# Patient Record
Sex: FEMALE | Race: BLACK OR AFRICAN AMERICAN | NOT HISPANIC OR LATINO | Employment: UNEMPLOYED | URBAN - METROPOLITAN AREA
[De-identification: names, ages, dates, MRNs, and addresses within clinical notes are randomized per-mention and may not be internally consistent; named-entity substitution may affect disease eponyms.]

---

## 2018-05-14 ENCOUNTER — OFFICE VISIT (OUTPATIENT)
Dept: PODIATRY | Facility: CLINIC | Age: 78
End: 2018-05-14
Payer: MEDICARE

## 2018-05-14 VITALS
HEIGHT: 64 IN | BODY MASS INDEX: 40.98 KG/M2 | WEIGHT: 240.06 LBS | DIASTOLIC BLOOD PRESSURE: 90 MMHG | SYSTOLIC BLOOD PRESSURE: 152 MMHG

## 2018-05-14 DIAGNOSIS — M20.42 HAMMER TOES OF BOTH FEET: ICD-10-CM

## 2018-05-14 DIAGNOSIS — N18.4 DIABETES MELLITUS DUE TO UNDERLYING CONDITION WITH STAGE 4 CHRONIC KIDNEY DISEASE, WITHOUT LONG-TERM CURRENT USE OF INSULIN: Primary | ICD-10-CM

## 2018-05-14 DIAGNOSIS — B35.1 NAIL DERMATOPHYTOSIS: ICD-10-CM

## 2018-05-14 DIAGNOSIS — M20.12 HALLUX ABDUCTO VALGUS, LEFT: ICD-10-CM

## 2018-05-14 DIAGNOSIS — M20.41 HAMMER TOES OF BOTH FEET: ICD-10-CM

## 2018-05-14 DIAGNOSIS — E08.22 DIABETES MELLITUS DUE TO UNDERLYING CONDITION WITH STAGE 4 CHRONIC KIDNEY DISEASE, WITHOUT LONG-TERM CURRENT USE OF INSULIN: Primary | ICD-10-CM

## 2018-05-14 DIAGNOSIS — M20.11 HALLUX ABDUCTO VALGUS, RIGHT: ICD-10-CM

## 2018-05-14 PROCEDURE — 3080F DIAST BP >= 90 MM HG: CPT | Mod: CPTII,S$GLB,, | Performed by: PODIATRIST

## 2018-05-14 PROCEDURE — 99203 OFFICE O/P NEW LOW 30 MIN: CPT | Mod: 25,S$GLB,, | Performed by: PODIATRIST

## 2018-05-14 PROCEDURE — 99999 PR PBB SHADOW E&M-NEW PATIENT-LVL III: CPT | Mod: PBBFAC,,, | Performed by: PODIATRIST

## 2018-05-14 PROCEDURE — 3077F SYST BP >= 140 MM HG: CPT | Mod: CPTII,S$GLB,, | Performed by: PODIATRIST

## 2018-05-14 PROCEDURE — 11721 DEBRIDE NAIL 6 OR MORE: CPT | Mod: Q9,S$GLB,, | Performed by: PODIATRIST

## 2018-05-14 RX ORDER — SERTRALINE HYDROCHLORIDE 50 MG/1
50 TABLET, FILM COATED ORAL DAILY
Refills: 0 | Status: ON HOLD | COMMUNITY
Start: 2018-05-09 | End: 2022-08-15

## 2018-05-14 RX ORDER — LINAGLIPTIN 5 MG/1
TABLET, FILM COATED ORAL
Refills: 4 | COMMUNITY
Start: 2018-04-03

## 2018-05-14 RX ORDER — FUROSEMIDE 40 MG/1
40 TABLET ORAL 2 TIMES DAILY
Refills: 3 | COMMUNITY
Start: 2018-04-22

## 2018-05-14 NOTE — LETTER
May 14, 2018      Marianne Padilla MD  [address] [place] [address]  [address]  [place] [address]           [facility]  [address] [place] [address]  Phone: [phone]          Patient: Nyasia Frye   MR Number: [id number]   YOB: 1940   Date of Visit: 5/14/2018       Dear Dr. Marianne Padilla:    Thank you for referring Nyasia Frye to me for evaluation. Attached you will find relevant portions of my assessment and plan of care.    If you have questions, please do not hesitate to call me. I look forward to following Nyasia Frye along with you.    Sincerely,    Amena Judge DPM    Enclosure  CC:  No Recipients    If you would like to receive this communication electronically, please contact [email or web address] or [phone] to request more information on [employer] Link access.    For providers and/or their staff who would like to refer a patient to [facility], please contact us through our one-stop-shop provider referral line, [facility], at [phone].    If you feel you have received this communication in error or would no longer like to receive these types of communications, please e-mail [email or web address]

## 2018-05-14 NOTE — PATIENT INSTRUCTIONS
Recommend lotions: eucerin, eucerin for diabetics, aquaphor, A&D ointment, gold bond for diabetics, sween, [place]'s Bees all purpose baby ointment,  urea 40 with aloe (found on amazon.com)    Shoe recommendations: (try 6pm.com, zappos.com , nordstromrack.eIQnetworks, or shoes.eIQnetworks for discounted prices) you can visit DSW shoes in [place]  or [employer] [facility] in the [facility] (there are also several shoe brand outlets in the [facility])    Asics (GT 2000 or gel foundations), new balance stability type shoes, saucony (stabil c3),  William (GTS or Beast or transcend), vionic, propet (tennis shoe)    sofft brand, clarks, crocs, aerosoles, naturalizers, SAS, ecco, born, andrew nation, rockports (dress shoes)    Vionic, burkenstocks, fitflops, propet (sandals)  Nike comfort thong sandals, crocs, propet (house shoes)    Nail Home remedy:  Vicks Vapor rub to nails for easier managability    Occasional soaks for 15-20 mins in luke warm water with 1 cup of listerine and 1 cup of apple cider vinegar are ok You may add several drops of oil of oregano or tea tree oil as well        Diabetes: Inspecting Your Feet  Diabetes increases your chances of developing foot problems. So inspect your feet every day. This helps you find small skin irritations before they become serious infections. If you have trouble seeing the bottoms of your feet, use a mirror or ask a family member or friend to help.     Pressure spots on the bottom of the foot are common areas where problems develop.   How to check your feet  Below are tips to help you look for foot problems. Try to check your feet at the same time each day, such as when you get out of bed in the morning:  · Check the top of each foot. The tops of toes, back of the heel, and outer edge of the foot can get a lot of rubbing from poor-fitting shoes.  · Check the bottom of each foot. Daily wear and tear often leads to problems at pressure spots.  · Check the toes and nails. Fungal infections often occur  between toes. Toenail problems can also be a sign of fungal infections or lead to breaks in the skin.  · Check your shoes, too. Loose objects inside a shoe can injure the foot. Use your hand to feel inside your shoes for things like shakeel, loose stitching, or rough areas that could irritate your skin.  Warning signs  Look for any color changes in the foot. Redness with streaks can signal a severe infection, which needs immediate medical attention. Tell your doctor right away if you have any of these problems:  · Swelling, sometimes with color changes, may be a sign of poor blood flow or infection. Symptoms include tenderness and an increase in the size of your foot.  · Warm or hot areas on your feet may be signs of infection. A foot that is cold may not be getting enough blood.  · Sensations such as burning, tingling, or pins and needles can be signs of a problem. Also check for areas that may be numb.  · Hot spots are caused by friction or pressure. Look for hot spots in areas that get a lot of rubbing. Hot spots can turn into blisters, calluses, or sores.  · Cracks and sores are caused by dry or irritated skin. They are a sign that the skin is breaking down, which can lead to infection.  · Toenail problems to watch for include nails growing into the skin (ingrown toenail) and causing redness or pain. Thick, yellow, or discolored nails can signal a fungal infection.  · Drainage and odor can develop from untreated sores and ulcers. Call your doctor right away if you notice white or yellow drainage, bleeding, or unpleasant odor.   © 1333-9680 [employer]. [address] [place] [address]. All rights reserved. This information is not intended as a substitute for professional medical care. Always follow your healthcare professional's instructions.        Step-by-Step:  Inspecting Your Feet (Diabetes)    Date Last Reviewed: 10/1/2016  © 6767-0102 [employer]. [address] [place] [address]  Road, [place] [address]. All rights reserved. This information is not intended as a substitute for professional medical care. Always follow your healthcare professional's instructions.

## 2018-05-14 NOTE — PROGRESS NOTES
Subjective:      Patient ID: Nyasia Frye is a 78 y.o. female.    Chief Complaint: Diabetes Mellitus (PCP Dr. Padilla 3/19/18); Diabetic Foot Exam; and Nail Care    Nyasia is a 78 y.o. female who presents to the clinic for evaluation and treatment of high risk feet. Nyasia has a past medical history of *Atrial fibrillation; Allergy; Atrial fibrillation; Controlled gout; Diabetes mellitus type II; Hypercholesteremia; Hyperlipidemia; Hypertension; and Osteoporosis, senile. The patient's chief complaint is long, thick toenails. This patient has documented high risk feet requiring routine maintenance secondary to diabetes mellitis and those secondary complications of diabetes, as mentioned..    PCP: Marianne Padilla MD    Date Last Seen by PCP:  05/27/2018 (upcoming)  Chief Complaint   Patient presents with    Diabetes Mellitus     PCP Dr. Padilla 3/19/18    Diabetic Foot Exam    Nail Care       Current shoe gear:  Slip-on shoes    No results found for: HGBA1C        Patient Active Problem List   Diagnosis    Senile osteoporosis    Diabetes mellitus, type 2    Hypertension    Atrial fibrillation    Hyperlipidemia    Rash    Cellulitis       Current Outpatient Prescriptions on File Prior to Visit   Medication Sig Dispense Refill    amiodarone (PACERONE) 200 MG Tab Take by mouth once daily.      febuxostat (ULORIC) 40 mg Tab Take 20 mg by mouth once daily.      fish oil-omega-3 fatty acids 300-1,000 mg capsule Take 2 g by mouth once daily.        losartan (COZAAR) 100 MG tablet Take 100 mg by mouth once daily.      pravastatin (PRAVACHOL) 40 MG tablet Take 40 mg by mouth once daily.        rivaroxaban (XARELTO) 15 mg Tab Take 10 mg by mouth once daily.      denosumab (PROLIA) 60 mg/mL Syrg Inject 1 mL (60 mg total) into the skin every 6 (six) months.      furosemide (LASIX) 20 MG tablet Take 20 mg by mouth 2 (two) times daily.        predniSONE (DELTASONE) 20 MG tablet Take 1 tablet (20 mg total) by mouth once  daily. 9 tablet 0    ranitidine (ZANTAC) 150 MG capsule Take 150 mg by mouth 2 (two) times daily.         Current Facility-Administered Medications on File Prior to Visit   Medication Dose Route Frequency Provider Last Rate Last Dose    denosumab (PROLIA) injection 60 mg  60 mg Subcutaneous Q6 Months Patricia Dueñas MD   60 mg at 03/18/16 1307       Review of patient's allergies indicates:   Allergen Reactions    Clindamycin      rash    Metformin     Penicillins     Zyloprim [allopurinol]        Past Surgical History:   Procedure Laterality Date    CARDIAC DEFIBRILLATOR PLACEMENT      OOPHORECTOMY      TUBAL LIGATION         Family History   Problem Relation Age of Onset    Breast cancer Sister     Colon cancer Sister     Ovarian cancer Neg Hx        Social History     Social History    Marital status:      Spouse name: N/A    Number of children: N/A    Years of education: N/A     Occupational History    Not on file.     Social History Main Topics    Smoking status: Former Smoker    Smokeless tobacco: Never Used    Alcohol use No    Drug use: No    Sexual activity: No     Other Topics Concern    Not on file     Social History Narrative    No narrative on file       Review of Systems   Constitution: Negative for chills, fever and weakness.   Cardiovascular: Positive for leg swelling. Negative for claudication.   Respiratory: Negative for cough and shortness of breath.    Skin: Positive for dry skin and nail changes. Negative for itching and rash.   Musculoskeletal: Positive for joint pain and myalgias. Negative for falls, joint swelling and muscle weakness.   Gastrointestinal: Negative for diarrhea, nausea and vomiting.   Neurological: Positive for numbness and paresthesias. Negative for tremors.   Psychiatric/Behavioral: Negative for altered mental status and hallucinations.           Objective:      Vitals:    05/14/18 1412   BP: (!) 152/90   Weight: 108.9 kg (240 lb 1.3  "oz)   Height: 5' 4" (1.626 m)   PainSc: 0-No pain       Physical Exam   Constitutional: She appears well-developed and well-nourished.  Non-toxic appearance. She does not have a sickly appearance. No distress.   alert and oriented x 3.    Cardiovascular:   Pulses:       Dorsalis pedis pulses are 1+ on the right side, and 1+ on the left side.        Posterior tibial pulses are 1+ on the right side, and 1+ on the left side.   Dorsalis pedis and posterior tibial pulses are diminished Bilaterally. Toes are cool to touch. Feet are warm proximally.There is decreased digital hair. Skin is atrophic, slightly hyperpigmented, and mildly edematous     Pulmonary/Chest: No respiratory distress.   Musculoskeletal: She exhibits no deformity.        Right ankle: She exhibits swelling. No tenderness. No lateral malleolus, no medial malleolus, no AITFL, no CF ligament and no posterior TFL tenderness found. Achilles tendon exhibits no pain, no defect and normal Collins's test results.        Left ankle: She exhibits swelling. No tenderness. No lateral malleolus, no medial malleolus, no AITFL, no CF ligament and no posterior TFL tenderness found. Achilles tendon exhibits no pain, no defect and normal Collins's test results.        Right foot: There is no tenderness and no bony tenderness.        Left foot: There is no tenderness and no bony tenderness.   Decreased stride, station of gait.  apropulsive toe off.  Increased angle and base of gait.    Patient has hammertoes of digits 2-5 bilateral partially reducible without symptom today.    Visible and palpable bunion without pain at dorsomedial 1st metatarsal head right and left.  Hallux abducted right and left partially reducible, tracks laterally without being track bound.  No ecchymosis, erythema, edema, or cardinal signs infection or signs of trauma same foot.    Muscle strength is 5/5 in all groups bilaterally.           Feet:   Right Foot:   Protective Sensation: 5 sites tested. " 5 sites sensed.   Left Foot:   Protective Sensation: 5 sites tested. 5 sites sensed.   Lymphadenopathy:   No lymphatic streaking     Neurological: She displays no atrophy. No sensory deficit.   Light touch present    Paresthesias, and hyperesthesia bilateral feet at toes with no clearly identified trigger or source.       Skin: Skin is warm, dry and intact. No abrasion, no bruising, no lesion and no rash noted. She is not diaphoretic. No cyanosis or erythema. No pallor. Nails show no clubbing.   Toenails 1-5 bilaterally are elongated by 2-3 mm, thickened by 2-3 mm, discolored/yellowed, dystrophic, brittle with subungual debris.    Psychiatric: Her mood appears not anxious. Her affect is not inappropriate. Her speech is not slurred. She is not combative. She is communicative. She is attentive.   Nursing note and vitals reviewed.          Assessment:       Encounter Diagnoses   Name Primary?    Diabetes mellitus due to underlying condition with stage 4 chronic kidney disease, without long-term current use of insulin Yes    Nail dermatophytosis     Hammer toes of both feet     Hallux abducto valgus, left     Hallux abducto valgus, right          Plan:       Nyasia was seen today for diabetes mellitus, diabetic foot exam and nail care.    Diagnoses and all orders for this visit:    Diabetes mellitus due to underlying condition with stage 4 chronic kidney disease, without long-term current use of insulin  -     DIABETIC SHOES FOR HOME USE    Nail dermatophytosis    Hammer toes of both feet  -     DIABETIC SHOES FOR HOME USE    Hallux abducto valgus, left  -     DIABETIC SHOES FOR HOME USE    Hallux abducto valgus, right  -     DIABETIC SHOES FOR HOME USE      I counseled the patient on her conditions, their implications and medical management.      Greater than 50% of this visit spent on counseling and coordination of care.    Greater than 20 minutes spent on education about the diabetic foot, neuropathy, and  prevention of limb loss.    Shoe inspection. Diabetic Foot Education. Patient reminded of the importance of good nutrition/healthy diet/weight management and blood sugar control to help prevent podiatric complications of diabetes. Patient instructed on proper foot hygeine. Wear comfortable, proper fitting shoes. Wash feet daily. Dry well. After drying, apply moisturizer to feet (no lotion to webspaces). Inspect feet daily for skin breaks, blisters, swelling, or redness. Wear cotton socks (preferably white)  Change socks every day. Do NOT walk barefoot. Do NOT use heating pads or hot water soaks. We discussed wearing proper shoe gear, daily foot inspections, never walking without protective shoe gear.     Discussed edema control and the continued use of compression stockings.     Discussed importance of daily moisturizer to the feet such as Gold bonds diabetic foot cream    Rx diabetic shoes for protection and support    With patient's permission, nails were aggressively reduced and debrided x 10 to their soft tissue attachment mechanically and with electric , removing all offending nail and debris. Patient relates relief following the procedure.     She will continue to monitor the areas daily, inspect her feet, wear protective shoe gear when ambulatory, moisturizer to maintain skin integrity and follow in this office in approximately 2-5 months, sooner p.r.n.

## 2018-05-15 NOTE — PROGRESS NOTES
Patient, Nyasia Frye (MRN #[id number]), presented with a recorded BMI of 41.21 kg/m^2 consistent with the definition of morbid obesity (ICD-10 E66.01). The patient's morbid obesity was monitored, evaluated, addressed and/or treated. This addendum to the medical record is made on 05/14/2018.

## 2018-08-21 ENCOUNTER — OFFICE VISIT (OUTPATIENT)
Dept: PODIATRY | Facility: CLINIC | Age: 78
End: 2018-08-21
Payer: MEDICARE

## 2018-08-21 VITALS
HEIGHT: 64 IN | BODY MASS INDEX: 40.97 KG/M2 | DIASTOLIC BLOOD PRESSURE: 78 MMHG | WEIGHT: 240 LBS | SYSTOLIC BLOOD PRESSURE: 120 MMHG

## 2018-08-21 DIAGNOSIS — M20.41 HAMMER TOES OF BOTH FEET: ICD-10-CM

## 2018-08-21 DIAGNOSIS — N18.4 DIABETES MELLITUS DUE TO UNDERLYING CONDITION WITH STAGE 4 CHRONIC KIDNEY DISEASE, WITHOUT LONG-TERM CURRENT USE OF INSULIN: Primary | ICD-10-CM

## 2018-08-21 DIAGNOSIS — M20.11 HALLUX ABDUCTO VALGUS, RIGHT: ICD-10-CM

## 2018-08-21 DIAGNOSIS — M20.42 HAMMER TOES OF BOTH FEET: ICD-10-CM

## 2018-08-21 DIAGNOSIS — L84 CORN OR CALLUS: ICD-10-CM

## 2018-08-21 DIAGNOSIS — E08.22 DIABETES MELLITUS DUE TO UNDERLYING CONDITION WITH STAGE 4 CHRONIC KIDNEY DISEASE, WITHOUT LONG-TERM CURRENT USE OF INSULIN: Primary | ICD-10-CM

## 2018-08-21 DIAGNOSIS — M20.12 HALLUX ABDUCTO VALGUS, LEFT: ICD-10-CM

## 2018-08-21 DIAGNOSIS — B35.1 NAIL DERMATOPHYTOSIS: ICD-10-CM

## 2018-08-21 PROCEDURE — 11721 DEBRIDE NAIL 6 OR MORE: CPT | Mod: 59,Q9,S$GLB, | Performed by: PODIATRIST

## 2018-08-21 PROCEDURE — 99499 UNLISTED E&M SERVICE: CPT | Mod: S$GLB,,, | Performed by: PODIATRIST

## 2018-08-21 PROCEDURE — 11056 PARNG/CUTG B9 HYPRKR LES 2-4: CPT | Mod: Q9,S$GLB,, | Performed by: PODIATRIST

## 2018-08-21 PROCEDURE — 99999 PR PBB SHADOW E&M-EST. PATIENT-LVL III: CPT | Mod: PBBFAC,,, | Performed by: PODIATRIST

## 2018-08-21 NOTE — PATIENT INSTRUCTIONS
Recommend lotions: eucerin, eucerin for diabetics, aquaphor, A&D ointment, gold bond for diabetics, sween, [place]'s Bees all purpose baby ointment,  urea 40 with aloe (found on amazon.com)    Shoe recommendations: (try 6pm.com, zappos.com , nordstromrack.Gridco, or shoes.[employer]co for discounted prices) you can visit DSW shoes in [place]  or [facility] in the [facility] (there are also several shoe brand outlets in the [facility])    Asics (GT 2000 or gel foundations), new balance stability type shoes, saucony (stabil c3),  William (GTS or Beast or transcend), vionic, propet (tennis shoe)    sofft brand, clarks, crocs, aerosoles, naturalizers, SAS, ecco, born, andrew nation, rockports (dress shoes)    Vionic, burkenstocks, fitflops, propet (sandals)  Nike comfort thong sandals, crocs, propet (house shoes)    Nail Home remedy:  Vicks Vapor rub to nails for easier managability    Occasional soaks for 15-20 mins in luke warm water with 1 cup of listerine and 1 cup of apple cider vinegar are ok You may add several drops of oil of oregano or tea tree oil as well        Diabetes: Inspecting Your Feet  Diabetes increases your chances of developing foot problems. So inspect your feet every day. This helps you find small skin irritations before they become serious infections. If you have trouble seeing the bottoms of your feet, use a mirror or ask a family member or friend to help.     Pressure spots on the bottom of the foot are common areas where problems develop.   How to check your feet  Below are tips to help you look for foot problems. Try to check your feet at the same time each day, such as when you get out of bed in the morning:  · Check the top of each foot. The tops of toes, back of the heel, and outer edge of the foot can get a lot of rubbing from poor-fitting shoes.  · Check the bottom of each foot. Daily wear and tear often leads to problems at pressure spots.  · Check the toes and nails. Fungal infections often occur  between toes. Toenail problems can also be a sign of fungal infections or lead to breaks in the skin.  · Check your shoes, too. Loose objects inside a shoe can injure the foot. Use your hand to feel inside your shoes for things like shakeel, loose stitching, or rough areas that could irritate your skin.  Warning signs  Look for any color changes in the foot. Redness with streaks can signal a severe infection, which needs immediate medical attention. Tell your doctor right away if you have any of these problems:  · Swelling, sometimes with color changes, may be a sign of poor blood flow or infection. Symptoms include tenderness and an increase in the size of your foot.  · Warm or hot areas on your feet may be signs of infection. A foot that is cold may not be getting enough blood.  · Sensations such as burning, tingling, or pins and needles can be signs of a problem. Also check for areas that may be numb.  · Hot spots are caused by friction or pressure. Look for hot spots in areas that get a lot of rubbing. Hot spots can turn into blisters, calluses, or sores.  · Cracks and sores are caused by dry or irritated skin. They are a sign that the skin is breaking down, which can lead to infection.  · Toenail problems to watch for include nails growing into the skin (ingrown toenail) and causing redness or pain. Thick, yellow, or discolored nails can signal a fungal infection.  · Drainage and odor can develop from untreated sores and ulcers. Call your doctor right away if you notice white or yellow drainage, bleeding, or unpleasant odor.   © 3149-5287 [employer]. [address] [place] [address]. All rights reserved. This information is not intended as a substitute for professional medical care. Always follow your healthcare professional's instructions.        Step-by-Step:  Inspecting Your Feet (Diabetes)    Date Last Reviewed: 10/1/2016  © 4542-7357 [employer]. [address] [place] [address]  Road, [place] [address]. All rights reserved. This information is not intended as a substitute for professional medical care. Always follow your healthcare professional's instructions.

## 2018-08-21 NOTE — PROGRESS NOTES
Subjective:      Patient ID: Nyasia Frye is a 78 y.o. female.    Chief Complaint: Diabetes Mellitus (pcp DENISE Padilla); Diabetic Foot Exam; and Nail Care    Nyasia is a 78 y.o. female who presents to the clinic for evaluation and treatment of high risk feet. Nyasia has a past medical history of *Atrial fibrillation, Allergy, Atrial fibrillation, Controlled gout, Diabetes mellitus type II, Hypercholesteremia, Hyperlipidemia, Hypertension, and Osteoporosis, senile. The patient's chief complaint is long, thick toenails. This patient has documented high risk feet requiring routine maintenance secondary to diabetes mellitis and those secondary complications of diabetes, as mentioned..    PCP: Marianne Padilla MD    Date Last Seen by PCP:  08/27/2018 (upcoming)  Chief Complaint   Patient presents with    Diabetes Mellitus     pcp DENISE Padilla    Diabetic Foot Exam    Nail Care       Current shoe gear:  Slip-on shoes    No results found for: HGBA1C        Patient Active Problem List   Diagnosis    Senile osteoporosis    Diabetes mellitus, type 2    Hypertension    Atrial fibrillation    Hyperlipidemia    Rash    Cellulitis       Current Outpatient Medications on File Prior to Visit   Medication Sig Dispense Refill    amiodarone (PACERONE) 200 MG Tab Take by mouth once daily.      febuxostat (ULORIC) 40 mg Tab Take 20 mg by mouth once daily.      fish oil-omega-3 fatty acids 300-1,000 mg capsule Take 2 g by mouth once daily.        furosemide (LASIX) 20 MG tablet Take 20 mg by mouth 2 (two) times daily.        furosemide (LASIX) 40 MG tablet Take 40 mg by mouth 2 (two) times daily.  3    losartan (COZAAR) 100 MG tablet Take 100 mg by mouth once daily.      pravastatin (PRAVACHOL) 40 MG tablet Take 40 mg by mouth once daily.        ranitidine (ZANTAC) 150 MG capsule Take 150 mg by mouth 2 (two) times daily.        ranitidine (ZANTAC) 150 MG tablet Take 150 mg by mouth 2 (two) times daily.  0    rivaroxaban (XARELTO)  15 mg Tab Take 10 mg by mouth once daily.      sertraline (ZOLOFT) 50 MG tablet Take 50 mg by mouth once daily.  0    TRADJENTA 5 mg Tab tablet TAKE ONE(1) TABLET BY MOUTH EVERY DAY  4    denosumab (PROLIA) 60 mg/mL Syrg Inject 1 mL (60 mg total) into the skin every 6 (six) months.      predniSONE (DELTASONE) 20 MG tablet Take 1 tablet (20 mg total) by mouth once daily. 9 tablet 0     Current Facility-Administered Medications on File Prior to Visit   Medication Dose Route Frequency Provider Last Rate Last Dose    denosumab (PROLIA) injection 60 mg  60 mg Subcutaneous Q6 Months Patricia Dueñas MD   60 mg at 03/18/16 1307       Review of patient's allergies indicates:   Allergen Reactions    Clindamycin      rash    Metformin     Penicillins     Zyloprim [allopurinol]        Past Surgical History:   Procedure Laterality Date    CARDIAC DEFIBRILLATOR PLACEMENT      OOPHORECTOMY      TUBAL LIGATION         Family History   Problem Relation Age of Onset    Breast cancer Sister     Colon cancer Sister     Ovarian cancer Neg Hx        Social History     Socioeconomic History    Marital status:      Spouse name: Not on file    Number of children: Not on file    Years of education: Not on file    Highest education level: Not on file   Social Needs    Financial resource strain: Not on file    Food insecurity - worry: Not on file    Food insecurity - inability: Not on file    Transportation needs - medical: Not on file    Transportation needs - non-medical: Not on file   Occupational History    Not on file   Tobacco Use    Smoking status: Former Smoker    Smokeless tobacco: Never Used   Substance and Sexual Activity    Alcohol use: No    Drug use: No    Sexual activity: No   Other Topics Concern    Not on file   Social History Narrative    Not on file       Review of Systems   Constitution: Negative for chills, fever and weakness.   Cardiovascular: Positive for leg swelling.  "Negative for claudication.   Respiratory: Negative for cough and shortness of breath.    Skin: Positive for dry skin and nail changes. Negative for itching and rash.   Musculoskeletal: Positive for joint pain and myalgias. Negative for falls, joint swelling and muscle weakness.   Gastrointestinal: Negative for diarrhea, nausea and vomiting.   Neurological: Positive for numbness and paresthesias. Negative for tremors.   Psychiatric/Behavioral: Negative for altered mental status and hallucinations.           Objective:      Vitals:    08/21/18 1442   BP: 120/78   Weight: 108.9 kg (240 lb)   Height: 5' 4" (1.626 m)   PainSc: 0-No pain       Physical Exam   Constitutional: She appears well-developed and well-nourished.  Non-toxic appearance. She does not have a sickly appearance. No distress.   alert and oriented x 3.    Cardiovascular:   Pulses:       Dorsalis pedis pulses are 1+ on the right side, and 1+ on the left side.        Posterior tibial pulses are 1+ on the right side, and 1+ on the left side.   Dorsalis pedis and posterior tibial pulses are diminished Bilaterally. Toes are cool to touch. Feet are warm proximally.There is decreased digital hair. Skin is atrophic, slightly hyperpigmented, and mildly edematous     Pulmonary/Chest: No respiratory distress.   Musculoskeletal: She exhibits no deformity.        Right ankle: She exhibits swelling. No tenderness. No lateral malleolus, no medial malleolus, no AITFL, no CF ligament and no posterior TFL tenderness found. Achilles tendon exhibits no pain, no defect and normal Collins's test results.        Left ankle: She exhibits swelling. No tenderness. No lateral malleolus, no medial malleolus, no AITFL, no CF ligament and no posterior TFL tenderness found. Achilles tendon exhibits no pain, no defect and normal Collins's test results.        Right foot: There is no tenderness and no bony tenderness.        Left foot: There is no tenderness and no bony tenderness. "   Decreased stride, station of gait.  apropulsive toe off.  Increased angle and base of gait.    Patient has hammertoes of digits 2-5 bilateral partially reducible without symptom today.    Visible and palpable bunion without pain at dorsomedial 1st metatarsal head right and left.  Hallux abducted right and left partially reducible, tracks laterally without being track bound.  No ecchymosis, erythema, edema, or cardinal signs infection or signs of trauma same foot.    Muscle strength is 5/5 in all groups bilaterally.           Feet:   Right Foot:   Protective Sensation: 5 sites tested. 5 sites sensed.   Left Foot:   Protective Sensation: 5 sites tested. 5 sites sensed.   Lymphadenopathy:   No lymphatic streaking     Neurological: She displays no atrophy. No sensory deficit.   Light touch present    Paresthesias, and hyperesthesia bilateral feet at toes with no clearly identified trigger or source.       Skin: Skin is warm, dry and intact. No abrasion, no bruising and no rash noted. She is not diaphoretic. No cyanosis or erythema. No pallor. Nails show no clubbing.   Toenails 1-5 bilaterally are elongated by 2-3 mm, thickened by 2-3 mm, discolored/yellowed, dystrophic, brittle with subungual debris.     Focal hyperkeratotic lesion consisting entirely of hyperkeratotic tissue without open skin, drainage, pus, fluctuance, malodor, or signs of infection medial hallux IPJ keegan and plantar heel midline keegan   Psychiatric: Her mood appears not anxious. Her affect is not inappropriate. Her speech is not slurred. She is not combative. She is communicative. She is attentive.   Nursing note and vitals reviewed.          Assessment:       Encounter Diagnoses   Name Primary?    Diabetes mellitus due to underlying condition with stage 4 chronic kidney disease, without long-term current use of insulin Yes    Nail dermatophytosis     Hammer toes of both feet     Hallux abducto valgus, left     Hallux abducto valgus, right      Corn or callus          Plan:       Nyasia was seen today for diabetes mellitus, diabetic foot exam and nail care.    Diagnoses and all orders for this visit:    Diabetes mellitus due to underlying condition with stage 4 chronic kidney disease, without long-term current use of insulin    Nail dermatophytosis    Hammer toes of both feet    Hallux abducto valgus, left    Hallux abducto valgus, right    Corn or callus      I counseled the patient on her conditions, their implications and medical management.      Greater than 50% of this visit spent on counseling and coordination of care.    Education about the diabetic foot, neuropathy, and prevention of limb loss.    Shoe inspection. Diabetic Foot Education. Patient reminded of the importance of good nutrition/healthy diet/weight management and blood sugar control to help prevent podiatric complications of diabetes. Patient instructed on proper foot hygeine. Wear comfortable, proper fitting shoes. Wash feet daily. Dry well. After drying, apply moisturizer to feet (no lotion to webspaces). Inspect feet daily for skin breaks, blisters, swelling, or redness. Wear cotton socks (preferably white)  Change socks every day. Do NOT walk barefoot. Do NOT use heating pads or hot water soaks. We discussed wearing proper shoe gear, daily foot inspections, never walking without protective shoe gear.     Discussed edema control and the continued use of compression stockings.     Discussed importance of daily moisturizer to the feet such as Gold bonds diabetic foot cream    After cleansing the  area w/ alcohol prep pad the above mentioned hyperkeratosis was trimmed utilizing No 15 scapel, to a smooth base with out incident. Patient tolerated this  well and reported comfort to the area of medial hallux IPJ keegan and plantar heel midline keegan    With patient's permission, nails were aggressively reduced and debrided x 10 to their soft tissue attachment mechanically and with electric  , removing all offending nail and debris. Patient relates relief following the procedure.     She will continue to monitor the areas daily, inspect her feet, wear protective shoe gear when ambulatory, moisturizer to maintain skin integrity and follow in this office in approximately 2-5 months, sooner p.r.n.

## 2018-11-27 ENCOUNTER — OFFICE VISIT (OUTPATIENT)
Dept: PODIATRY | Facility: CLINIC | Age: 78
End: 2018-11-27
Payer: MEDICARE

## 2018-11-27 VITALS
BODY MASS INDEX: 40.97 KG/M2 | WEIGHT: 240 LBS | DIASTOLIC BLOOD PRESSURE: 84 MMHG | SYSTOLIC BLOOD PRESSURE: 130 MMHG | HEIGHT: 64 IN

## 2018-11-27 DIAGNOSIS — E08.22 DIABETES MELLITUS DUE TO UNDERLYING CONDITION WITH STAGE 4 CHRONIC KIDNEY DISEASE, WITHOUT LONG-TERM CURRENT USE OF INSULIN: Primary | ICD-10-CM

## 2018-11-27 DIAGNOSIS — M20.11 HALLUX ABDUCTO VALGUS, RIGHT: ICD-10-CM

## 2018-11-27 DIAGNOSIS — M20.12 HALLUX ABDUCTO VALGUS, LEFT: ICD-10-CM

## 2018-11-27 DIAGNOSIS — M20.41 HAMMER TOES OF BOTH FEET: ICD-10-CM

## 2018-11-27 DIAGNOSIS — N18.4 DIABETES MELLITUS DUE TO UNDERLYING CONDITION WITH STAGE 4 CHRONIC KIDNEY DISEASE, WITHOUT LONG-TERM CURRENT USE OF INSULIN: Primary | ICD-10-CM

## 2018-11-27 DIAGNOSIS — L84 CORN OR CALLUS: ICD-10-CM

## 2018-11-27 DIAGNOSIS — M20.42 HAMMER TOES OF BOTH FEET: ICD-10-CM

## 2018-11-27 DIAGNOSIS — B35.1 NAIL DERMATOPHYTOSIS: ICD-10-CM

## 2018-11-27 PROCEDURE — 11056 PARNG/CUTG B9 HYPRKR LES 2-4: CPT | Mod: Q9,S$GLB,, | Performed by: PODIATRIST

## 2018-11-27 PROCEDURE — 99999 PR PBB SHADOW E&M-EST. PATIENT-LVL III: CPT | Mod: PBBFAC,,, | Performed by: PODIATRIST

## 2018-11-27 PROCEDURE — 11721 DEBRIDE NAIL 6 OR MORE: CPT | Mod: 59,Q9,S$GLB, | Performed by: PODIATRIST

## 2018-11-27 PROCEDURE — 99499 UNLISTED E&M SERVICE: CPT | Mod: S$GLB,,, | Performed by: PODIATRIST

## 2018-11-27 NOTE — PATIENT INSTRUCTIONS
Recommend lotions: eucerin, eucerin for diabetics, aquaphor, A&D ointment, gold bond for diabetics, sween, Kingston's Bees all purpose baby ointment,  urea 40 with aloe (found on amazon.com)    Shoe recommendations: (try [email or web address], [email or web address] , [email or web address], or [email or web address] for discounted prices) you can visit DSW shoes in [place]  or [employer] in the [facility] (there are also several shoe brand outlets in the [facility])    Asics (GT 2000 or gel foundations), new balance stability type shoes, saucony (stabil c3),  William (GTS or Beast or transcend), propet (tennis shoe)    Sofdebi Fernandez (women) Danya&Hardik (men), clarks, crocs, aerosoles, naturalizers, SAS, ecco, born, andrew nation, rockports (dress shoes)    Vionic, burkenstocks, fitflops, propet (sandals)  Nike comfort thong sandals, crocs, propet (house shoes)    Nail Home remedy:  Vicks Vapor rub to nails for easier manageability    Occasional soaks for 15-20 mins in luke warm water with 1/2 cup of listerine and 1 cup of apple cider vinegar are ok You may add several drops of oil of oregano or tea tree oil as well      Diabetes: Inspecting Your Feet  Diabetes increases your chances of developing foot problems. So inspect your feet every day. This helps you find small skin irritations before they become serious infections. If you have trouble seeing the bottoms of your feet, use a mirror or ask a family member or friend to help.     Pressure spots on the bottom of the foot are common areas where problems develop.   How to check your feet  Below are tips to help you look for foot problems. Try to check your feet at the same time each day, such as when you get out of bed in the morning:  · Check the top of each foot. The tops of toes, back of the heel, and outer edge of the foot can get a lot of rubbing from poor-fitting shoes.  · Check the bottom of each foot. Daily wear and tear often leads to problems at pressure spots.  · Check the toes and nails. Fungal  infections often occur between toes. Toenail problems can also be a sign of fungal infections or lead to breaks in the skin.  · Check your shoes, too. Loose objects inside a shoe can injure the foot. Use your hand to feel inside your shoes for things like shakeel, loose stitching, or rough areas that could irritate your skin.  Warning signs  Look for any color changes in the foot. Redness with streaks can signal a severe infection, which needs immediate medical attention. Tell your doctor right away if you have any of these problems:  · Swelling, sometimes with color changes, may be a sign of poor blood flow or infection. Symptoms include tenderness and an increase in the size of your foot.  · Warm or hot areas on your feet may be signs of infection. A foot that is cold may not be getting enough blood.  · Sensations such as burning, tingling, or pins and needles can be signs of a problem. Also check for areas that may be numb.  · Hot spots are caused by friction or pressure. Look for hot spots in areas that get a lot of rubbing. Hot spots can turn into blisters, calluses, or sores.  · Cracks and sores are caused by dry or irritated skin. They are a sign that the skin is breaking down, which can lead to infection.  · Toenail problems to watch for include nails growing into the skin (ingrown toenail) and causing redness or pain. Thick, yellow, or discolored nails can signal a fungal infection.  · Drainage and odor can develop from untreated sores and ulcers. Call your doctor right away if you notice white or yellow drainage, bleeding, or unpleasant odor.   © 4084-0068 [employer], [employer]. [address] [place] [address]. All rights reserved. This information is not intended as a substitute for professional medical care. Always follow your healthcare professional's instructions.        Step-by-Step:  Inspecting Your Feet (Diabetes)    Date Last Reviewed: 10/1/2016  © 5498-2559 [employer] [address] [place] [address]. All rights reserved. This information is not intended as a substitute for professional medical care. Always follow your healthcare professional's instructions.

## 2018-11-27 NOTE — PROGRESS NOTES
Subjective:      Patient ID: Nyasia Frye is a 78 y.o. female.    Chief Complaint: Diabetes Mellitus (Pc Dr.S. Padilla  11/2018); Diabetic Foot Exam; and Nail Care    Nyasia is a 78 y.o. female who presents to the clinic for evaluation and treatment of high risk feet. Nyasia has a past medical history of *Atrial fibrillation, Allergy, Atrial fibrillation, Controlled gout, Diabetes mellitus type II, Hypercholesteremia, Hyperlipidemia, Hypertension, and Osteoporosis, senile. The patient's chief complaint is abnormal sensations to the feet as well as long, thick toenails. This patient has documented high risk feet requiring routine maintenance secondary to diabetes mellitis and those secondary complications of diabetes, as mentioned..    PCP: Marianne Padilla MD    Date Last Seen by PCP:    Chief Complaint   Patient presents with    Diabetes Mellitus     Pc Dr.S. Padilla  11/2018    Diabetic Foot Exam    Nail Care       Current shoe gear:  Slip-on shoes    No results found for: HGBA1C        Patient Active Problem List   Diagnosis    Senile osteoporosis    Diabetes mellitus, type 2    Hypertension    Atrial fibrillation    Hyperlipidemia    Rash    Cellulitis       Current Outpatient Medications on File Prior to Visit   Medication Sig Dispense Refill    amiodarone (PACERONE) 200 MG Tab Take by mouth once daily.      febuxostat (ULORIC) 40 mg Tab Take 20 mg by mouth once daily.      fish oil-omega-3 fatty acids 300-1,000 mg capsule Take 2 g by mouth once daily.        furosemide (LASIX) 20 MG tablet Take 20 mg by mouth 2 (two) times daily.        furosemide (LASIX) 40 MG tablet Take 40 mg by mouth 2 (two) times daily.  3    losartan (COZAAR) 100 MG tablet Take 100 mg by mouth once daily.      pravastatin (PRAVACHOL) 40 MG tablet Take 40 mg by mouth once daily.        ranitidine (ZANTAC) 150 MG capsule Take 150 mg by mouth 2 (two) times daily.        ranitidine (ZANTAC) 150 MG tablet Take 150 mg by mouth 2  (two) times daily.  0    rivaroxaban (XARELTO) 15 mg Tab Take 10 mg by mouth once daily.      sertraline (ZOLOFT) 50 MG tablet Take 50 mg by mouth once daily.  0    TRADJENTA 5 mg Tab tablet TAKE ONE(1) TABLET BY MOUTH EVERY DAY  4    denosumab (PROLIA) 60 mg/mL Syrg Inject 1 mL (60 mg total) into the skin every 6 (six) months.      predniSONE (DELTASONE) 20 MG tablet Take 1 tablet (20 mg total) by mouth once daily. 9 tablet 0     No current facility-administered medications on file prior to visit.        Review of patient's allergies indicates:   Allergen Reactions    Clindamycin      rash    Metformin     Penicillins     Zyloprim [allopurinol]        Past Surgical History:   Procedure Laterality Date    CARDIAC DEFIBRILLATOR PLACEMENT      OOPHORECTOMY      TUBAL LIGATION         Family History   Problem Relation Age of Onset    Breast cancer Sister     Colon cancer Sister     Ovarian cancer Neg Hx        Social History     Socioeconomic History    Marital status:      Spouse name: Not on file    Number of children: Not on file    Years of education: Not on file    Highest education level: Not on file   Social Needs    Financial resource strain: Not on file    Food insecurity - worry: Not on file    Food insecurity - inability: Not on file    Transportation needs - medical: Not on file    Transportation needs - non-medical: Not on file   Occupational History    Not on file   Tobacco Use    Smoking status: Former Smoker    Smokeless tobacco: Never Used   Substance and Sexual Activity    Alcohol use: No    Drug use: No    Sexual activity: No   Other Topics Concern    Not on file   Social History Narrative    Not on file       Review of Systems   Constitution: Negative for chills, fever and weakness.   Cardiovascular: Positive for leg swelling. Negative for claudication.   Respiratory: Negative for cough and shortness of breath.    Skin: Positive for dry skin and nail changes.  "Negative for itching and rash.   Musculoskeletal: Positive for joint pain and myalgias. Negative for falls, joint swelling and muscle weakness.   Gastrointestinal: Negative for diarrhea, nausea and vomiting.   Neurological: Positive for numbness and paresthesias. Negative for tremors.   Psychiatric/Behavioral: Negative for altered mental status and hallucinations.           Objective:      Vitals:    11/27/18 1435   BP: 130/84   Weight: 108.9 kg (240 lb)   Height: 5' 4" (1.626 m)   PainSc: 0-No pain       Physical Exam   Constitutional: She appears well-developed and well-nourished.  Non-toxic appearance. She does not have a sickly appearance. No distress.   alert and oriented x 3.    Cardiovascular:   Pulses:       Dorsalis pedis pulses are 1+ on the right side, and 1+ on the left side.        Posterior tibial pulses are 1+ on the right side, and 1+ on the left side.   Dorsalis pedis and posterior tibial pulses are diminished Bilaterally. Toes are cool to touch. Feet are warm proximally.There is decreased digital hair. Skin is atrophic, slightly hyperpigmented, and mildly edematous     Pulmonary/Chest: No respiratory distress.   Musculoskeletal: She exhibits no deformity.        Right ankle: She exhibits swelling. No tenderness. No lateral malleolus, no medial malleolus, no AITFL, no CF ligament and no posterior TFL tenderness found. Achilles tendon exhibits no pain, no defect and normal Collins's test results.        Left ankle: She exhibits swelling. No tenderness. No lateral malleolus, no medial malleolus, no AITFL, no CF ligament and no posterior TFL tenderness found. Achilles tendon exhibits no pain, no defect and normal Collins's test results.        Right foot: There is no tenderness and no bony tenderness.        Left foot: There is no tenderness and no bony tenderness.   Decreased stride, station of gait.  apropulsive toe off.  Increased angle and base of gait.    Patient has hammertoes of digits 2-5 " bilateral partially reducible without symptom today.    Visible and palpable bunion without pain at dorsomedial 1st metatarsal head right and left.  Hallux abducted right and left partially reducible, tracks laterally without being track bound.  No ecchymosis, erythema, edema, or cardinal signs infection or signs of trauma same foot.    Muscle strength is 5/5 in all groups bilaterally.           Feet:   Right Foot:   Protective Sensation: 5 sites tested. 5 sites sensed.   Left Foot:   Protective Sensation: 5 sites tested. 5 sites sensed.   Lymphadenopathy:   No lymphatic streaking     Neurological: She displays no atrophy. No sensory deficit.   Light touch present    Paresthesias, and hyperesthesia bilateral feet at toes with no clearly identified trigger or source.       Skin: Skin is warm, dry and intact. No abrasion, no bruising and no rash noted. She is not diaphoretic. No cyanosis or erythema. No pallor. Nails show no clubbing.   Toenails 1-5 bilaterally are elongated by 2-3 mm, thickened by 2-3 mm, discolored/yellowed, dystrophic, brittle with subungual debris.     Focal hyperkeratotic lesion consisting entirely of hyperkeratotic tissue without open skin, drainage, pus, fluctuance, malodor, or signs of infection medial hallux IPJ keegan and plantar heel midline keegan   Psychiatric: Her mood appears not anxious. Her affect is not inappropriate. Her speech is not slurred. She is not combative. She is communicative. She is attentive.   Nursing note and vitals reviewed.          Assessment:       Encounter Diagnoses   Name Primary?   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      Greater than 50% of this visit spent on counseling and coordination of care.    Education about the diabetic foot, neuropathy, and prevention of limb loss.    Shoe inspection. Diabetic Foot Education. Patient reminded of the importance of good nutrition/healthy diet/weight management and blood sugar control to help prevent podiatric complications of diabetes. Patient instructed on proper foot hygeine. Wear comfortable, proper fitting shoes. Wash feet daily. Dry well. After drying, apply moisturizer to feet (no lotion to webspaces). Inspect feet daily for skin breaks, blisters, swelling, or redness. Wear cotton socks (preferably white)  Change socks every day. Do NOT walk barefoot. Do NOT use heating pads or hot water soaks. We discussed wearing proper shoe gear, daily foot inspections, never walking without protective shoe gear.     Discussed edema control and the continued use of compression stockings.     Discussed importance of daily moisturizer to the feet such as Gold bonds diabetic foot cream    After cleansing the  area w/ alcohol prep pad the above mentioned hyperkeratosis was trimmed utilizing No 15 scapel, to a smooth base with out incident. Patient tolerated this  well and reported comfort to the area of medial hallux IPJ keegan and plantar heel midline keegan    With patient's permission, nails were aggressively reduced and debrided x 10 to their soft tissue attachment mechanically and with electric , removing all offending nail and debris. Patient relates relief following the procedure.     She will continue to monitor the areas  daily, inspect her feet, wear protective shoe gear when ambulatory, moisturizer to maintain skin integrity and follow in this office in approximately 2-5 months, sooner p.r.n.

## 2019-05-13 ENCOUNTER — OFFICE VISIT (OUTPATIENT)
Dept: PODIATRY | Facility: CLINIC | Age: 79
End: 2019-05-13
Payer: MEDICARE

## 2019-05-13 VITALS
SYSTOLIC BLOOD PRESSURE: 124 MMHG | BODY MASS INDEX: 40.97 KG/M2 | HEIGHT: 64 IN | WEIGHT: 240 LBS | DIASTOLIC BLOOD PRESSURE: 82 MMHG

## 2019-05-13 DIAGNOSIS — M20.12 HALLUX ABDUCTO VALGUS, LEFT: ICD-10-CM

## 2019-05-13 DIAGNOSIS — B35.1 NAIL DERMATOPHYTOSIS: ICD-10-CM

## 2019-05-13 DIAGNOSIS — M20.42 HAMMER TOES OF BOTH FEET: ICD-10-CM

## 2019-05-13 DIAGNOSIS — N18.4 DIABETES MELLITUS DUE TO UNDERLYING CONDITION WITH STAGE 4 CHRONIC KIDNEY DISEASE, WITHOUT LONG-TERM CURRENT USE OF INSULIN: Primary | ICD-10-CM

## 2019-05-13 DIAGNOSIS — E08.22 DIABETES MELLITUS DUE TO UNDERLYING CONDITION WITH STAGE 4 CHRONIC KIDNEY DISEASE, WITHOUT LONG-TERM CURRENT USE OF INSULIN: Primary | ICD-10-CM

## 2019-05-13 DIAGNOSIS — M20.41 HAMMER TOES OF BOTH FEET: ICD-10-CM

## 2019-05-13 DIAGNOSIS — M20.11 HALLUX ABDUCTO VALGUS, RIGHT: ICD-10-CM

## 2019-05-13 DIAGNOSIS — L84 CORN OR CALLUS: ICD-10-CM

## 2019-05-13 PROCEDURE — 99499 UNLISTED E&M SERVICE: CPT | Mod: S$GLB,,, | Performed by: PODIATRIST

## 2019-05-13 PROCEDURE — 99499 NO LOS: ICD-10-PCS | Mod: S$GLB,,, | Performed by: PODIATRIST

## 2019-05-13 PROCEDURE — 99999 PR PBB SHADOW E&M-EST. PATIENT-LVL III: CPT | Mod: PBBFAC,,, | Performed by: PODIATRIST

## 2019-05-13 PROCEDURE — 99999 PR PBB SHADOW E&M-EST. PATIENT-LVL III: ICD-10-PCS | Mod: PBBFAC,,, | Performed by: PODIATRIST

## 2019-05-13 PROCEDURE — 11056 PARNG/CUTG B9 HYPRKR LES 2-4: CPT | Mod: Q9,S$GLB,, | Performed by: PODIATRIST

## 2019-05-13 PROCEDURE — 11721 DEBRIDE NAIL 6 OR MORE: CPT | Mod: 59,Q9,S$GLB, | Performed by: PODIATRIST

## 2019-05-13 PROCEDURE — 11721 PR DEBRIDEMENT OF NAILS, 6 OR MORE: ICD-10-PCS | Mod: 59,Q9,S$GLB, | Performed by: PODIATRIST

## 2019-05-13 PROCEDURE — 11056 PR TRIM BENIGN HYPERKERATOTIC SKIN LESION,2-4: ICD-10-PCS | Mod: Q9,S$GLB,, | Performed by: PODIATRIST

## 2019-05-13 NOTE — PROGRESS NOTES
Subjective:      Patient ID: Nyasia Frye is a 79 y.o. female.    Chief Complaint: Diabetes Mellitus (Pcp Dr. Padilla 04/4/2019); Diabetic Foot Exam; and Nail Care    Nyasia is a 79 y.o. female who presents to the clinic for evaluation and treatment of high risk feet. Nyasia has a past medical history of *Atrial fibrillation, Allergy, Atrial fibrillation, Controlled gout, Diabetes mellitus type II, Hypercholesteremia, Hyperlipidemia, Hypertension, and Osteoporosis, senile. The patient's chief complaint is abnormal sensations to the feet as well as long, thick toenails. This patient has documented high risk feet requiring routine maintenance secondary to diabetes mellitis and those secondary complications of diabetes, as mentioned..    PCP: Marianne Padilla MD    Date Last Seen by PCP:    Chief Complaint   Patient presents with    Diabetes Mellitus     Pcp Dr. Padilla 04/4/2019    Diabetic Foot Exam    Nail Care       Current shoe gear:  Casual shoes    No results found for: HGBA1C        Patient Active Problem List   Diagnosis    Senile osteoporosis    Diabetes mellitus, type 2    Hypertension    Atrial fibrillation    Hyperlipidemia    Rash    Cellulitis       Current Outpatient Medications on File Prior to Visit   Medication Sig Dispense Refill    amiodarone (PACERONE) 200 MG Tab Take by mouth once daily.      febuxostat (ULORIC) 40 mg Tab Take 20 mg by mouth once daily.      fish oil-omega-3 fatty acids 300-1,000 mg capsule Take 2 g by mouth once daily.        furosemide (LASIX) 20 MG tablet Take 20 mg by mouth 2 (two) times daily.        furosemide (LASIX) 40 MG tablet Take 40 mg by mouth 2 (two) times daily.  3    losartan (COZAAR) 100 MG tablet Take 100 mg by mouth once daily.      pravastatin (PRAVACHOL) 40 MG tablet Take 40 mg by mouth once daily.        ranitidine (ZANTAC) 150 MG capsule Take 150 mg by mouth 2 (two) times daily.        ranitidine (ZANTAC) 150 MG tablet Take 150 mg by mouth 2 (two)  times daily.  0    rivaroxaban (XARELTO) 15 mg Tab Take 10 mg by mouth once daily.      sertraline (ZOLOFT) 50 MG tablet Take 50 mg by mouth once daily.  0    TRADJENTA 5 mg Tab tablet TAKE ONE(1) TABLET BY MOUTH EVERY DAY  4    denosumab (PROLIA) 60 mg/mL Syrg Inject 1 mL (60 mg total) into the skin every 6 (six) months.      predniSONE (DELTASONE) 20 MG tablet Take 1 tablet (20 mg total) by mouth once daily. 9 tablet 0     No current facility-administered medications on file prior to visit.        Review of patient's allergies indicates:   Allergen Reactions    Clindamycin      rash    Metformin     Penicillins     Zyloprim [allopurinol]        Past Surgical History:   Procedure Laterality Date    CARDIAC DEFIBRILLATOR PLACEMENT      OOPHORECTOMY      TUBAL LIGATION         Family History   Problem Relation Age of Onset    Breast cancer Sister     Colon cancer Sister     Ovarian cancer Neg Hx        Social History     Socioeconomic History    Marital status:      Spouse name: Not on file    Number of children: Not on file    Years of education: Not on file    Highest education level: Not on file   Occupational History    Not on file   Social Needs    Financial resource strain: Not on file    Food insecurity:     Worry: Not on file     Inability: Not on file    Transportation needs:     Medical: Not on file     Non-medical: Not on file   Tobacco Use    Smoking status: Former Smoker    Smokeless tobacco: Never Used   Substance and Sexual Activity    Alcohol use: No    Drug use: No    Sexual activity: Never   Lifestyle    Physical activity:     Days per week: Not on file     Minutes per session: Not on file    Stress: Not on file   Relationships    Social connections:     Talks on phone: Not on file     Gets together: Not on file     Attends Congregational service: Not on file     Active member of club or organization: Not on file     Attends meetings of clubs or organizations: Not  "on file     Relationship status: Not on file   Other Topics Concern    Not on file   Social History Narrative    Not on file       Review of Systems   Constitution: Negative for chills and fever.   Cardiovascular: Positive for leg swelling. Negative for claudication.   Respiratory: Negative for cough and shortness of breath.    Skin: Positive for dry skin and nail changes. Negative for itching and rash.   Musculoskeletal: Positive for joint pain and myalgias. Negative for falls, joint swelling and muscle weakness.   Gastrointestinal: Negative for diarrhea, nausea and vomiting.   Neurological: Positive for numbness and paresthesias. Negative for tremors and weakness.   Psychiatric/Behavioral: Negative for altered mental status and hallucinations.           Objective:      Vitals:    05/13/19 1548   BP: 124/82   Weight: 108.9 kg (240 lb)   Height: 5' 4" (1.626 m)   PainSc: 0-No pain       Physical Exam   Constitutional: She appears well-developed and well-nourished.  Non-toxic appearance. She does not have a sickly appearance. No distress.   alert and oriented x 3.    Cardiovascular:   Pulses:       Dorsalis pedis pulses are 1+ on the right side, and 1+ on the left side.        Posterior tibial pulses are 1+ on the right side, and 1+ on the left side.   Dorsalis pedis and posterior tibial pulses are diminished Bilaterally. Toes are cool to touch. Feet are warm proximally.There is decreased digital hair. Skin is atrophic, slightly hyperpigmented, and mildly edematous     Pulmonary/Chest: No respiratory distress.   Musculoskeletal: She exhibits no deformity.        Right ankle: She exhibits swelling. No tenderness. No lateral malleolus, no medial malleolus, no AITFL, no CF ligament and no posterior TFL tenderness found. Achilles tendon exhibits no pain, no defect and normal Collins's test results.        Left ankle: She exhibits swelling. No tenderness. No lateral malleolus, no medial malleolus, no AITFL, no CF " ligament and no posterior TFL tenderness found. Achilles tendon exhibits no pain, no defect and normal Collins's test results.        Right foot: There is no tenderness and no bony tenderness.        Left foot: There is no tenderness and no bony tenderness.   Decreased stride, station of gait.  apropulsive toe off.  Increased angle and base of gait.    Patient has hammertoes of digits 2-5 bilateral partially reducible without symptom today.    Visible and palpable bunion without pain at dorsomedial 1st metatarsal head right and left.  Hallux abducted right and left partially reducible, tracks laterally without being track bound.  No ecchymosis, erythema, edema, or cardinal signs infection or signs of trauma same foot.    Muscle strength is 5/5 in all groups bilaterally.           Feet:   Right Foot:   Protective Sensation: 5 sites tested. 5 sites sensed.   Left Foot:   Protective Sensation: 5 sites tested. 5 sites sensed.   Lymphadenopathy:   No lymphatic streaking     Neurological: She displays no atrophy. No sensory deficit.   Light touch present    Paresthesias, and hyperesthesia bilateral feet at toes with no clearly identified trigger or source.       Skin: Skin is warm, dry and intact. No abrasion, no bruising and no rash noted. She is not diaphoretic. No cyanosis or erythema. No pallor. Nails show no clubbing.   Toenails 1-5 bilaterally are elongated by 2-3 mm, thickened by 2-3 mm, discolored/yellowed, dystrophic, brittle with subungual debris.     Focal hyperkeratotic lesion consisting entirely of hyperkeratotic tissue without open skin, drainage, pus, fluctuance, malodor, or signs of infection medial hallux IPJ keegan and plantar heel midline keegan   Psychiatric: Her mood appears not anxious. Her affect is not inappropriate. Her speech is not slurred. She is not combative. She is communicative. She is attentive.   Nursing note and vitals reviewed.          Assessment:       Encounter Diagnoses   Name Primary?    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      Greater than 50% of this visit spent on counseling and coordination of care.    Education about the diabetic foot, neuropathy, and prevention of limb loss.    Shoe inspection. Diabetic Foot Education. Patient reminded of the importance of good nutrition/healthy diet/weight management and blood sugar control to help prevent podiatric complications of diabetes. Patient instructed on proper foot hygeine. Wear comfortable, proper fitting shoes. Wash feet daily. Dry well. After drying, apply moisturizer to feet (no lotion to webspaces). Inspect feet daily for skin breaks, blisters, swelling, or redness. Wear cotton socks (preferably white)  Change socks every day. Do NOT walk barefoot. Do NOT use heating pads or hot water soaks. We discussed wearing proper shoe gear, daily foot inspections, never walking without protective shoe gear.     Discussed edema control and the continued use of compression stockings.     Discussed importance of daily moisturizer to the feet such as Gold bonds diabetic  foot cream    Rx diabetic shoes for protection and support    After cleansing the  area w/ alcohol prep pad the above mentioned hyperkeratosis was trimmed utilizing No 15 scapel, to a smooth base with out incident. Patient tolerated this  well and reported comfort to the area of medial hallux IPJ keegan and plantar heel midline keegan    With patient's permission, nails were aggressively reduced and debrided x 10 to their soft tissue attachment mechanically and with electric , removing all offending nail and debris. Patient relates relief following the procedure.     She will continue to monitor the areas daily, inspect her feet, wear protective shoe gear when ambulatory, moisturizer to maintain skin integrity and follow in this office in approximately 2-5 months, sooner p.r.n.

## 2019-05-13 NOTE — PATIENT INSTRUCTIONS
Recommend lotions: eucerin, eucerin for diabetics, aquaphor, A&D ointment, gold bond for diabetics, sween, Le [employer]'s Bees all purpose baby ointment,  urea 40 with aloe (found on amazon.com)    Shoe recommendations: (try 6pm.com, zappos.com , [email or web address].[email or web address], or shoes[email or web address] for discounted prices) you can visit [employer] in [place]  or [facility] in the [facility] (there are also several shoe brand outlets in the [facility])    Asics (GT 2000 or gel foundations), new balance stability type shoes, saucony (stabil c3),  William (GTS or Beast or transcend), propet (tennis shoe)    Sofdebi Fernandez (women) Danya&Hardik (men), clarks, crocs, aerosoles, naturalizers, SAS, ecco, born, andrew nation, rockports (dress shoes)    Vionic, burkenstocks, fitflops, propet (sandals)  Nike comfort thong sandals, crocs, propet (house shoes)    Nail Home remedy:  Vicks Vapor rub to nails for easier manageability      Diabetes: Inspecting Your Feet  Diabetes increases your chances of developing foot problems. So inspect your feet every day. This helps you find small skin irritations before they become serious infections. If you have trouble seeing the bottoms of your feet, use a mirror or ask a family member or friend to help.     Pressure spots on the bottom of the foot are common areas where problems develop.   How to check your feet  Below are tips to help you look for foot problems. Try to check your feet at the same time each day, such as when you get out of bed in the morning:  · Check the top of each foot. The tops of toes, back of the heel, and outer edge of the foot can get a lot of rubbing from poor-fitting shoes.  · Check the bottom of each foot. Daily wear and tear often leads to problems at pressure spots.  · Check the toes and nails. Fungal infections often occur between toes. Toenail problems can also be a sign of fungal infections or lead to breaks in the skin.  · Check your shoes, too. Loose objects inside a shoe can injure the  foot. Use your hand to feel inside your shoes for things like shakeel, loose stitching, or rough areas that could irritate your skin.  Warning signs  Look for any color changes in the foot. Redness with streaks can signal a severe infection, which needs immediate medical attention. Tell your doctor right away if you have any of these problems:  · Swelling, sometimes with color changes, may be a sign of poor blood flow or infection. Symptoms include tenderness and an increase in the size of your foot.  · Warm or hot areas on your feet may be signs of infection. A foot that is cold may not be getting enough blood.  · Sensations such as burning, tingling, or pins and needles can be signs of a problem. Also check for areas that may be numb.  · Hot spots are caused by friction or pressure. Look for hot spots in areas that get a lot of rubbing. Hot spots can turn into blisters, calluses, or sores.  · Cracks and sores are caused by dry or irritated skin. They are a sign that the skin is breaking down, which can lead to infection.  · Toenail problems to watch for include nails growing into the skin (ingrown toenail) and causing redness or pain. Thick, yellow, or discolored nails can signal a fungal infection.  · Drainage and odor can develop from untreated sores and ulcers. Call your doctor right away if you notice white or yellow drainage, bleeding, or unpleasant odor.   © 6025-2980 [employer]. [address] [place] [address]. All rights reserved. This information is not intended as a substitute for professional medical care. Always follow your healthcare professional's instructions.        Step-by-Step:  Inspecting Your Feet (Diabetes)    Date Last Reviewed: 10/1/2016  © 5196-3941 [employer]. [address] [place] [address]. All rights reserved. This information is not intended as a substitute for professional medical care. Always follow your healthcare professional's  instructions.

## 2019-06-17 NOTE — PROGRESS NOTES
Patient, Nyasia Frye (MRN #[id number]), presented with a recorded BMI of 41.2 kg/m^2 consistent with the definition of morbid obesity (ICD-10 E66.01). The patient's morbid obesity was monitored, evaluated, addressed and/or treated. This addendum to the medical record is made on 06/17/2019.

## 2019-08-26 ENCOUNTER — OFFICE VISIT (OUTPATIENT)
Dept: PODIATRY | Facility: CLINIC | Age: 79
End: 2019-08-26
Payer: MEDICARE

## 2019-08-26 VITALS — BODY MASS INDEX: 40.97 KG/M2 | WEIGHT: 240 LBS | HEIGHT: 64 IN

## 2019-08-26 DIAGNOSIS — E08.22 DIABETES MELLITUS DUE TO UNDERLYING CONDITION WITH STAGE 4 CHRONIC KIDNEY DISEASE, WITHOUT LONG-TERM CURRENT USE OF INSULIN: Primary | ICD-10-CM

## 2019-08-26 DIAGNOSIS — M20.11 HALLUX ABDUCTO VALGUS, RIGHT: ICD-10-CM

## 2019-08-26 DIAGNOSIS — M20.12 HALLUX ABDUCTO VALGUS, LEFT: ICD-10-CM

## 2019-08-26 DIAGNOSIS — M20.42 HAMMER TOES OF BOTH FEET: ICD-10-CM

## 2019-08-26 DIAGNOSIS — L84 CORN OR CALLUS: ICD-10-CM

## 2019-08-26 DIAGNOSIS — N18.4 DIABETES MELLITUS DUE TO UNDERLYING CONDITION WITH STAGE 4 CHRONIC KIDNEY DISEASE, WITHOUT LONG-TERM CURRENT USE OF INSULIN: Primary | ICD-10-CM

## 2019-08-26 DIAGNOSIS — M20.41 HAMMER TOES OF BOTH FEET: ICD-10-CM

## 2019-08-26 DIAGNOSIS — B35.1 NAIL DERMATOPHYTOSIS: ICD-10-CM

## 2019-08-26 PROCEDURE — 11721 PR DEBRIDEMENT OF NAILS, 6 OR MORE: ICD-10-PCS | Mod: 59,Q9,S$GLB, | Performed by: PODIATRIST

## 2019-08-26 PROCEDURE — 99499 NO LOS: ICD-10-PCS | Mod: S$GLB,,, | Performed by: PODIATRIST

## 2019-08-26 PROCEDURE — 11056 PARNG/CUTG B9 HYPRKR LES 2-4: CPT | Mod: Q9,S$GLB,, | Performed by: PODIATRIST

## 2019-08-26 PROCEDURE — 11721 DEBRIDE NAIL 6 OR MORE: CPT | Mod: 59,Q9,S$GLB, | Performed by: PODIATRIST

## 2019-08-26 PROCEDURE — 11056 PR TRIM BENIGN HYPERKERATOTIC SKIN LESION,2-4: ICD-10-PCS | Mod: Q9,S$GLB,, | Performed by: PODIATRIST

## 2019-08-26 PROCEDURE — 99499 UNLISTED E&M SERVICE: CPT | Mod: S$GLB,,, | Performed by: PODIATRIST

## 2019-08-26 PROCEDURE — 99999 PR PBB SHADOW E&M-EST. PATIENT-LVL III: CPT | Mod: PBBFAC,,, | Performed by: PODIATRIST

## 2019-08-26 PROCEDURE — 99999 PR PBB SHADOW E&M-EST. PATIENT-LVL III: ICD-10-PCS | Mod: PBBFAC,,, | Performed by: PODIATRIST

## 2019-08-27 NOTE — PROGRESS NOTES
Subjective:      Patient ID: Nyasia Frye is a 79 y.o. female.    Chief Complaint: Diabetes Mellitus (PCP Dr. Padilla ); Diabetic Foot Exam; and Nail Care    Nyasia is a 79 y.o. female who presents to the clinic for evaluation and treatment of high risk feet. Nyasia has a past medical history of *Atrial fibrillation, Allergy, Atrial fibrillation, Controlled gout, Diabetes mellitus type II, Hypercholesteremia, Hyperlipidemia, Hypertension, and Osteoporosis, senile. The patient's chief complaint is abnormal sensations to the feet as well as long, thick toenails. This patient has documented high risk feet requiring routine maintenance secondary to diabetes mellitis and those secondary complications of diabetes, as mentioned..    PCP: Marianne Padilla MD    Date Last Seen by PCP:    Chief Complaint   Patient presents with    Diabetes Mellitus     PCP Dr. Padilla     Diabetic Foot Exam    Nail Care       Current shoe gear:  Casual shoes    No results found for: HGBA1C        Patient Active Problem List   Diagnosis    Senile osteoporosis    Diabetes mellitus, type 2    Hypertension    Atrial fibrillation    Hyperlipidemia    Rash    Cellulitis       Current Outpatient Medications on File Prior to Visit   Medication Sig Dispense Refill    amiodarone (PACERONE) 200 MG Tab Take by mouth once daily.      febuxostat (ULORIC) 40 mg Tab Take 20 mg by mouth once daily.      fish oil-omega-3 fatty acids 300-1,000 mg capsule Take 2 g by mouth once daily.        furosemide (LASIX) 20 MG tablet Take 20 mg by mouth 2 (two) times daily.        furosemide (LASIX) 40 MG tablet Take 40 mg by mouth 2 (two) times daily.  3    losartan (COZAAR) 100 MG tablet Take 100 mg by mouth once daily.      pravastatin (PRAVACHOL) 40 MG tablet Take 40 mg by mouth once daily.        ranitidine (ZANTAC) 150 MG capsule Take 150 mg by mouth 2 (two) times daily.        ranitidine (ZANTAC) 150 MG tablet Take 150 mg by mouth 2 (two) times daily.  0     rivaroxaban (XARELTO) 15 mg Tab Take 10 mg by mouth once daily.      sertraline (ZOLOFT) 50 MG tablet Take 50 mg by mouth once daily.  0    TRADJENTA 5 mg Tab tablet TAKE ONE(1) TABLET BY MOUTH EVERY DAY  4    denosumab (PROLIA) 60 mg/mL Syrg Inject 1 mL (60 mg total) into the skin every 6 (six) months.      predniSONE (DELTASONE) 20 MG tablet Take 1 tablet (20 mg total) by mouth once daily. 9 tablet 0     No current facility-administered medications on file prior to visit.        Review of patient's allergies indicates:   Allergen Reactions    Clindamycin      rash    Metformin     Penicillins     Zyloprim [allopurinol]        Past Surgical History:   Procedure Laterality Date    CARDIAC DEFIBRILLATOR PLACEMENT      OOPHORECTOMY      TUBAL LIGATION         Family History   Problem Relation Age of Onset    Breast cancer Sister     Colon cancer Sister     Ovarian cancer Neg Hx        Social History     Socioeconomic History    Marital status:      Spouse name: Not on file    Number of children: Not on file    Years of education: Not on file    Highest education level: Not on file   Occupational History    Not on file   Social Needs    Financial resource strain: Not on file    Food insecurity:     Worry: Not on file     Inability: Not on file    Transportation needs:     Medical: Not on file     Non-medical: Not on file   Tobacco Use    Smoking status: Former Smoker    Smokeless tobacco: Never Used   Substance and Sexual Activity    Alcohol use: No    Drug use: No    Sexual activity: Never   Lifestyle    Physical activity:     Days per week: Not on file     Minutes per session: Not on file    Stress: Not on file   Relationships    Social connections:     Talks on phone: Not on file     Gets together: Not on file     Attends Buddhist service: Not on file     Active member of club or organization: Not on file     Attends meetings of clubs or organizations: Not on file      "Relationship status: Not on file   Other Topics Concern    Not on file   Social History Narrative    Not on file       Review of Systems   Constitution: Negative for chills and fever.   Cardiovascular: Positive for leg swelling. Negative for claudication.   Respiratory: Negative for cough and shortness of breath.    Skin: Positive for dry skin and nail changes. Negative for itching and rash.   Musculoskeletal: Positive for joint pain and myalgias. Negative for falls, joint swelling and muscle weakness.   Gastrointestinal: Negative for diarrhea, nausea and vomiting.   Neurological: Positive for numbness and paresthesias. Negative for tremors and weakness.   Psychiatric/Behavioral: Negative for altered mental status and hallucinations.           Objective:      Vitals:    08/26/19 1651   Weight: 108.9 kg (240 lb)   Height: 5' 4" (1.626 m)   PainSc: 0-No pain       Physical Exam   Constitutional: She appears well-developed and well-nourished.  Non-toxic appearance. She does not have a sickly appearance. No distress.   alert and oriented x 3.    Cardiovascular:   Pulses:       Dorsalis pedis pulses are 1+ on the right side, and 1+ on the left side.        Posterior tibial pulses are 1+ on the right side, and 1+ on the left side.   Dorsalis pedis and posterior tibial pulses are diminished Bilaterally. Toes are cool to touch. Feet are warm proximally.There is decreased digital hair. Skin is atrophic, slightly hyperpigmented, and mildly edematous     Pulmonary/Chest: No respiratory distress.   Musculoskeletal: She exhibits no deformity.        Right ankle: She exhibits swelling. No tenderness. No lateral malleolus, no medial malleolus, no AITFL, no CF ligament and no posterior TFL tenderness found. Achilles tendon exhibits no pain, no defect and normal Collins's test results.        Left ankle: She exhibits swelling. No tenderness. No lateral malleolus, no medial malleolus, no AITFL, no CF ligament and no posterior TFL " tenderness found. Achilles tendon exhibits no pain, no defect and normal Collins's test results.        Right foot: There is no tenderness and no bony tenderness.        Left foot: There is no tenderness and no bony tenderness.   Decreased stride, station of gait.  apropulsive toe off.  Increased angle and base of gait.    Patient has hammertoes of digits 2-5 bilateral partially reducible without symptom today.    Visible and palpable bunion without pain at dorsomedial 1st metatarsal head right and left.  Hallux abducted right and left partially reducible, tracks laterally without being track bound.  No ecchymosis, erythema, edema, or cardinal signs infection or signs of trauma same foot.    Muscle strength is 5/5 in all groups bilaterally.           Feet:   Right Foot:   Protective Sensation: 5 sites tested. 5 sites sensed.   Left Foot:   Protective Sensation: 5 sites tested. 5 sites sensed.   Lymphadenopathy:   No lymphatic streaking     Neurological: She displays no atrophy. No sensory deficit.   Light touch present    Paresthesias, and hyperesthesia bilateral feet at toes with no clearly identified trigger or source.       Skin: Skin is warm, dry and intact. No abrasion, no bruising and no rash noted. She is not diaphoretic. No cyanosis or erythema. No pallor. Nails show no clubbing.   Toenails 1-5 bilaterally are elongated by 2-3 mm, thickened by 2-3 mm, discolored/yellowed, dystrophic, brittle with subungual debris.     Focal hyperkeratotic lesion consisting entirely of hyperkeratotic tissue without open skin, drainage, pus, fluctuance, malodor, or signs of infection medial hallux IPJ keegan and plantar heel midline keegan   Psychiatric: Her mood appears not anxious. Her affect is not inappropriate. Her speech is not slurred. She is not combative. She is communicative. She is attentive.   Nursing note and vitals reviewed.          Assessment:       Encounter Diagnoses   Name Primary?    Diabetes mellitus due to  underlying condition with stage 4 chronic kidney disease, without long-term current use of insulin Yes    Hammer toes of both feet     Hallux abducto valgus, left     Hallux abducto valgus, right     Corn or callus     Nail dermatophytosis          Plan:       Nyasia was seen today for diabetes mellitus, diabetic foot exam and nail care.    Diagnoses and all orders for this visit:    Diabetes mellitus due to underlying condition with stage 4 chronic kidney disease, without long-term current use of insulin    Hammer toes of both feet    Hallux abducto valgus, left    Hallux abducto valgus, right    Corn or callus    Nail dermatophytosis      I counseled the patient on her conditions, their implications and medical management.      Greater than 50% of this visit spent on counseling and coordination of care.    Education about the diabetic foot, neuropathy, and prevention of limb loss.    Shoe inspection. Diabetic Foot Education. Patient reminded of the importance of good nutrition/healthy diet/weight management and blood sugar control to help prevent podiatric complications of diabetes. Patient instructed on proper foot hygeine. Wear comfortable, proper fitting shoes. Wash feet daily. Dry well. After drying, apply moisturizer to feet (no lotion to webspaces). Inspect feet daily for skin breaks, blisters, swelling, or redness. Wear cotton socks (preferably white)  Change socks every day. Do NOT walk barefoot. Do NOT use heating pads or hot water soaks. We discussed wearing proper shoe gear, daily foot inspections, never walking without protective shoe gear.     Discussed edema control and the continued use of compression stockings.     Discussed importance of daily moisturizer to the feet such as Gold bonds diabetic foot cream    After cleansing the  area w/ alcohol prep pad the above mentioned hyperkeratosis was trimmed utilizing No 15 scapel, to a smooth base with out incident. Patient tolerated this  well and  reported comfort to the area of medial hallux IPJ keegan and plantar heel midline keegan    With patient's permission, nails were aggressively reduced and debrided x 10 to their soft tissue attachment mechanically and with electric , removing all offending nail and debris. Patient relates relief following the procedure.     She will continue to monitor the areas daily, inspect her feet, wear protective shoe gear when ambulatory, moisturizer to maintain skin integrity and follow in this office in approximately 2-5 months, sooner p.r.n.

## 2020-02-10 ENCOUNTER — OFFICE VISIT (OUTPATIENT)
Dept: PODIATRY | Facility: CLINIC | Age: 80
End: 2020-02-10
Payer: MEDICARE

## 2020-02-10 VITALS — WEIGHT: 240 LBS | HEIGHT: 64 IN | BODY MASS INDEX: 40.97 KG/M2

## 2020-02-10 DIAGNOSIS — M20.12 HALLUX ABDUCTO VALGUS, LEFT: ICD-10-CM

## 2020-02-10 DIAGNOSIS — E08.22 DIABETES MELLITUS DUE TO UNDERLYING CONDITION WITH STAGE 4 CHRONIC KIDNEY DISEASE, WITHOUT LONG-TERM CURRENT USE OF INSULIN: Primary | ICD-10-CM

## 2020-02-10 DIAGNOSIS — B35.1 NAIL DERMATOPHYTOSIS: ICD-10-CM

## 2020-02-10 DIAGNOSIS — M20.41 HAMMER TOES OF BOTH FEET: ICD-10-CM

## 2020-02-10 DIAGNOSIS — M20.11 HALLUX ABDUCTO VALGUS, RIGHT: ICD-10-CM

## 2020-02-10 DIAGNOSIS — L84 CORN OR CALLUS: ICD-10-CM

## 2020-02-10 DIAGNOSIS — M20.42 HAMMER TOES OF BOTH FEET: ICD-10-CM

## 2020-02-10 DIAGNOSIS — N18.4 DIABETES MELLITUS DUE TO UNDERLYING CONDITION WITH STAGE 4 CHRONIC KIDNEY DISEASE, WITHOUT LONG-TERM CURRENT USE OF INSULIN: Primary | ICD-10-CM

## 2020-02-10 PROCEDURE — 99499 UNLISTED E&M SERVICE: CPT | Mod: S$GLB,,, | Performed by: PODIATRIST

## 2020-02-10 PROCEDURE — 11056 PR TRIM BENIGN HYPERKERATOTIC SKIN LESION,2-4: ICD-10-PCS | Mod: Q9,S$GLB,, | Performed by: PODIATRIST

## 2020-02-10 PROCEDURE — 11721 DEBRIDE NAIL 6 OR MORE: CPT | Mod: 59,Q9,S$GLB, | Performed by: PODIATRIST

## 2020-02-10 PROCEDURE — 11721 PR DEBRIDEMENT OF NAILS, 6 OR MORE: ICD-10-PCS | Mod: 59,Q9,S$GLB, | Performed by: PODIATRIST

## 2020-02-10 PROCEDURE — 99999 PR PBB SHADOW E&M-EST. PATIENT-LVL III: ICD-10-PCS | Mod: PBBFAC,,, | Performed by: PODIATRIST

## 2020-02-10 PROCEDURE — 11056 PARNG/CUTG B9 HYPRKR LES 2-4: CPT | Mod: Q9,S$GLB,, | Performed by: PODIATRIST

## 2020-02-10 PROCEDURE — 99999 PR PBB SHADOW E&M-EST. PATIENT-LVL III: CPT | Mod: PBBFAC,,, | Performed by: PODIATRIST

## 2020-02-10 PROCEDURE — 99499 NO LOS: ICD-10-PCS | Mod: S$GLB,,, | Performed by: PODIATRIST

## 2020-02-10 RX ORDER — OMEPRAZOLE 20 MG/1
CAPSULE, DELAYED RELEASE ORAL
Status: ON HOLD | COMMUNITY
Start: 2019-12-26 | End: 2022-08-15

## 2020-02-10 RX ORDER — POLYETHYLENE GLYCOL-3350 AND ELECTROLYTES WITH FLAVOR PACK 240; 5.84; 2.98; 6.72; 22.72 G/278.26G; G/278.26G; G/278.26G; G/278.26G; G/278.26G
POWDER, FOR SOLUTION ORAL
Status: ON HOLD | COMMUNITY
Start: 2019-12-19 | End: 2023-05-03 | Stop reason: ALTCHOICE

## 2020-02-10 RX ORDER — CAPTOPRIL 25 MG/1
25 TABLET ORAL 2 TIMES DAILY
Status: ON HOLD | COMMUNITY
Start: 2019-12-22 | End: 2022-08-15

## 2020-02-10 RX ORDER — ESCITALOPRAM OXALATE 10 MG/1
10 TABLET ORAL DAILY
Status: ON HOLD | COMMUNITY
Start: 2019-12-02 | End: 2022-08-15

## 2020-02-10 RX ORDER — DICYCLOMINE HYDROCHLORIDE 10 MG/1
10 CAPSULE ORAL
COMMUNITY
Start: 2019-12-24

## 2020-02-11 NOTE — PROGRESS NOTES
Subjective:      Patient ID: Nyasia Frye is a 80 y.o. female.    Chief Complaint: Diabetes Mellitus (Pcp Dr. Padilla ); Diabetic Foot Exam; and Nail Care    Nyasia is a 80 y.o. female who presents to the clinic for evaluation and treatment of high risk feet. Nyasia has a past medical history of *Atrial fibrillation, Allergy, Atrial fibrillation, Controlled gout, Diabetes mellitus type II, Hypercholesteremia, Hyperlipidemia, Hypertension, and Osteoporosis, senile. The patient's chief complaint is abnormal sensations to the feet as well as long, thick toenails. This patient has documented high risk feet requiring routine maintenance secondary to diabetes mellitis and those secondary complications of diabetes, as mentioned..    PCP: Marianne Padilla MD    Date Last Seen by PCP:  09/23/2019  Chief Complaint   Patient presents with    Diabetes Mellitus     Pcp Dr. Padilla     Diabetic Foot Exam    Nail Care       Current shoe gear:  Casual shoes    No results found for: HGBA1C        Patient Active Problem List   Diagnosis    Senile osteoporosis    Diabetes mellitus, type 2    Hypertension    Atrial fibrillation    Hyperlipidemia    Rash    Cellulitis       Current Outpatient Medications on File Prior to Visit   Medication Sig Dispense Refill    amiodarone (PACERONE) 200 MG Tab Take by mouth once daily.      captopriL (CAPOTEN) 25 MG tablet Take 25 mg by mouth 2 (two) times daily.      dicyclomine (BENTYL) 10 MG capsule       escitalopram oxalate (LEXAPRO) 10 MG tablet Take 10 mg by mouth once daily.      febuxostat (ULORIC) 40 mg Tab Take 20 mg by mouth once daily.      fish oil-omega-3 fatty acids 300-1,000 mg capsule Take 2 g by mouth once daily.        furosemide (LASIX) 20 MG tablet Take 20 mg by mouth 2 (two) times daily.        furosemide (LASIX) 40 MG tablet Take 40 mg by mouth 2 (two) times daily.  3    GAVILYTE-C 240-22.72-6.72 -5.84 gram SolR USE AS DIRECTED      losartan (COZAAR) 100 MG tablet  Take 100 mg by mouth once daily.      mv-min-folic acid-lutein 500-250 mcg Chew Take 1 tablet by mouth.      omeprazole (PRILOSEC) 20 MG capsule TAKE 1 CAPSULE BY MOUTH EVERY DAY IN THE MORNING      pravastatin (PRAVACHOL) 40 MG tablet Take 40 mg by mouth once daily.        ranitidine (ZANTAC) 150 MG capsule Take 150 mg by mouth 2 (two) times daily.        ranitidine (ZANTAC) 150 MG tablet Take 150 mg by mouth 2 (two) times daily.  0    rivaroxaban (XARELTO) 15 mg Tab Take 10 mg by mouth once daily.      sertraline (ZOLOFT) 50 MG tablet Take 50 mg by mouth once daily.  0    TRADJENTA 5 mg Tab tablet TAKE ONE(1) TABLET BY MOUTH EVERY DAY  4    denosumab (PROLIA) 60 mg/mL Syrg Inject 1 mL (60 mg total) into the skin every 6 (six) months.      predniSONE (DELTASONE) 20 MG tablet Take 1 tablet (20 mg total) by mouth once daily. 9 tablet 0     No current facility-administered medications on file prior to visit.        Review of patient's allergies indicates:   Allergen Reactions    Clindamycin      rash    Metformin     Penicillins     Zyloprim [allopurinol]        Past Surgical History:   Procedure Laterality Date    CARDIAC DEFIBRILLATOR PLACEMENT      OOPHORECTOMY      TUBAL LIGATION         Family History   Problem Relation Age of Onset    Breast cancer Sister     Colon cancer Sister     Ovarian cancer Neg Hx        Social History     Socioeconomic History    Marital status:      Spouse name: Not on file    Number of children: Not on file    Years of education: Not on file    Highest education level: Not on file   Occupational History    Not on file   Social Needs    Financial resource strain: Not on file    Food insecurity:     Worry: Not on file     Inability: Not on file    Transportation needs:     Medical: Not on file     Non-medical: Not on file   Tobacco Use    Smoking status: Former Smoker    Smokeless tobacco: Never Used   Substance and Sexual Activity    Alcohol use:  "No    Drug use: No    Sexual activity: Never   Lifestyle    Physical activity:     Days per week: Not on file     Minutes per session: Not on file    Stress: Not on file   Relationships    Social connections:     Talks on phone: Not on file     Gets together: Not on file     Attends Mandaeism service: Not on file     Active member of club or organization: Not on file     Attends meetings of clubs or organizations: Not on file     Relationship status: Not on file   Other Topics Concern    Not on file   Social History Narrative    Not on file       Review of Systems   Constitution: Negative for chills and fever.   Cardiovascular: Positive for leg swelling. Negative for claudication.   Respiratory: Negative for cough and shortness of breath.    Skin: Positive for dry skin and nail changes. Negative for itching and rash.   Musculoskeletal: Positive for joint pain and myalgias. Negative for falls, joint swelling and muscle weakness.   Gastrointestinal: Negative for diarrhea, nausea and vomiting.   Neurological: Positive for numbness and paresthesias. Negative for tremors and weakness.   Psychiatric/Behavioral: Negative for altered mental status and hallucinations.           Objective:      Vitals:    02/10/20 1428   Weight: 108.9 kg (240 lb)   Height: 5' 4" (1.626 m)   PainSc: 0-No pain       Physical Exam   Constitutional: She appears well-developed and well-nourished.  Non-toxic appearance. She does not have a sickly appearance. No distress.   alert and oriented x 3.    Cardiovascular:   Pulses:       Dorsalis pedis pulses are 1+ on the right side, and 1+ on the left side.        Posterior tibial pulses are 1+ on the right side, and 1+ on the left side.   Dorsalis pedis and posterior tibial pulses are diminished Bilaterally. Toes are cool to touch. Feet are warm proximally.There is decreased digital hair. Skin is atrophic, slightly hyperpigmented, and mildly edematous     Pulmonary/Chest: No respiratory " distress.   Musculoskeletal: She exhibits no deformity.        Right ankle: She exhibits swelling. No tenderness. No lateral malleolus, no medial malleolus, no AITFL, no CF ligament and no posterior TFL tenderness found. Achilles tendon exhibits no pain, no defect and normal Collins's test results.        Left ankle: She exhibits swelling. No tenderness. No lateral malleolus, no medial malleolus, no AITFL, no CF ligament and no posterior TFL tenderness found. Achilles tendon exhibits no pain, no defect and normal Collins's test results.        Right foot: There is no tenderness and no bony tenderness.        Left foot: There is no tenderness and no bony tenderness.   Decreased stride, station of gait.  apropulsive toe off.  Increased angle and base of gait.    Patient has hammertoes of digits 2-5 bilateral partially reducible without symptom today.    Visible and palpable bunion without pain at dorsomedial 1st metatarsal head right and left.  Hallux abducted right and left partially reducible, tracks laterally without being track bound.  No ecchymosis, erythema, edema, or cardinal signs infection or signs of trauma same foot.    Muscle strength is 5/5 in all groups bilaterally.           Feet:   Right Foot:   Protective Sensation: 5 sites tested. 5 sites sensed.   Left Foot:   Protective Sensation: 5 sites tested. 5 sites sensed.   Lymphadenopathy:   No lymphatic streaking     Neurological: She displays no atrophy. No sensory deficit.   Light touch present    Paresthesias, and hyperesthesia bilateral feet at toes with no clearly identified trigger or source.       Skin: Skin is warm, dry and intact. No abrasion, no bruising and no rash noted. She is not diaphoretic. No cyanosis or erythema. No pallor. Nails show no clubbing.   Toenails 1-5 bilaterally are elongated by 2-3 mm, thickened by 2-3 mm, discolored/yellowed, dystrophic, brittle with subungual debris.     Focal hyperkeratotic lesion consisting entirely of  hyperkeratotic tissue without open skin, drainage, pus, fluctuance, malodor, or signs of infection medial hallux IPJ keegan and plantar heel midline keegan   Psychiatric: Her mood appears not anxious. Her affect is not inappropriate. Her speech is not slurred. She is not combative. She is communicative. She is attentive.   Nursing note and vitals reviewed.          Assessment:       Encounter Diagnoses   Name Primary?   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    Diagnoses and all orders for this visit:    Diabetes mellitus due to underlying condition with stage 4 chronic kidney disease, without long-term current use of insulin    Hammer toes of both feet    Hallux abducto valgus, left    Hallux abducto valgus, right    Corn or callus    Nail dermatophytosis      I counseled the patient on her conditions, their implications and medical management.      Greater than 50% of this visit spent on counseling and coordination of care.    Education about the diabetic foot, neuropathy, and prevention of limb loss.    Shoe inspection. Diabetic Foot Education. Patient reminded of the importance of good nutrition/healthy diet/weight management and blood sugar control to help prevent podiatric complications of diabetes. Patient instructed on proper foot hygeine. Wear comfortable, proper fitting shoes. Wash feet daily. Dry well. After drying, apply moisturizer to feet (no lotion to webspaces). Inspect feet daily for skin breaks, blisters, swelling, or redness. Wear cotton socks (preferably white)  Change socks every day. Do NOT walk barefoot. Do NOT use heating pads or hot water soaks. We discussed wearing proper  shoe gear, daily foot inspections, never walking without protective shoe gear.     Discussed edema control and the continued use of compression stockings.     Discussed importance of daily moisturizer to the feet such as Gold bonds diabetic foot cream    After cleansing the  area w/ alcohol prep pad the above mentioned hyperkeratosis was trimmed utilizing No 15 scapel, to a smooth base with out incident. Patient tolerated this  well and reported comfort to the area of medial hallux IPJ keegan and plantar heel midline keegan    With patient's permission, nails were aggressively reduced and debrided x 10 to their soft tissue attachment mechanically and with electric , removing all offending nail and debris. Patient relates relief following the procedure.     She will continue to monitor the areas daily, inspect her feet, wear protective shoe gear when ambulatory, moisturizer to maintain skin integrity and follow in this office in approximately 2-5 months, sooner p.r.n.

## 2021-02-09 ENCOUNTER — OFFICE VISIT (OUTPATIENT)
Dept: URGENT CARE | Facility: CLINIC | Age: 81
End: 2021-02-09
Payer: MEDICARE

## 2021-02-09 VITALS
DIASTOLIC BLOOD PRESSURE: 75 MMHG | HEART RATE: 95 BPM | OXYGEN SATURATION: 99 % | RESPIRATION RATE: 16 BRPM | SYSTOLIC BLOOD PRESSURE: 137 MMHG | TEMPERATURE: 98 F

## 2021-02-09 DIAGNOSIS — R06.00 DYSPNEA, UNSPECIFIED TYPE: ICD-10-CM

## 2021-02-09 DIAGNOSIS — Z11.9 ENCOUNTER FOR SCREENING EXAMINATION FOR INFECTIOUS DISEASE: Primary | ICD-10-CM

## 2021-02-09 PROBLEM — E55.9 VITAMIN D DEFICIENCY: Status: ACTIVE | Noted: 2020-07-06

## 2021-02-09 PROBLEM — I42.8 NONISCHEMIC CARDIOMYOPATHY: Status: ACTIVE | Noted: 2021-02-09

## 2021-02-09 PROBLEM — N18.30 CKD (CHRONIC KIDNEY DISEASE), STAGE III: Status: ACTIVE | Noted: 2020-11-13

## 2021-02-09 PROBLEM — R94.6 ABNORMAL THYROID FUNCTION TEST: Status: ACTIVE | Noted: 2020-07-06

## 2021-02-09 PROBLEM — M10.9 GOUT: Status: ACTIVE | Noted: 2020-11-12

## 2021-02-09 PROBLEM — Z95.810 ICD (IMPLANTABLE CARDIOVERTER-DEFIBRILLATOR) IN PLACE: Status: ACTIVE | Noted: 2020-11-12

## 2021-02-09 PROBLEM — I50.22 CHRONIC SYSTOLIC HEART FAILURE: Status: ACTIVE | Noted: 2020-11-12

## 2021-02-09 PROBLEM — M54.9 CHRONIC BACK PAIN: Status: ACTIVE | Noted: 2021-02-09

## 2021-02-09 PROBLEM — K21.9 GASTROESOPHAGEAL REFLUX DISEASE: Status: ACTIVE | Noted: 2020-11-12

## 2021-02-09 PROBLEM — K58.9 IBS (IRRITABLE BOWEL SYNDROME): Status: ACTIVE | Noted: 2021-02-09

## 2021-02-09 PROBLEM — G89.29 CHRONIC BACK PAIN: Status: ACTIVE | Noted: 2021-02-09

## 2021-02-09 PROBLEM — N25.81 HYPERPARATHYROIDISM DUE TO RENAL INSUFFICIENCY: Status: ACTIVE | Noted: 2020-11-12

## 2021-02-09 PROBLEM — I12.9 BENIGN HYPERTENSIVE RENAL DISEASE: Status: ACTIVE | Noted: 2020-11-12

## 2021-02-09 PROBLEM — E66.01 MORBID OBESITY: Status: ACTIVE | Noted: 2021-02-09

## 2021-02-09 LAB
CTP QC/QA: YES
SARS-COV-2 RDRP RESP QL NAA+PROBE: NEGATIVE

## 2021-02-09 PROCEDURE — 71046 XR CHEST PA AND LATERAL: ICD-10-PCS | Mod: S$GLB,,, | Performed by: RADIOLOGY

## 2021-02-09 PROCEDURE — 99203 PR OFFICE/OUTPT VISIT, NEW, LEVL III, 30-44 MIN: ICD-10-PCS | Mod: S$GLB,,, | Performed by: PHYSICIAN ASSISTANT

## 2021-02-09 PROCEDURE — U0002 COVID-19 LAB TEST NON-CDC: HCPCS | Mod: QW,S$GLB,, | Performed by: PHYSICIAN ASSISTANT

## 2021-02-09 PROCEDURE — 99203 OFFICE O/P NEW LOW 30 MIN: CPT | Mod: S$GLB,,, | Performed by: PHYSICIAN ASSISTANT

## 2021-02-09 PROCEDURE — 93010 EKG 12-LEAD: ICD-10-PCS | Mod: S$GLB,,, | Performed by: INTERNAL MEDICINE

## 2021-02-09 PROCEDURE — 93005 ELECTROCARDIOGRAM TRACING: CPT | Mod: S$GLB,,, | Performed by: PHYSICIAN ASSISTANT

## 2021-02-09 PROCEDURE — 93010 ELECTROCARDIOGRAM REPORT: CPT | Mod: S$GLB,,, | Performed by: INTERNAL MEDICINE

## 2021-02-09 PROCEDURE — 71046 X-RAY EXAM CHEST 2 VIEWS: CPT | Mod: S$GLB,,, | Performed by: RADIOLOGY

## 2021-02-09 PROCEDURE — U0002: ICD-10-PCS | Mod: QW,S$GLB,, | Performed by: PHYSICIAN ASSISTANT

## 2021-02-09 PROCEDURE — 93005 EKG 12-LEAD: ICD-10-PCS | Mod: S$GLB,,, | Performed by: PHYSICIAN ASSISTANT

## 2021-03-16 ENCOUNTER — OFFICE VISIT (OUTPATIENT)
Dept: PODIATRY | Facility: CLINIC | Age: 81
End: 2021-03-16
Payer: MEDICARE

## 2021-03-16 VITALS
BODY MASS INDEX: 40.97 KG/M2 | WEIGHT: 240 LBS | DIASTOLIC BLOOD PRESSURE: 74 MMHG | HEIGHT: 64 IN | SYSTOLIC BLOOD PRESSURE: 138 MMHG

## 2021-03-16 DIAGNOSIS — B35.3 TINEA PEDIS OF BOTH FEET: ICD-10-CM

## 2021-03-16 DIAGNOSIS — M20.11 HALLUX ABDUCTO VALGUS, RIGHT: ICD-10-CM

## 2021-03-16 DIAGNOSIS — M20.12 HALLUX ABDUCTO VALGUS, LEFT: ICD-10-CM

## 2021-03-16 DIAGNOSIS — N18.4 DIABETES MELLITUS DUE TO UNDERLYING CONDITION WITH STAGE 4 CHRONIC KIDNEY DISEASE, WITHOUT LONG-TERM CURRENT USE OF INSULIN: Primary | ICD-10-CM

## 2021-03-16 DIAGNOSIS — M20.42 HAMMER TOES OF BOTH FEET: ICD-10-CM

## 2021-03-16 DIAGNOSIS — L84 CORN OR CALLUS: ICD-10-CM

## 2021-03-16 DIAGNOSIS — E08.22 DIABETES MELLITUS DUE TO UNDERLYING CONDITION WITH STAGE 4 CHRONIC KIDNEY DISEASE, WITHOUT LONG-TERM CURRENT USE OF INSULIN: Primary | ICD-10-CM

## 2021-03-16 DIAGNOSIS — B35.1 NAIL DERMATOPHYTOSIS: ICD-10-CM

## 2021-03-16 DIAGNOSIS — M20.41 HAMMER TOES OF BOTH FEET: ICD-10-CM

## 2021-03-16 PROCEDURE — 3078F PR MOST RECENT DIASTOLIC BLOOD PRESSURE < 80 MM HG: ICD-10-PCS | Mod: CPTII,S$GLB,, | Performed by: PODIATRIST

## 2021-03-16 PROCEDURE — 3075F PR MOST RECENT SYSTOLIC BLOOD PRESS GE 130-139MM HG: ICD-10-PCS | Mod: CPTII,S$GLB,, | Performed by: PODIATRIST

## 2021-03-16 PROCEDURE — 11056 PARNG/CUTG B9 HYPRKR LES 2-4: CPT | Mod: Q9,S$GLB,, | Performed by: PODIATRIST

## 2021-03-16 PROCEDURE — 1101F PR PT FALLS ASSESS DOC 0-1 FALLS W/OUT INJ PAST YR: ICD-10-PCS | Mod: CPTII,S$GLB,, | Performed by: PODIATRIST

## 2021-03-16 PROCEDURE — 11721 PR DEBRIDEMENT OF NAILS, 6 OR MORE: ICD-10-PCS | Mod: 59,Q9,S$GLB, | Performed by: PODIATRIST

## 2021-03-16 PROCEDURE — 1126F AMNT PAIN NOTED NONE PRSNT: CPT | Mod: S$GLB,,, | Performed by: PODIATRIST

## 2021-03-16 PROCEDURE — 3078F DIAST BP <80 MM HG: CPT | Mod: CPTII,S$GLB,, | Performed by: PODIATRIST

## 2021-03-16 PROCEDURE — 99999 PR PBB SHADOW E&M-EST. PATIENT-LVL IV: ICD-10-PCS | Mod: PBBFAC,,, | Performed by: PODIATRIST

## 2021-03-16 PROCEDURE — 1126F PR PAIN SEVERITY QUANTIFIED, NO PAIN PRESENT: ICD-10-PCS | Mod: S$GLB,,, | Performed by: PODIATRIST

## 2021-03-16 PROCEDURE — 99214 PR OFFICE/OUTPT VISIT, EST, LEVL IV, 30-39 MIN: ICD-10-PCS | Mod: 25,S$GLB,, | Performed by: PODIATRIST

## 2021-03-16 PROCEDURE — 3075F SYST BP GE 130 - 139MM HG: CPT | Mod: CPTII,S$GLB,, | Performed by: PODIATRIST

## 2021-03-16 PROCEDURE — 3288F FALL RISK ASSESSMENT DOCD: CPT | Mod: CPTII,S$GLB,, | Performed by: PODIATRIST

## 2021-03-16 PROCEDURE — 3288F PR FALLS RISK ASSESSMENT DOCUMENTED: ICD-10-PCS | Mod: CPTII,S$GLB,, | Performed by: PODIATRIST

## 2021-03-16 PROCEDURE — 99999 PR PBB SHADOW E&M-EST. PATIENT-LVL IV: CPT | Mod: PBBFAC,,, | Performed by: PODIATRIST

## 2021-03-16 PROCEDURE — 11721 DEBRIDE NAIL 6 OR MORE: CPT | Mod: 59,Q9,S$GLB, | Performed by: PODIATRIST

## 2021-03-16 PROCEDURE — 1101F PT FALLS ASSESS-DOCD LE1/YR: CPT | Mod: CPTII,S$GLB,, | Performed by: PODIATRIST

## 2021-03-16 PROCEDURE — 1159F PR MEDICATION LIST DOCUMENTED IN MEDICAL RECORD: ICD-10-PCS | Mod: S$GLB,,, | Performed by: PODIATRIST

## 2021-03-16 PROCEDURE — 99214 OFFICE O/P EST MOD 30 MIN: CPT | Mod: 25,S$GLB,, | Performed by: PODIATRIST

## 2021-03-16 PROCEDURE — 11056 PR TRIM BENIGN HYPERKERATOTIC SKIN LESION,2-4: ICD-10-PCS | Mod: Q9,S$GLB,, | Performed by: PODIATRIST

## 2021-03-16 PROCEDURE — 1159F MED LIST DOCD IN RCRD: CPT | Mod: S$GLB,,, | Performed by: PODIATRIST

## 2021-03-16 RX ORDER — APIXABAN 2.5 MG/1
2.5 TABLET, FILM COATED ORAL 2 TIMES DAILY
COMMUNITY
Start: 2021-02-17

## 2021-03-16 RX ORDER — ECONAZOLE NITRATE 10 MG/G
CREAM TOPICAL 2 TIMES DAILY
Qty: 85 G | Refills: 1 | Status: SHIPPED | OUTPATIENT
Start: 2021-03-16 | End: 2021-04-15

## 2021-03-16 RX ORDER — ALBUTEROL SULFATE 90 UG/1
2 AEROSOL, METERED RESPIRATORY (INHALATION)
COMMUNITY
Start: 2020-05-20

## 2021-03-16 RX ORDER — INSULIN DEGLUDEC 100 U/ML
INJECTION, SOLUTION SUBCUTANEOUS
Status: ON HOLD | COMMUNITY
Start: 2021-02-14 | End: 2023-05-03 | Stop reason: CLARIF

## 2021-07-28 ENCOUNTER — OFFICE VISIT (OUTPATIENT)
Dept: PODIATRY | Facility: CLINIC | Age: 81
End: 2021-07-28
Payer: MEDICARE

## 2021-07-28 VITALS — HEIGHT: 64 IN | BODY MASS INDEX: 40.97 KG/M2 | WEIGHT: 240 LBS

## 2021-07-28 DIAGNOSIS — M20.11 HALLUX ABDUCTO VALGUS, RIGHT: ICD-10-CM

## 2021-07-28 DIAGNOSIS — L84 CORN OR CALLUS: ICD-10-CM

## 2021-07-28 DIAGNOSIS — N18.4 DIABETES MELLITUS DUE TO UNDERLYING CONDITION WITH STAGE 4 CHRONIC KIDNEY DISEASE, WITHOUT LONG-TERM CURRENT USE OF INSULIN: Primary | ICD-10-CM

## 2021-07-28 DIAGNOSIS — M20.42 HAMMER TOES OF BOTH FEET: ICD-10-CM

## 2021-07-28 DIAGNOSIS — B35.1 NAIL DERMATOPHYTOSIS: ICD-10-CM

## 2021-07-28 DIAGNOSIS — E08.22 DIABETES MELLITUS DUE TO UNDERLYING CONDITION WITH STAGE 4 CHRONIC KIDNEY DISEASE, WITHOUT LONG-TERM CURRENT USE OF INSULIN: Primary | ICD-10-CM

## 2021-07-28 DIAGNOSIS — M20.12 HALLUX ABDUCTO VALGUS, LEFT: ICD-10-CM

## 2021-07-28 DIAGNOSIS — M20.41 HAMMER TOES OF BOTH FEET: ICD-10-CM

## 2021-07-28 PROCEDURE — 11056 PR TRIM BENIGN HYPERKERATOTIC SKIN LESION,2-4: ICD-10-PCS | Mod: Q9,S$GLB,, | Performed by: PODIATRIST

## 2021-07-28 PROCEDURE — 99999 PR PBB SHADOW E&M-EST. PATIENT-LVL IV: ICD-10-PCS | Mod: PBBFAC,,, | Performed by: PODIATRIST

## 2021-07-28 PROCEDURE — 99999 PR PBB SHADOW E&M-EST. PATIENT-LVL IV: CPT | Mod: PBBFAC,,, | Performed by: PODIATRIST

## 2021-07-28 PROCEDURE — 11721 DEBRIDE NAIL 6 OR MORE: CPT | Mod: 59,Q9,S$GLB, | Performed by: PODIATRIST

## 2021-07-28 PROCEDURE — 99499 NO LOS: ICD-10-PCS | Mod: S$GLB,,, | Performed by: PODIATRIST

## 2021-07-28 PROCEDURE — 11721 PR DEBRIDEMENT OF NAILS, 6 OR MORE: ICD-10-PCS | Mod: 59,Q9,S$GLB, | Performed by: PODIATRIST

## 2021-07-28 PROCEDURE — 1159F PR MEDICATION LIST DOCUMENTED IN MEDICAL RECORD: ICD-10-PCS | Mod: CPTII,S$GLB,, | Performed by: PODIATRIST

## 2021-07-28 PROCEDURE — 1126F AMNT PAIN NOTED NONE PRSNT: CPT | Mod: CPTII,S$GLB,, | Performed by: PODIATRIST

## 2021-07-28 PROCEDURE — 11056 PARNG/CUTG B9 HYPRKR LES 2-4: CPT | Mod: Q9,S$GLB,, | Performed by: PODIATRIST

## 2021-07-28 PROCEDURE — 1159F MED LIST DOCD IN RCRD: CPT | Mod: CPTII,S$GLB,, | Performed by: PODIATRIST

## 2021-07-28 PROCEDURE — 1126F PR PAIN SEVERITY QUANTIFIED, NO PAIN PRESENT: ICD-10-PCS | Mod: CPTII,S$GLB,, | Performed by: PODIATRIST

## 2021-07-28 PROCEDURE — 1160F RVW MEDS BY RX/DR IN RCRD: CPT | Mod: CPTII,S$GLB,, | Performed by: PODIATRIST

## 2021-07-28 PROCEDURE — 99499 UNLISTED E&M SERVICE: CPT | Mod: S$GLB,,, | Performed by: PODIATRIST

## 2021-07-28 PROCEDURE — 1160F PR REVIEW ALL MEDS BY PRESCRIBER/CLIN PHARMACIST DOCUMENTED: ICD-10-PCS | Mod: CPTII,S$GLB,, | Performed by: PODIATRIST

## 2021-12-21 ENCOUNTER — TELEPHONE (OUTPATIENT)
Dept: PODIATRY | Facility: CLINIC | Age: 81
End: 2021-12-21
Payer: MEDICARE

## 2021-12-21 ENCOUNTER — OFFICE VISIT (OUTPATIENT)
Dept: PODIATRY | Facility: CLINIC | Age: 81
End: 2021-12-21
Payer: MEDICARE

## 2021-12-21 VITALS — BODY MASS INDEX: 40.97 KG/M2 | HEIGHT: 64 IN | WEIGHT: 240 LBS

## 2021-12-21 DIAGNOSIS — E08.22 DIABETES MELLITUS DUE TO UNDERLYING CONDITION WITH STAGE 4 CHRONIC KIDNEY DISEASE, WITHOUT LONG-TERM CURRENT USE OF INSULIN: Primary | ICD-10-CM

## 2021-12-21 DIAGNOSIS — M20.12 HALLUX ABDUCTO VALGUS, LEFT: ICD-10-CM

## 2021-12-21 DIAGNOSIS — N18.4 DIABETES MELLITUS DUE TO UNDERLYING CONDITION WITH STAGE 4 CHRONIC KIDNEY DISEASE, WITHOUT LONG-TERM CURRENT USE OF INSULIN: Primary | ICD-10-CM

## 2021-12-21 DIAGNOSIS — M20.42 HAMMER TOES OF BOTH FEET: ICD-10-CM

## 2021-12-21 DIAGNOSIS — B35.1 NAIL DERMATOPHYTOSIS: ICD-10-CM

## 2021-12-21 DIAGNOSIS — L84 CORN OR CALLUS: ICD-10-CM

## 2021-12-21 DIAGNOSIS — M20.41 HAMMER TOES OF BOTH FEET: ICD-10-CM

## 2021-12-21 PROCEDURE — 11056 PARNG/CUTG B9 HYPRKR LES 2-4: CPT | Mod: Q9,S$GLB,, | Performed by: PODIATRIST

## 2021-12-21 PROCEDURE — 99999 PR PBB SHADOW E&M-EST. PATIENT-LVL IV: CPT | Mod: PBBFAC,,, | Performed by: PODIATRIST

## 2021-12-21 PROCEDURE — 99999 PR PBB SHADOW E&M-EST. PATIENT-LVL IV: ICD-10-PCS | Mod: PBBFAC,,, | Performed by: PODIATRIST

## 2021-12-21 PROCEDURE — 11056 PR TRIM BENIGN HYPERKERATOTIC SKIN LESION,2-4: ICD-10-PCS | Mod: Q9,S$GLB,, | Performed by: PODIATRIST

## 2021-12-21 PROCEDURE — 11721 DEBRIDE NAIL 6 OR MORE: CPT | Mod: 59,Q9,S$GLB, | Performed by: PODIATRIST

## 2021-12-21 PROCEDURE — 99499 NO LOS: ICD-10-PCS | Mod: S$GLB,,, | Performed by: PODIATRIST

## 2021-12-21 PROCEDURE — 99499 UNLISTED E&M SERVICE: CPT | Mod: S$GLB,,, | Performed by: PODIATRIST

## 2021-12-21 PROCEDURE — 11721 PR DEBRIDEMENT OF NAILS, 6 OR MORE: ICD-10-PCS | Mod: 59,Q9,S$GLB, | Performed by: PODIATRIST

## 2022-06-07 ENCOUNTER — HOSPITAL ENCOUNTER (EMERGENCY)
Facility: HOSPITAL | Age: 82
Discharge: HOME | End: 2022-06-07
Attending: EMERGENCY MEDICINE
Payer: MEDICARE

## 2022-06-07 VITALS
SYSTOLIC BLOOD PRESSURE: 127 MMHG | BODY MASS INDEX: 42.91 KG/M2 | DIASTOLIC BLOOD PRESSURE: 68 MMHG | OXYGEN SATURATION: 100 % | TEMPERATURE: 98 F | WEIGHT: 250 LBS | HEART RATE: 69 BPM | RESPIRATION RATE: 16 BRPM

## 2022-06-07 DIAGNOSIS — R52 PAIN: ICD-10-CM

## 2022-06-07 DIAGNOSIS — M79.671 RIGHT FOOT PAIN: Primary | ICD-10-CM

## 2022-06-07 LAB
ALBUMIN SERPL-MCNC: 3.8 G/DL (ref 3.3–5.5)
ALP SERPL-CCNC: 85 U/L (ref 42–141)
BILIRUB SERPL-MCNC: 1.2 MG/DL (ref 0.2–1.6)
BUN SERPL-MCNC: 18 MG/DL (ref 7–22)
CALCIUM SERPL-MCNC: 9.9 MG/DL (ref 8–10.3)
CHLORIDE SERPL-SCNC: 96 MMOL/L (ref 98–108)
CREAT SERPL-MCNC: 1.1 MG/DL (ref 0.6–1.2)
CRP SERPL-MCNC: 119.2 MG/L (ref 0–8.2)
ERYTHROCYTE [SEDIMENTATION RATE] IN BLOOD BY WESTERGREN METHOD: 23 MM/HR (ref 0–20)
GLUCOSE SERPL-MCNC: 130 MG/DL (ref 73–118)
POC ALT (SGPT): 16 U/L (ref 10–47)
POC AST (SGOT): 22 U/L (ref 11–38)
POC TCO2: 28 MMOL/L (ref 18–33)
POTASSIUM BLD-SCNC: 4.2 MMOL/L (ref 3.6–5.1)
PROTEIN, POC: 7.4 G/DL (ref 6.4–8.1)
SODIUM BLD-SCNC: 137 MMOL/L (ref 128–145)
URATE SERPL-MCNC: 6.9 MG/DL (ref 2.4–5.7)

## 2022-06-07 PROCEDURE — 86140 C-REACTIVE PROTEIN: CPT | Performed by: NURSE PRACTITIONER

## 2022-06-07 PROCEDURE — 80053 COMPREHEN METABOLIC PANEL: CPT | Mod: ER

## 2022-06-07 PROCEDURE — 85025 COMPLETE CBC W/AUTO DIFF WBC: CPT | Mod: ER

## 2022-06-07 PROCEDURE — 84550 ASSAY OF BLOOD/URIC ACID: CPT | Performed by: NURSE PRACTITIONER

## 2022-06-07 PROCEDURE — 85652 RBC SED RATE AUTOMATED: CPT | Performed by: NURSE PRACTITIONER

## 2022-06-07 PROCEDURE — 25000003 PHARM REV CODE 250: Mod: ER | Performed by: NURSE PRACTITIONER

## 2022-06-07 PROCEDURE — 99284 EMERGENCY DEPT VISIT MOD MDM: CPT | Mod: 25,ER

## 2022-06-07 RX ORDER — TRAMADOL HYDROCHLORIDE 50 MG/1
50 TABLET ORAL
Status: COMPLETED | OUTPATIENT
Start: 2022-06-07 | End: 2022-06-07

## 2022-06-07 RX ORDER — DICLOFENAC SODIUM 10 MG/G
2 GEL TOPICAL DAILY
Qty: 20 G | Refills: 0 | Status: ON HOLD | OUTPATIENT
Start: 2022-06-07 | End: 2023-05-03

## 2022-06-07 RX ORDER — ACETAMINOPHEN 325 MG/1
650 TABLET ORAL
Status: COMPLETED | OUTPATIENT
Start: 2022-06-07 | End: 2022-06-07

## 2022-06-07 RX ORDER — ACETAMINOPHEN 500 MG
500 TABLET ORAL EVERY 4 HOURS PRN
Qty: 20 TABLET | Refills: 0 | Status: SHIPPED | OUTPATIENT
Start: 2022-06-07 | End: 2023-01-05

## 2022-06-07 RX ADMIN — TRAMADOL HYDROCHLORIDE 50 MG: 50 TABLET, COATED ORAL at 05:06

## 2022-06-07 RX ADMIN — ACETAMINOPHEN 325MG 650 MG: 325 TABLET ORAL at 05:06

## 2022-06-07 NOTE — FIRST PROVIDER EVALUATION
" Emergency Department TeleTriage Encounter Note      CHIEF COMPLAINT    Chief Complaint   Patient presents with    Foot Pain     Pt states," I have pain and swelling in my right foot for two days."       VITAL SIGNS   Initial Vitals [06/07/22 1442]   BP Pulse Resp Temp SpO2   138/72 69 16 99.4 °F (37.4 °C) 97 %      MAP       --            ALLERGIES    Review of patient's allergies indicates:   Allergen Reactions    Beta-adrenergic agents     Ciprofloxacin Other (See Comments)    Clindamycin      rash    Metformin     Penicillins     Verapamil     Zyloprim [allopurinol]        PROVIDER TRIAGE NOTE  This is a teletriage evaluation of a 82 y.o. female presenting to the ED complaining of right foot pain and swelling for two days. Denies trauma.     Initial orders will be placed and care will be transferred to an alternate provider when patient is roomed for a full evaluation. Any additional orders and the final disposition will be determined by that provider.           ORDERS  Labs Reviewed - No data to display    ED Orders (720h ago, onward)    Start Ordered     Status Ordering Provider    06/07/22 1509 06/07/22 1508  X-Ray Foot Complete Right  1 time imaging         Ordered MIRIAM DYSON            Virtual Visit Note: The provider triage portion of this emergency department evaluation and documentation was performed via [employer], a HIPAA-compliant telemedicine application, in concert with a tele-presenter in the room. A face to face patient evaluation with one of my colleagues will occur once the patient is placed in an emergency department room.      DISCLAIMER: This note was prepared with [employer] voice recognition transcription software. Garbled syntax, mangled pronouns, and other bizarre constructions may be attributed to that software system.  "

## 2022-06-07 NOTE — DISCHARGE INSTRUCTIONS
Please return to the Emergency Department for any new or worsening symptoms including: worsening foot pain or swelling, fever, chest pain, shortness of breath, loss of consciousness, dizziness, weakness, or any other concerns.     Please follow up with your Primary Care Provider within the week. If you do not have one, you may contact the one listed on your discharge paperwork or you may also call the [facility] Appointment Desk at [phone] to schedule an appointment with one.     Please take all medication as prescribed.

## 2022-06-07 NOTE — ED PROVIDER NOTES
"Encounter Date: 6/7/2022    SCRIBE #1 NOTE: I, Koffi Pop, am scribing for, and in the presence of,  Modesta Hernández NP. I have scribed the following portions of the note - Other sections scribed: HPI; ROS.       History     Chief Complaint   Patient presents with    Foot Pain     Pt states," I have pain and swelling in my right foot for two days."     Nyasia Frye is a 82 y.o. with controlled gout, diabetes mellitus type II, hyperlipidemia, hypercholesteremia, hypertension and osteoporosis who presents to the ED for chief complaint of right foot pain with swelling in the first digit onset 3 days ago.  She reports history of gout in this foot previously. She states that she has not taken pain medicine to relieve the pain, only ice that was not successful in relief of pain. She states that she has trouble ambulating secondary to the pain. She confirms that her sugar levels have been within normal regulatory levels. The patient denies having associated symptoms of fever, chills, or appetite change. She has consumed red meat but denies drinking EtOH or eating seafood.     The history is provided by the patient. No  was used.     Review of patient's allergies indicates:   Allergen Reactions    Beta-adrenergic agents     Ciprofloxacin Other (See Comments)    Clindamycin      rash    Metformin     Penicillins     Verapamil     Zyloprim [allopurinol]      Past Medical History:   Diagnosis Date    *Atrial fibrillation     Allergy     Atrial fibrillation     Controlled gout     Diabetes mellitus type II     Hypercholesteremia     Hyperlipidemia     Hypertension     Osteoporosis, senile      Past Surgical History:   Procedure Laterality Date    CARDIAC DEFIBRILLATOR PLACEMENT      OOPHORECTOMY      TUBAL LIGATION       Family History   Problem Relation Age of Onset    Breast cancer Sister     Colon cancer Sister     Ovarian cancer Neg Hx      Social History     Tobacco Use    " Smoking status: Former Smoker    Smokeless tobacco: Never Used   Substance Use Topics    Alcohol use: No    Drug use: No     Review of Systems   Constitutional: Negative for appetite change, chills and fever.   HENT: Negative for congestion.    Eyes: Negative for visual disturbance.   Respiratory: Negative for shortness of breath.    Cardiovascular: Negative for chest pain.   Gastrointestinal: Negative for abdominal pain.   Endocrine: Negative for polyuria.   Genitourinary: Negative for dysuria.   Musculoskeletal: Positive for arthralgias and joint swelling.   Skin: Positive for color change (redness over left foot).   Allergic/Immunologic: Negative for immunocompromised state.   Hematological: Does not bruise/bleed easily.   Psychiatric/Behavioral: Negative for confusion.   All other systems reviewed and are negative.      Physical Exam     Initial Vitals [06/07/22 1442]   BP Pulse Resp Temp SpO2   138/72 69 16 99.4 °F (37.4 °C) 97 %      MAP       --         Physical Exam    Constitutional: She appears well-developed and well-nourished. She is not diaphoretic. No distress.   HENT:   Head: Normocephalic and atraumatic.   Neck:   Normal range of motion.  Cardiovascular: Normal rate, regular rhythm, normal heart sounds and intact distal pulses.   Pulmonary/Chest: Breath sounds normal. No respiratory distress.   Abdominal: Abdomen is soft. Bowel sounds are normal.   Musculoskeletal:         General: Normal range of motion.      Cervical back: Normal range of motion.      Right ankle: Normal.      Right Achilles Tendon: Normal.      Right foot: Swelling and tenderness present.      Left foot: Normal.      Comments: Erythema, warmth, tenderness of the dorsal base of the right great toe.  Erythema extends up the mid metatarsal.  No wounds or breaks in skin integrity.  Nail intact.       Neurological: She is alert and oriented to person, place, and time.   Skin: Skin is warm and dry.   Psychiatric: She has a normal  mood and affect. Her behavior is normal.         ED Course   Procedures  Labs Reviewed   URIC ACID - Abnormal; Notable for the following components:       Result Value    Uric Acid 6.9 (*)     All other components within normal limits   C-REACTIVE PROTEIN - Abnormal; Notable for the following components:    .2 (*)     All other components within normal limits   SEDIMENTATION RATE - Abnormal; Notable for the following components:    Sed Rate 23 (*)     All other components within normal limits   POCT CMP - Abnormal; Notable for the following components:    POC Chloride 96 (*)     POC Glucose 130 (*)     All other components within normal limits   POCT CBC   POCT CMP             Imaging Results          X-Ray Foot Complete Right (Final result)  Result time 06/07/22 15:45:44    Final result by Kareem Ross MD (06/07/22 15:45:44)                 Impression:      1. Slight cortical irregularity along the lateral aspect of the proximal phalanx of the great toe, corticated nature suggests degenerative change or previous injury however correlation with any focal tenderness is recommended.  There is nonspecific edema about the foot particularly along the dorsal aspect.      Electronically signed by: Kareem Ross MD  Date:    06/07/2022  Time:    15:45             Narrative:    EXAMINATION:  XR FOOT COMPLETE 3 VIEW RIGHT    CLINICAL HISTORY:  . Pain, unspecified    TECHNIQUE:  AP, lateral, and oblique views of the right foot were performed.    COMPARISON:  None    FINDINGS:  Three views right foot.    There is osteopenia.  There is cortical irregularity along the lateral aspect of the proximal phalanx of the great toe, corticated nature suggests degenerative change.  There is edema about the foot particularly along the dorsal aspect.  No dislocation.  No radiopaque foreign body.                                 Medications   traMADoL tablet 50 mg (50 mg Oral Given 6/7/22 4718)   acetaminophen tablet 650 mg (650  mg Oral Given 6/7/22 1703)     Medical Decision Making:   History:   Old Medical Records: I decided to obtain old medical records.  Differential Diagnosis:   Gout, fracture, dislocation, septic arthritis, cellulitis, arthritis, others  Independently Interpreted Test(s):   I have ordered and independently interpreted X-rays - see prior notes.  Clinical Tests:   Lab Tests: Ordered and Reviewed  Radiological Study: Ordered and Reviewed  ED Management:  82-year-old female presenting to the ED with atraumatic right great toe pain.  Reports history of gout.  She is afebrile and well-appearing.  X-ray negative for fracture or dislocation.  There are degenerative changes of proximal phalanx of great toe.  She is afebrile.  There is no leukocytosis concerning for systemic infection.  Uric acid is elevated at 6.9.  I have high suspicion for gout.  I do not suspect cellulitis or septic joint.  Given pain medication in the ED with improvement.  I will discharge her home with Tylenol for pain.  I will avoid steroids as she is a diabetic.  I will avoid NSAIDs as she has chronic renal insufficiency and is on anticoagulants.  Follow-up with PCP and Podiatry.  Strict return precautions discussed with the patient verbalized understanding.    This case was discussed with my attending physician Dr. Kapoor who agrees with my plan of care.          Scribe Attestation:   Scribe #1: I performed the above scribed service and the documentation accurately describes the services I performed. I attest to the accuracy of the note.        ED Course as of 06/07/22 2227 Tue Jun 07, 2022   1621 X-Ray Foot Complete Right [MT]      ED Course User Index  [MT] NEHAL Quinn M Truxillo, personally performed the services described in this documentation.  All medical record entries made by the scribe were at my direction and in my presence.  I have reviewed the chart and agree that the record reflects my personal performance and is  accurate and complete.  Clinical Impression:   Final diagnoses:  [R52] Pain  [M79.671] Right foot pain (Primary)          ED Disposition Condition    Discharge Stable        ED Prescriptions     Medication Sig Dispense Start Date End Date Auth. Provider    acetaminophen (TYLENOL) 500 MG tablet Take 1 tablet (500 mg total) by mouth every 4 (four) hours as needed for Pain. 20 tablet 6/7/2022  Modesta Hernández NP    diclofenac sodium (VOLTAREN ARTHRITIS PAIN) 1 % Gel Apply 2 g topically once daily. 20 g 6/7/2022  Modesta Hernández NP        Follow-up Information     Follow up With Specialties Details Why Contact Info    Marianne Padilla MD Internal Medicine Schedule an appointment as soon as possible for a visit  For follow-up [address] [place] [address]  [id number]  [facility] [address] [phone]      Amena Judge DPM Podiatry, Wound Care Schedule an appointment as soon as possible for a visit  For follow-up [address] [facility] [address] [phone]      [facility] Emergency Medicine Go to  If symptoms worsen [address] [facility] [address] [phone]           Modesta Hernández NP  06/07/22 0375

## 2022-08-15 ENCOUNTER — HOSPITAL ENCOUNTER (OUTPATIENT)
Facility: HOSPITAL | Age: 82
Discharge: HOME | End: 2022-08-16
Attending: EMERGENCY MEDICINE | Admitting: EMERGENCY MEDICINE
Payer: MEDICARE

## 2022-08-15 DIAGNOSIS — R07.9 CHEST PAIN: ICD-10-CM

## 2022-08-15 DIAGNOSIS — R07.89 OTHER CHEST PAIN: Primary | ICD-10-CM

## 2022-08-15 DIAGNOSIS — R07.9 CHEST PAIN IN ADULT: ICD-10-CM

## 2022-08-15 PROBLEM — I25.5 ISCHEMIC CONGESTIVE CARDIOMYOPATHY: Status: ACTIVE | Noted: 2020-11-12

## 2022-08-15 PROBLEM — E66.01 MORBID OBESITY: Chronic | Status: ACTIVE | Noted: 2021-02-09

## 2022-08-15 PROBLEM — Z95.810 ICD (IMPLANTABLE CARDIOVERTER-DEFIBRILLATOR) IN PLACE: Chronic | Status: ACTIVE | Noted: 2020-11-12

## 2022-08-15 PROBLEM — N18.30 CKD (CHRONIC KIDNEY DISEASE), STAGE III: Chronic | Status: ACTIVE | Noted: 2020-11-13

## 2022-08-15 PROBLEM — K58.9 IBS (IRRITABLE BOWEL SYNDROME): Chronic | Status: ACTIVE | Noted: 2021-02-09

## 2022-08-15 PROBLEM — I42.0 ISCHEMIC CONGESTIVE CARDIOMYOPATHY: Status: ACTIVE | Noted: 2020-11-12

## 2022-08-15 PROBLEM — I42.0 ISCHEMIC CONGESTIVE CARDIOMYOPATHY: Chronic | Status: ACTIVE | Noted: 2020-11-12

## 2022-08-15 PROBLEM — I25.5 ISCHEMIC CONGESTIVE CARDIOMYOPATHY: Chronic | Status: ACTIVE | Noted: 2020-11-12

## 2022-08-15 PROBLEM — I42.8 NONISCHEMIC CARDIOMYOPATHY: Chronic | Status: ACTIVE | Noted: 2021-02-09

## 2022-08-15 LAB
ALBUMIN SERPL-MCNC: 3.8 G/DL (ref 3.3–5.5)
ALP SERPL-CCNC: 68 U/L (ref 42–141)
BILIRUB SERPL-MCNC: 1 MG/DL (ref 0.2–1.6)
BUN SERPL-MCNC: 19 MG/DL (ref 7–22)
CALCIUM SERPL-MCNC: 9.5 MG/DL (ref 8–10.3)
CHLORIDE SERPL-SCNC: 101 MMOL/L (ref 98–108)
CREAT SERPL-MCNC: 1.3 MG/DL (ref 0.6–1.2)
CTP QC/QA: YES
GLUCOSE SERPL-MCNC: 121 MG/DL (ref 73–118)
POC ALT (SGPT): 22 U/L (ref 10–47)
POC AST (SGOT): 33 U/L (ref 11–38)
POC B-TYPE NATRIURETIC PEPTIDE: 270 PG/ML (ref 0–100)
POC CARDIAC TROPONIN I: 0.04 NG/ML
POC PTINR: 1.4 (ref 0.9–1.2)
POC PTWBT: 16.9 SEC (ref 9.7–14.3)
POC TCO2: 29 MMOL/L (ref 18–33)
POCT GLUCOSE: 128 MG/DL (ref 70–110)
POCT GLUCOSE: 159 MG/DL (ref 70–110)
POCT GLUCOSE: 97 MG/DL (ref 70–110)
POTASSIUM BLD-SCNC: 3.9 MMOL/L (ref 3.6–5.1)
PROTEIN, POC: 6.6 G/DL (ref 6.4–8.1)
SAMPLE: ABNORMAL
SAMPLE: NORMAL
SARS-COV-2 RDRP RESP QL NAA+PROBE: NEGATIVE
SODIUM BLD-SCNC: 137 MMOL/L (ref 128–145)
TROPONIN I SERPL DL<=0.01 NG/ML-MCNC: 0.04 NG/ML (ref 0–0.03)
TROPONIN I SERPL DL<=0.01 NG/ML-MCNC: 0.04 NG/ML (ref 0–0.03)

## 2022-08-15 PROCEDURE — 85025 COMPLETE CBC W/AUTO DIFF WBC: CPT | Mod: ER

## 2022-08-15 PROCEDURE — 84484 ASSAY OF TROPONIN QUANT: CPT | Mod: ER

## 2022-08-15 PROCEDURE — 25000003 PHARM REV CODE 250: Performed by: PHYSICIAN ASSISTANT

## 2022-08-15 PROCEDURE — 36415 COLL VENOUS BLD VENIPUNCTURE: CPT | Performed by: PHYSICIAN ASSISTANT

## 2022-08-15 PROCEDURE — 93005 ELECTROCARDIOGRAM TRACING: CPT | Mod: ER

## 2022-08-15 PROCEDURE — 84484 ASSAY OF TROPONIN QUANT: CPT | Mod: 91 | Performed by: PHYSICIAN ASSISTANT

## 2022-08-15 PROCEDURE — 85610 PROTHROMBIN TIME: CPT | Mod: ER

## 2022-08-15 PROCEDURE — 25000003 PHARM REV CODE 250: Mod: ER | Performed by: NURSE PRACTITIONER

## 2022-08-15 PROCEDURE — 83880 ASSAY OF NATRIURETIC PEPTIDE: CPT | Mod: ER

## 2022-08-15 PROCEDURE — 80053 COMPREHEN METABOLIC PANEL: CPT | Mod: ER

## 2022-08-15 PROCEDURE — G0378 HOSPITAL OBSERVATION PER HR: HCPCS

## 2022-08-15 PROCEDURE — U0002 COVID-19 LAB TEST NON-CDC: HCPCS | Mod: ER | Performed by: EMERGENCY MEDICINE

## 2022-08-15 PROCEDURE — 82962 GLUCOSE BLOOD TEST: CPT | Mod: ER,91

## 2022-08-15 PROCEDURE — 93010 ELECTROCARDIOGRAM REPORT: CPT | Mod: ,,, | Performed by: INTERNAL MEDICINE

## 2022-08-15 PROCEDURE — 81003 URINALYSIS AUTO W/O SCOPE: CPT | Mod: ER

## 2022-08-15 PROCEDURE — 93010 EKG 12-LEAD: ICD-10-PCS | Mod: ,,, | Performed by: INTERNAL MEDICINE

## 2022-08-15 PROCEDURE — 99285 EMERGENCY DEPT VISIT HI MDM: CPT | Mod: 25,ER

## 2022-08-15 RX ORDER — INSULIN ASPART 100 [IU]/ML
0-5 INJECTION, SOLUTION INTRAVENOUS; SUBCUTANEOUS
Status: DISCONTINUED | OUTPATIENT
Start: 2022-08-15 | End: 2022-08-16 | Stop reason: HOSPADM

## 2022-08-15 RX ORDER — LOSARTAN POTASSIUM 25 MG/1
100 TABLET ORAL DAILY
Status: DISCONTINUED | OUTPATIENT
Start: 2022-08-16 | End: 2022-08-16 | Stop reason: HOSPADM

## 2022-08-15 RX ORDER — AMOXICILLIN 250 MG
1 CAPSULE ORAL DAILY PRN
Status: DISCONTINUED | OUTPATIENT
Start: 2022-08-15 | End: 2022-08-16 | Stop reason: HOSPADM

## 2022-08-15 RX ORDER — IBUPROFEN 200 MG
24 TABLET ORAL
Status: DISCONTINUED | OUTPATIENT
Start: 2022-08-15 | End: 2022-08-16 | Stop reason: HOSPADM

## 2022-08-15 RX ORDER — LANOLIN ALCOHOL/MO/W.PET/CERES
800 CREAM (GRAM) TOPICAL
Status: DISCONTINUED | OUTPATIENT
Start: 2022-08-15 | End: 2022-08-16 | Stop reason: HOSPADM

## 2022-08-15 RX ORDER — NALOXONE HCL 0.4 MG/ML
0.02 VIAL (ML) INJECTION
Status: DISCONTINUED | OUTPATIENT
Start: 2022-08-15 | End: 2022-08-16 | Stop reason: HOSPADM

## 2022-08-15 RX ORDER — FUROSEMIDE 40 MG/1
40 TABLET ORAL 2 TIMES DAILY
Status: DISCONTINUED | OUTPATIENT
Start: 2022-08-16 | End: 2022-08-16 | Stop reason: HOSPADM

## 2022-08-15 RX ORDER — ASPIRIN 325 MG
325 TABLET ORAL
Status: COMPLETED | OUTPATIENT
Start: 2022-08-15 | End: 2022-08-15

## 2022-08-15 RX ORDER — LIDOCAINE 50 MG/G
1 PATCH TOPICAL
Status: DISCONTINUED | OUTPATIENT
Start: 2022-08-15 | End: 2022-08-16 | Stop reason: HOSPADM

## 2022-08-15 RX ORDER — ACETAMINOPHEN 325 MG/1
650 TABLET ORAL EVERY 4 HOURS PRN
Status: DISCONTINUED | OUTPATIENT
Start: 2022-08-15 | End: 2022-08-16 | Stop reason: HOSPADM

## 2022-08-15 RX ORDER — IBUPROFEN 200 MG
16 TABLET ORAL
Status: DISCONTINUED | OUTPATIENT
Start: 2022-08-15 | End: 2022-08-16 | Stop reason: HOSPADM

## 2022-08-15 RX ORDER — FUROSEMIDE 20 MG/1
20 TABLET ORAL ONCE
Status: DISCONTINUED | OUTPATIENT
Start: 2022-08-15 | End: 2022-08-15

## 2022-08-15 RX ORDER — TALC
6 POWDER (GRAM) TOPICAL NIGHTLY PRN
Status: DISCONTINUED | OUTPATIENT
Start: 2022-08-15 | End: 2022-08-16 | Stop reason: HOSPADM

## 2022-08-15 RX ORDER — FUROSEMIDE 40 MG/1
40 TABLET ORAL 2 TIMES DAILY
Status: DISCONTINUED | OUTPATIENT
Start: 2022-08-16 | End: 2022-08-15

## 2022-08-15 RX ORDER — SODIUM CHLORIDE 0.9 % (FLUSH) 0.9 %
10 SYRINGE (ML) INJECTION
Status: DISCONTINUED | OUTPATIENT
Start: 2022-08-15 | End: 2022-08-16 | Stop reason: HOSPADM

## 2022-08-15 RX ORDER — GLUCAGON 1 MG
1 KIT INJECTION
Status: DISCONTINUED | OUTPATIENT
Start: 2022-08-15 | End: 2022-08-16 | Stop reason: HOSPADM

## 2022-08-15 RX ORDER — SODIUM CHLORIDE 0.9 % (FLUSH) 0.9 %
10 SYRINGE (ML) INJECTION EVERY 8 HOURS
Status: DISCONTINUED | OUTPATIENT
Start: 2022-08-15 | End: 2022-08-15

## 2022-08-15 RX ORDER — PRAVASTATIN SODIUM 40 MG/1
40 TABLET ORAL DAILY
Status: DISCONTINUED | OUTPATIENT
Start: 2022-08-16 | End: 2022-08-16 | Stop reason: HOSPADM

## 2022-08-15 RX ADMIN — FUROSEMIDE 60 MG: 40 TABLET ORAL at 06:08

## 2022-08-15 RX ADMIN — APIXABAN 2.5 MG: 2.5 TABLET, FILM COATED ORAL at 09:08

## 2022-08-15 RX ADMIN — ASPIRIN 325 MG ORAL TABLET 325 MG: 325 PILL ORAL at 01:08

## 2022-08-15 RX ADMIN — LIDOCAINE 1 PATCH: 50 PATCH TOPICAL at 06:08

## 2022-08-15 NOTE — ASSESSMENT & PLAN NOTE
Stress test 2017 with EF of 25% and without ischemia. Followed by cardiology outpatient.   Continue home losartan  Will give 20mg extra lasix tonight given mild lower extremity edema  Daily weights/strict intake and output  Repeat echo

## 2022-08-15 NOTE — PLAN OF CARE
Problem: Adult Inpatient Plan of Care  Goal: Plan of Care Review  Outcome: Ongoing, Progressing  Goal: Patient-Specific Goal (Individualized)  Outcome: Ongoing, Progressing  Goal: Absence of Hospital-Acquired Illness or Injury  Outcome: Ongoing, Progressing  Goal: Optimal Comfort and Wellbeing  Outcome: Ongoing, Progressing  Goal: Readiness for Transition of Care  Outcome: Ongoing, Progressing     Problem: Diabetes Comorbidity  Goal: Blood Glucose Level Within Targeted Range  Outcome: Ongoing, Progressing     Problem: Fall Injury Risk  Goal: Absence of Fall and Fall-Related Injury  Outcome: Ongoing, Progressing     Problem: Chest Pain  Goal: Resolution of Chest Pain Symptoms  Outcome: Ongoing, Progressing

## 2022-08-15 NOTE — ED TRIAGE NOTES
Nyasiamilton Frye, a 82 y.o. female presents to the ED via PV with CC of  Chest pain and shortness of breath that began this morning. Pt denies N/V/D. Pt does have a defibrillator and pacemaker. PMHx of diabetes, HTN, A fib, Osteoporosis, HLP,

## 2022-08-15 NOTE — ASSESSMENT & PLAN NOTE
Body mass index is 39.93 kg/m². Morbid obesity complicates all aspects of disease management from diagnostic modalities to treatment. Weight loss encouraged and health benefits explained to patient.

## 2022-08-15 NOTE — H&P
"[facility] Medicine  History & Physical    Patient Name: Nyasia Frye  MRN: [id number]  Patient Class: OP- Observation  Admission Date: 8/15/2022  Attending Physician: Keith Cristobal MD   Primary Care Provider: Marianne Padilla MD         Patient information was obtained from patient, past medical records and ER records.     Subjective:     Principal Problem:Chest pain    Chief Complaint:   Chief Complaint   Patient presents with    Chest Pain     Pt states "Onset about 10am this morning,pt states it feels like tightness, shortness of breath."           HPI: Nyasia Frye 82 y.o. female A-fib, DM, HLD, HTN, CKD, presents to the hospital with a chief complaint of chest pain that she describes as "Chest tightness". Pt stated that her chest pain started at 10 am this morning she rated it as severe and that it felt like a crushing pain. Pt states that she still feels like her chest is tight and with associated SOB. Pt did not try any alieviating interventions and it worsens with movement of her shoulders. She attempted treatment of SOB with home inhaler without improvement. She denies nausea, vomiting, abdominal pain, back pain, radiating arm pain, syncope, dizziness, diaphoresis, dysuria, melena, hematemesis. She admits to eating spicy foods over the weekend and noticed this morning her ankles were swollen.     In the ED chest x-ray showed Cardiomegaly. No significant airspace consolidation or pleural effusion identified, troponin negative, COVID negative, INR 1.4, Cr 1.3, EKG without ST elevation.       Past Medical History:   Diagnosis Date    *Atrial fibrillation     Allergy     Atrial fibrillation     Controlled gout     Diabetes mellitus type II     Hypercholesteremia     Hyperlipidemia     Hypertension     Osteoporosis, senile        Past Surgical History:   Procedure Laterality Date    CARDIAC DEFIBRILLATOR PLACEMENT      OOPHORECTOMY      TUBAL LIGATION         Review of patient's " allergies indicates:   Allergen Reactions    Beta-adrenergic agents     Ciprofloxacin Other (See Comments)    Clindamycin      rash    Metformin     Penicillins     Verapamil     Zyloprim [allopurinol]        No current facility-administered medications on file prior to encounter.     Current Outpatient Medications on File Prior to Encounter   Medication Sig    acetaminophen (TYLENOL) 500 MG tablet Take 1 tablet (500 mg total) by mouth every 4 (four) hours as needed for Pain.    albuterol (PROVENTIL/VENTOLIN HFA) 90 mcg/actuation inhaler Inhale 2 puffs into the lungs.    amiodarone (PACERONE) 200 MG Tab Take by mouth once daily.    captopriL (CAPOTEN) 25 MG tablet Take 25 mg by mouth 2 (two) times daily.    denosumab (PROLIA) 60 mg/mL Syrg Inject 1 mL (60 mg total) into the skin every 6 (six) months.    diclofenac sodium (VOLTAREN ARTHRITIS PAIN) 1 % Gel Apply 2 g topically once daily.    dicyclomine (BENTYL) 10 MG capsule     docosahexaenoic acid-epa 120-180 mg Cap Take 2 capsules by mouth.    econazole nitrate 1 % cream Apply topically 2 (two) times daily.    ELIQUIS 2.5 mg Tab Take 2.5 mg by mouth 2 (two) times daily.    escitalopram oxalate (LEXAPRO) 10 MG tablet Take 10 mg by mouth once daily.    febuxostat (ULORIC) 40 mg Tab Take 20 mg by mouth once daily.    fish oil-omega-3 fatty acids 300-1,000 mg capsule Take 2 g by mouth once daily.      furosemide (LASIX) 20 MG tablet Take 20 mg by mouth 2 (two) times daily.      furosemide (LASIX) 40 MG tablet Take 40 mg by mouth 2 (two) times daily.    GAVILYTE-C 240-22.72-6.72 -5.84 gram SolR USE AS DIRECTED    losartan (COZAAR) 100 MG tablet Take 100 mg by mouth once daily.    mv-min-folic acid-lutein 500-250 mcg Chew Take 1 tablet by mouth.    omeprazole (PRILOSEC) 20 MG capsule TAKE 1 CAPSULE BY MOUTH EVERY DAY IN THE MORNING    pravastatin (PRAVACHOL) 40 MG tablet Take 40 mg by mouth once daily.      predniSONE (DELTASONE) 20 MG tablet  Take 1 tablet (20 mg total) by mouth once daily.    ranitidine (ZANTAC) 150 MG capsule Take 150 mg by mouth 2 (two) times daily.      ranitidine (ZANTAC) 150 MG tablet Take 150 mg by mouth 2 (two) times daily.    sertraline (ZOLOFT) 50 MG tablet Take 50 mg by mouth once daily.    TRADJENTA 5 mg Tab tablet TAKE ONE(1) TABLET BY MOUTH EVERY DAY    TRESIBA FLEXTOUCH U-100 100 unit/mL (3 mL) InPn SMARTSI Unit(s) SUB-Q Every Morning     Family History       Problem Relation (Age of Onset)    Breast cancer Sister    Colon cancer Sister          Tobacco Use    Smoking status: Former Smoker    Smokeless tobacco: Never Used   Substance and Sexual Activity    Alcohol use: No    Drug use: No    Sexual activity: Never     Review of Systems   Constitutional:  Negative for chills and fever.   HENT:  Negative for nosebleeds and tinnitus.    Eyes:  Negative for photophobia and visual disturbance.   Respiratory:  Positive for shortness of breath. Negative for wheezing.    Cardiovascular:  Positive for chest pain and leg swelling. Negative for palpitations.   Gastrointestinal:  Negative for abdominal distention, nausea and vomiting.   Genitourinary:  Negative for dysuria, flank pain and hematuria.   Musculoskeletal:  Negative for gait problem and joint swelling.   Skin:  Negative for rash and wound.   Neurological:  Negative for seizures and syncope.   Objective:     Vital Signs (Most Recent):  Temp: 98.7 °F (37.1 °C) (08/15/22 1252)  Pulse: 69 (08/15/22 1402)  Resp: 17 (08/15/22 1402)  BP: (!) 141/71 (08/15/22 1402)  SpO2: 95 % (08/15/22 1402)   Vital Signs (24h Range):  Temp:  [98.7 °F (37.1 °C)] 98.7 °F (37.1 °C)  Pulse:  [69-76] 69  Resp:  [17-18] 17  SpO2:  [95 %-100 %] 95 %  BP: (141-146)/(71-82) 141/71     Weight: 104.8 kg (231 lb)  Body mass index is 39.93 kg/m².    Physical Exam  Vitals and nursing note reviewed.   Constitutional:       General: She is not in acute distress.     Appearance: She is  well-developed.   HENT:      Head: Normocephalic and atraumatic.   Eyes:      General:         Right eye: No discharge.         Left eye: No discharge.      Conjunctiva/sclera: Conjunctivae normal.   Neck:      Thyroid: No thyromegaly.   Cardiovascular:      Rate and Rhythm: Normal rate and regular rhythm.      Heart sounds: No murmur heard.  Pulmonary:      Effort: Pulmonary effort is normal. No respiratory distress.      Breath sounds: Normal breath sounds.      Comments: Tenderness to sternum of chest and external rotation of shoulders recreates pain  Chest:      Chest wall: Tenderness present.   Abdominal:      General: Bowel sounds are normal. There is no distension.      Palpations: Abdomen is soft. There is no mass.      Tenderness: There is no abdominal tenderness.   Musculoskeletal:         General: No deformity.      Cervical back: Normal range of motion.      Right lower leg: Edema present.      Left lower leg: Edema present.      Comments: Mild lower extremity to bilateral ankles   Skin:     General: Skin is warm and dry.   Neurological:      Mental Status: She is alert and oriented to person, place, and time.      Sensory: No sensory deficit.   Psychiatric:         Mood and Affect: Mood normal.         Behavior: Behavior normal.           Significant Labs:     CBC  WBC: 5.0  H&H: 12.8&38.6  PLTs: 213    CMP   Sodium: 137  Potassium: 3.9  Chloride: 101  BUN: 19  Cr: 1.3  Glucose: 128  Alk Phos: 68  Almbunin: 3.8  AST/ALT: 33/22  CO2: 29  INR: 1.4  Troponin: 0.04  BNP: 270  Calcium: 9.5  COVID negative  Significant Imaging:   Imaging Results              X-Ray Chest AP Portable (Final result)  Result time 08/15/22 13:22:24      Final result by Hernandez Mccullough MD (08/15/22 13:22:24)                   Impression:      See above      Electronically signed by: Hernandez Mccullough MD  Date:    08/15/2022  Time:    13:22               Narrative:    EXAMINATION:  XR CHEST AP PORTABLE    CLINICAL HISTORY:  Chest pain,  unspecified    TECHNIQUE:  Single frontal view of the chest was performed.    COMPARISON:  No 02/09/2021 ne    FINDINGS:  Pacemaker as before.  Cardiomegaly.  No significant airspace consolidation or pleural effusion identified                                        Assessment/Plan:     * Chest pain  Per chart review history of non-ischemic cardiomyopathy. EKG without ST elevation, troponin negative, chest x-ray without consolidation or pleural effusion  Troponin trend  Telemetry  Echo  cardiology consulted  Continue home   Suspect caused by MSK as worsened with flexion of shoulders. Also with mild lower extremity edema. Will give extra 20mg lasix tonight. Admits eating spicy foods over the weekend    Nonischemic cardiomyopathy  Stress test 2017 with EF of 25% and without ischemia. Followed by cardiology outpatient.   Continue home losartan  Will give 20mg extra lasix tonight given mild lower extremity edema  Daily weights/strict intake and output  Repeat echo    ICD (implantable cardioverter-defibrillator) in place  Will interrogate device    Morbid obesity  Body mass index is 39.93 kg/m². Morbid obesity complicates all aspects of disease management from diagnostic modalities to treatment. Weight loss encouraged and health benefits explained to patient.     CKD (chronic kidney disease), stage III  Cr today of 1.3. baseline of 1.1-1.3.   Renal dose medications, avoid nephrotoixc drugs monitor intake and output    Hyperlipidemia  continue home statin    Atrial fibrillation  Continue home eliquis  Monitor on telemetry    Hypertension  Fair control continue home losartan    Diabetes mellitus, type 2  Lab Results   Component Value Date    HGBA1C 7.1 (H) 10/02/2021     Will start sliding scale insulin, diabetic diet, goal -180      VTE Risk Mitigation (From admission, onward)         Ordered     apixaban tablet 2.5 mg  2 times daily         08/15/22 1748     IP VTE HIGH RISK PATIENT  Once         08/15/22 5371      Place sequential compression device  Until discontinued         08/15/22 1521     Place ANT hose  Until discontinued         08/15/22 1521              VTE: eliquis  Code: full  Diet: diabetic  Dispo: pending troponin trend  As clarification, on  8/15/2022, patient should be admitted for hospital observation services under my care in collaboration with Keith Cristobal MD. Mike Cárdenas PA-C  Department of Hospital Medicine   [facility] [place] [facility]

## 2022-08-15 NOTE — ASSESSMENT & PLAN NOTE
Cr today of 1.3. baseline of 1.1-1.3.   Renal dose medications, avoid nephrotoixc drugs monitor intake and output

## 2022-08-15 NOTE — ED PROVIDER NOTES
"Encounter Date: 8/15/2022    SCRIBE #1 NOTE: I, Linh Ramos, am scribing for, and in the presence of,  Darlene Kapoor MD. I have scribed the following portions of the note - Other sections scribed: HPI; ROS; PE.       History     Chief Complaint   Patient presents with    Chest Pain     Pt states "Onset about 10am this morning,pt states it feels like tightness, shortness of breath."        Nyasia Frye is a 82 y.o. female with Hx of A-fib, DM, HLD, and HTN who presents to the ED for chief complaint of chest pain and weakness onset about 6 hours PTA. Patient reports her chest felt tight, but currently the pain has decreased in severity. Patient's son states the patient has had chest pain in the past, but not as severe as this episode. Patient denies nausea, vomiting, or diaphoresis.     The history is provided by the patient and a relative. No  was used.     Review of patient's allergies indicates:   Allergen Reactions    Beta-adrenergic agents     Ciprofloxacin Other (See Comments)    Clindamycin      rash    Metformin     Penicillins     Verapamil     Zyloprim [allopurinol]      Past Medical History:   Diagnosis Date    *Atrial fibrillation     Allergy     Atrial fibrillation     Controlled gout     Diabetes mellitus type II     Hypercholesteremia     Hyperlipidemia     Hypertension     Osteoporosis, senile      Past Surgical History:   Procedure Laterality Date    CARDIAC DEFIBRILLATOR PLACEMENT      OOPHORECTOMY      TUBAL LIGATION       Family History   Problem Relation Age of Onset    Breast cancer Sister     Colon cancer Sister     Ovarian cancer Neg Hx      Social History     Tobacco Use    Smoking status: Former Smoker    Smokeless tobacco: Never Used   Substance Use Topics    Alcohol use: No    Drug use: No     Review of Systems   Constitutional: Negative for chills, diaphoresis and fever.   HENT: Negative for congestion and sore throat.    Eyes: Negative for " pain.   Respiratory: Positive for chest tightness. Negative for shortness of breath and wheezing.    Cardiovascular: Positive for chest pain.   Gastrointestinal: Negative for abdominal pain, nausea and vomiting.   Genitourinary: Negative for flank pain.   Musculoskeletal: Negative for myalgias.   Skin: Negative for color change.   Neurological: Positive for weakness. Negative for headaches.   Hematological: Does not bruise/bleed easily.   Psychiatric/Behavioral: Negative for suicidal ideas.   All other systems reviewed and are negative.      Physical Exam     Initial Vitals [08/15/22 1252]   BP Pulse Resp Temp SpO2   (!) 146/75 71 18 98.7 °F (37.1 °C) 100 %      MAP       --         Physical Exam    Nursing note and vitals reviewed.  Constitutional: She appears well-developed. She is Obese .   Elderly.    HENT:   Head: Normocephalic.   Mouth/Throat: Oropharynx is clear and moist.   Eyes: Conjunctivae are normal.   Neck:   Normal range of motion.  Cardiovascular: Regular rhythm. Exam reveals no decreased pulses.    No murmur heard.  Pulmonary/Chest: Effort normal and breath sounds normal. No respiratory distress.   Abdominal: Abdomen is soft. She exhibits no distension.   Musculoskeletal:         General: Normal range of motion.      Cervical back: Normal range of motion.     Neurological: She is alert.   Skin: Skin is warm and dry.   Psychiatric: Her mood appears anxious.         ED Course   Procedures  Labs Reviewed   POCT GLUCOSE - Abnormal; Notable for the following components:       Result Value    POCT Glucose 128 (*)     All other components within normal limits   ISTAT PROCEDURE - Abnormal; Notable for the following components:    POC PTWBT 16.9 (*)     POC PTINR 1.4 (*)     All other components within normal limits   POCT CMP - Abnormal; Notable for the following components:    POC Creatinine 1.3 (*)     POC Glucose 121 (*)     All other components within normal limits   POCT B-TYPE NATRIURETIC PEPTIDE (BNP)  - Abnormal; Notable for the following components:    POC B-Type Natriuretic Peptide 270 (*)     All other components within normal limits   TROPONIN ISTAT   POCT CBC   SARS-COV-2 RDRP GENE    Narrative:     This test utilizes isothermal nucleic acid amplification   technology to detect the SARS-CoV-2 RdRp nucleic acid segment.   The analytical sensitivity (limit of detection) is 125 genome   equivalents/mL.   A POSITIVE result implies infection with the SARS-CoV-2 virus;   the patient is presumed to be contagious.     A NEGATIVE result means that SARS-CoV-2 nucleic acids are not   present above the limit of detection. A NEGATIVE result should be   treated as presumptive. It does not rule out the possibility of   COVID-19 and should not be the sole basis for treatment decisions.   If COVID-19 is strongly suspected based on clinical and exposure   history, re-testing using an alternate molecular assay should be   considered.   This test is only for use under the Food and Drug   Administration s Emergency Use Authorization (EUA).   Commercial kits are provided by [employer].   Performance characteristics of the EUA have been independently   verified by [facility] Department of   Pathology and Laboratory Medicine.   _________________________________________________________________   The authorized Fact Sheet for Healthcare Providers and the authorized Fact   Sheet for Patients of the ID NOW COVID-19 are available on the FDA   website:     https://www.fda.gov/media/443888/download  https://www.fda.gov/media/137117/download          POCT GLUCOSE MONITORING CONTINUOUS   POCT CMP   POCT PROTIME-INR   POCT TROPONIN   POCT B-TYPE NATRIURETIC PEPTIDE (BNP)         EKG Readings: (Independently Interpreted)   Initial Reading: No STEMI. Rhythm: Paced Rhythm. Heart Rate: 81.       Imaging Results          X-Ray Chest AP Portable (Final result)  Result time 08/15/22 13:22:24    Final result by Hernandez Mccullough MD  (08/15/22 13:22:24)                 Impression:      See above      Electronically signed by: Hernandez Mccullough MD  Date:    08/15/2022  Time:    13:22             Narrative:    EXAMINATION:  XR CHEST AP PORTABLE    CLINICAL HISTORY:  Chest pain, unspecified    TECHNIQUE:  Single frontal view of the chest was performed.    COMPARISON:  No 02/09/2021 ne    FINDINGS:  Pacemaker as before.  Cardiomegaly.  No significant airspace consolidation or pleural effusion identified                                 Medications   aspirin tablet 325 mg (325 mg Oral Given 8/15/22 1307)     Medical Decision Making:   History:   Old Medical Records: I decided to obtain old medical records.  Initial Assessment:   This is an emergent evaluation of an 82-year-old woman who presented to the emergency department today secondary to acute chest pain.  Differential Diagnosis:   Differential diagnoses include ACS, pulmonary embolus, pneumothorax, costochondritis, musculoskeletal strain vs sprain.   Independently Interpreted Test(s):   I have ordered and independently interpreted X-rays - see prior notes.  I have ordered and independently interpreted EKG Reading(s) - see prior notes  Clinical Tests:   Lab Tests: Ordered and Reviewed  Radiological Study: Ordered and Reviewed  Medical Tests: Ordered and Reviewed  ED Management:  On physical exam, pt is in no acute distress. There is no reproducible TTP of chest wall. There are no vesicular lesions. Heart sounds are within normal limits without murmurs, rhonchi, or rales. Lungs are CTAB. EKG and CXR were unremarkable. Troponin and BNP were wnl and labs noncontributory.  Given patient's multiple comorbidities and advanced age, I do believe that she would benefit from a chest pain admission.  I have discussed her care with BRENDA Fernández with the Observation team, who has agreed to admit this patient at this time.     Darlene Kapoor MD  2:39 PM  8/15/2022               Scribe Attestation:   Scribe #1: I  performed the above scribed service and the documentation accurately describes the services I performed. I attest to the accuracy of the note.               I, Darlene Kapoor MD, personally performed the services described in the documentation.  All medical records entries made by the scribe were made at my direction and in my presence.  I have reviewed the chart and agree that the record reflects my personal performance and is accurate and complete.    Clinical Impression:   Final diagnoses:  [R07.9] Chest pain  [R07.89] Other chest pain (Primary)          ED Disposition Condition    Observation               Darlene Kapoor MD  08/15/22 6524

## 2022-08-15 NOTE — ASSESSMENT & PLAN NOTE
Per chart review history of non-ischemic cardiomyopathy. EKG without ST elevation, troponin negative, chest x-ray without consolidation or pleural effusion  Troponin trend  Telemetry  Echo  cardiology consulted  Continue home   Suspect caused by MSK as worsened with flexion of shoulders. Also with mild lower extremity edema. Will give extra 20mg lasix tonight. Admits eating spicy foods over the weekend

## 2022-08-15 NOTE — ASSESSMENT & PLAN NOTE
Lab Results   Component Value Date    HGBA1C 7.1 (H) 10/02/2021     Will start sliding scale insulin, diabetic diet, goal -180

## 2022-08-15 NOTE — HPI
"Nyasia Frye 82 y.o. female A-fib, DM, HLD, HTN, CKD, presents to the hospital with a chief complaint of chest pain that she describes as "Chest tightness". Pt stated that her chest pain started at 10 am this morning she rated it as severe and that it felt like a crushing pain. Pt states that she still feels like her chest is tight and with associated SOB. Pt did not try any alieviating interventions and it worsens with movement of her shoulders. She attempted treatment of SOB with home inhaler without improvement. She denies nausea, vomiting, abdominal pain, back pain, radiating arm pain, syncope, dizziness, diaphoresis, dysuria, melena, hematemesis. She admits to eating spicy foods over the weekend and noticed this morning her ankles were swollen.     In the ED chest x-ray showed Cardiomegaly. No significant airspace consolidation or pleural effusion identified, troponin negative, COVID negative, INR 1.4, Cr 1.3, EKG without ST elevation.   "

## 2022-08-15 NOTE — NURSING
Attempted to called device company for interrogation. Pt is not sure the company's name and info of defib/PM  device. States she will have someone bring the card from home with the information on it

## 2022-08-15 NOTE — SUBJECTIVE & OBJECTIVE
Past Medical History:   Diagnosis Date    *Atrial fibrillation     Allergy     Atrial fibrillation     Controlled gout     Diabetes mellitus type II     Hypercholesteremia     Hyperlipidemia     Hypertension     Osteoporosis, senile        Past Surgical History:   Procedure Laterality Date    CARDIAC DEFIBRILLATOR PLACEMENT      OOPHORECTOMY      TUBAL LIGATION         Review of patient's allergies indicates:   Allergen Reactions    Beta-adrenergic agents     Ciprofloxacin Other (See Comments)    Clindamycin      rash    Metformin     Penicillins     Verapamil     Zyloprim [allopurinol]        No current facility-administered medications on file prior to encounter.     Current Outpatient Medications on File Prior to Encounter   Medication Sig    acetaminophen (TYLENOL) 500 MG tablet Take 1 tablet (500 mg total) by mouth every 4 (four) hours as needed for Pain.    albuterol (PROVENTIL/VENTOLIN HFA) 90 mcg/actuation inhaler Inhale 2 puffs into the lungs.    amiodarone (PACERONE) 200 MG Tab Take by mouth once daily.    captopriL (CAPOTEN) 25 MG tablet Take 25 mg by mouth 2 (two) times daily.    denosumab (PROLIA) 60 mg/mL Syrg Inject 1 mL (60 mg total) into the skin every 6 (six) months.    diclofenac sodium (VOLTAREN ARTHRITIS PAIN) 1 % Gel Apply 2 g topically once daily.    dicyclomine (BENTYL) 10 MG capsule     docosahexaenoic acid-epa 120-180 mg Cap Take 2 capsules by mouth.    econazole nitrate 1 % cream Apply topically 2 (two) times daily.    ELIQUIS 2.5 mg Tab Take 2.5 mg by mouth 2 (two) times daily.    escitalopram oxalate (LEXAPRO) 10 MG tablet Take 10 mg by mouth once daily.    febuxostat (ULORIC) 40 mg Tab Take 20 mg by mouth once daily.    fish oil-omega-3 fatty acids 300-1,000 mg capsule Take 2 g by mouth once daily.      furosemide (LASIX) 20 MG tablet Take 20 mg by mouth 2 (two) times daily.      furosemide (LASIX) 40 MG tablet Take 40 mg by mouth 2 (two) times daily.    GAVILYTE-C 240-22.72-6.72 -5.84  gram SolR USE AS DIRECTED    losartan (COZAAR) 100 MG tablet Take 100 mg by mouth once daily.    mv-min-folic acid-lutein 500-250 mcg Chew Take 1 tablet by mouth.    omeprazole (PRILOSEC) 20 MG capsule TAKE 1 CAPSULE BY MOUTH EVERY DAY IN THE MORNING    pravastatin (PRAVACHOL) 40 MG tablet Take 40 mg by mouth once daily.      predniSONE (DELTASONE) 20 MG tablet Take 1 tablet (20 mg total) by mouth once daily.    ranitidine (ZANTAC) 150 MG capsule Take 150 mg by mouth 2 (two) times daily.      ranitidine (ZANTAC) 150 MG tablet Take 150 mg by mouth 2 (two) times daily.    sertraline (ZOLOFT) 50 MG tablet Take 50 mg by mouth once daily.    TRADJENTA 5 mg Tab tablet TAKE ONE(1) TABLET BY MOUTH EVERY DAY    TRESIBA FLEXTOUCH U-100 100 unit/mL (3 mL) InPn SMARTSI Unit(s) SUB-Q Every Morning     Family History       Problem Relation (Age of Onset)    Breast cancer Sister    Colon cancer Sister          Tobacco Use    Smoking status: Former Smoker    Smokeless tobacco: Never Used   Substance and Sexual Activity    Alcohol use: No    Drug use: No    Sexual activity: Never     Review of Systems   Constitutional:  Negative for chills and fever.   HENT:  Negative for nosebleeds and tinnitus.    Eyes:  Negative for photophobia and visual disturbance.   Respiratory:  Positive for shortness of breath. Negative for wheezing.    Cardiovascular:  Positive for chest pain and leg swelling. Negative for palpitations.   Gastrointestinal:  Negative for abdominal distention, nausea and vomiting.   Genitourinary:  Negative for dysuria, flank pain and hematuria.   Musculoskeletal:  Negative for gait problem and joint swelling.   Skin:  Negative for rash and wound.   Neurological:  Negative for seizures and syncope.   Objective:     Vital Signs (Most Recent):  Temp: 98.7 °F (37.1 °C) (08/15/22 1252)  Pulse: 69 (08/15/22 1402)  Resp: 17 (08/15/22 1402)  BP: (!) 141/71 (08/15/22 1402)  SpO2: 95 % (08/15/22 1402)   Vital Signs (24h  Range):  Temp:  [98.7 °F (37.1 °C)] 98.7 °F (37.1 °C)  Pulse:  [69-76] 69  Resp:  [17-18] 17  SpO2:  [95 %-100 %] 95 %  BP: (141-146)/(71-82) 141/71     Weight: 104.8 kg (231 lb)  Body mass index is 39.93 kg/m².    Physical Exam  Vitals and nursing note reviewed.   Constitutional:       General: She is not in acute distress.     Appearance: She is well-developed.   HENT:      Head: Normocephalic and atraumatic.   Eyes:      General:         Right eye: No discharge.         Left eye: No discharge.      Conjunctiva/sclera: Conjunctivae normal.   Neck:      Thyroid: No thyromegaly.   Cardiovascular:      Rate and Rhythm: Normal rate and regular rhythm.      Heart sounds: No murmur heard.  Pulmonary:      Effort: Pulmonary effort is normal. No respiratory distress.      Breath sounds: Normal breath sounds.      Comments: Tenderness to sternum of chest and external rotation of shoulders recreates pain  Chest:      Chest wall: Tenderness present.   Abdominal:      General: Bowel sounds are normal. There is no distension.      Palpations: Abdomen is soft. There is no mass.      Tenderness: There is no abdominal tenderness.   Musculoskeletal:         General: No deformity.      Cervical back: Normal range of motion.      Right lower leg: Edema present.      Left lower leg: Edema present.      Comments: Mild lower extremity to bilateral ankles   Skin:     General: Skin is warm and dry.   Neurological:      Mental Status: She is alert and oriented to person, place, and time.      Sensory: No sensory deficit.   Psychiatric:         Mood and Affect: Mood normal.         Behavior: Behavior normal.           Significant Labs:     CBC  WBC: 5.0  H&H: 12.8&38.6  PLTs: 213    CMP   Sodium: 137  Potassium: 3.9  Chloride: 101  BUN: 19  Cr: 1.3  Glucose: 128  Alk Phos: 68  Almbunin: 3.8  AST/ALT: 33/22  CO2: 29  INR: 1.4  Troponin: 0.04  BNP: 270  Calcium: 9.5  COVID negative  Significant Imaging:   Imaging Results              X-Ray  Chest AP Portable (Final result)  Result time 08/15/22 13:22:24      Final result by Hernandez Mccullough MD (08/15/22 13:22:24)                   Impression:      See above      Electronically signed by: Hernandez Mccullough MD  Date:    08/15/2022  Time:    13:22               Narrative:    EXAMINATION:  XR CHEST AP PORTABLE    CLINICAL HISTORY:  Chest pain, unspecified    TECHNIQUE:  Single frontal view of the chest was performed.    COMPARISON:  No 02/09/2021 ne    FINDINGS:  Pacemaker as before.  Cardiomegaly.  No significant airspace consolidation or pleural effusion identified

## 2022-08-15 NOTE — NURSING
Pt arrived to room 417 AO x4, NAD, oriented to room and call light remote. Instructed to call before getting out of bed. Instructed of NPO status. Denies pain, n/v, or needs at this time. Will monitor. Tele box 0087 verified on monitor.

## 2022-08-16 VITALS
OXYGEN SATURATION: 96 % | DIASTOLIC BLOOD PRESSURE: 57 MMHG | WEIGHT: 232.31 LBS | RESPIRATION RATE: 18 BRPM | TEMPERATURE: 99 F | BODY MASS INDEX: 39.66 KG/M2 | SYSTOLIC BLOOD PRESSURE: 109 MMHG | HEART RATE: 70 BPM | HEIGHT: 64 IN

## 2022-08-16 PROBLEM — R07.9 CHEST PAIN: Status: RESOLVED | Noted: 2022-08-15 | Resolved: 2022-08-16

## 2022-08-16 LAB
ALBUMIN SERPL BCP-MCNC: 3.6 G/DL (ref 3.5–5.2)
ALP SERPL-CCNC: 69 U/L (ref 55–135)
ALT SERPL W/O P-5'-P-CCNC: 14 U/L (ref 10–44)
ANION GAP SERPL CALC-SCNC: 11 MMOL/L (ref 8–16)
ASCENDING AORTA: 3.61 CM
AST SERPL-CCNC: 24 U/L (ref 10–40)
AV INDEX (PROSTH): 0.28
AV MEAN GRADIENT: 7 MMHG
AV PEAK GRADIENT: 11 MMHG
AV VALVE AREA: 1.01 CM2
AV VELOCITY RATIO: 0.4
BASOPHILS # BLD AUTO: 0.03 K/UL (ref 0–0.2)
BASOPHILS NFR BLD: 0.5 % (ref 0–1.9)
BILIRUB SERPL-MCNC: 1 MG/DL (ref 0.1–1)
BSA FOR ECHO PROCEDURE: 2.18 M2
BUN SERPL-MCNC: 18 MG/DL (ref 8–23)
CALCIUM SERPL-MCNC: 9.2 MG/DL (ref 8.7–10.5)
CHLORIDE SERPL-SCNC: 102 MMOL/L (ref 95–110)
CO2 SERPL-SCNC: 23 MMOL/L (ref 23–29)
CREAT SERPL-MCNC: 1.3 MG/DL (ref 0.5–1.4)
CV ECHO LV RWT: 0.5 CM
CV STRESS BASE HR: 75 BPM
DIASTOLIC BLOOD PRESSURE: 76 MMHG
DIFFERENTIAL METHOD: ABNORMAL
DOP CALC AO PEAK VEL: 1.68 M/S
DOP CALC AO VTI: 42.19 CM
DOP CALC LVOT AREA: 3.6 CM2
DOP CALC LVOT DIAMETER: 2.15 CM
DOP CALC LVOT PEAK VEL: 0.67 M/S
DOP CALC LVOT STROKE VOLUME: 42.67 CM3
DOP CALCLVOT PEAK VEL VTI: 11.76 CM
ECHO LV POSTERIOR WALL: 1.2 CM (ref 0.6–1.1)
EJECTION FRACTION: 55 %
EOSINOPHIL # BLD AUTO: 0.2 K/UL (ref 0–0.5)
EOSINOPHIL NFR BLD: 2.9 % (ref 0–8)
ERYTHROCYTE [DISTWIDTH] IN BLOOD BY AUTOMATED COUNT: 14.6 % (ref 11.5–14.5)
EST. GFR  (NO RACE VARIABLE): 41 ML/MIN/1.73 M^2
FRACTIONAL SHORTENING: 27 % (ref 28–44)
GLUCOSE SERPL-MCNC: 122 MG/DL (ref 70–110)
HCT VFR BLD AUTO: 41.3 % (ref 37–48.5)
HGB BLD-MCNC: 13.5 G/DL (ref 12–16)
IMM GRANULOCYTES # BLD AUTO: 0.02 K/UL (ref 0–0.04)
IMM GRANULOCYTES NFR BLD AUTO: 0.3 % (ref 0–0.5)
INTERVENTRICULAR SEPTUM: 1.28 CM (ref 0.6–1.1)
IVRT: 159.17 MSEC
LA MAJOR: 7.36 CM
LA MINOR: 7.2 CM
LA WIDTH: 4.45 CM
LEFT ATRIUM SIZE: 5 CM
LEFT ATRIUM VOLUME INDEX: 66.2 ML/M2
LEFT ATRIUM VOLUME: 137.67 CM3
LEFT INTERNAL DIMENSION IN SYSTOLE: 3.47 CM (ref 2.1–4)
LEFT VENTRICLE DIASTOLIC VOLUME INDEX: 50.66 ML/M2
LEFT VENTRICLE DIASTOLIC VOLUME: 105.37 ML
LEFT VENTRICLE MASS INDEX: 109 G/M2
LEFT VENTRICLE SYSTOLIC VOLUME INDEX: 23.9 ML/M2
LEFT VENTRICLE SYSTOLIC VOLUME: 49.76 ML
LEFT VENTRICULAR INTERNAL DIMENSION IN DIASTOLE: 4.76 CM (ref 3.5–6)
LEFT VENTRICULAR MASS: 226.62 G
LYMPHOCYTES # BLD AUTO: 2.2 K/UL (ref 1–4.8)
LYMPHOCYTES NFR BLD: 37 % (ref 18–48)
MCH RBC QN AUTO: 29.5 PG (ref 27–31)
MCHC RBC AUTO-ENTMCNC: 32.7 G/DL (ref 32–36)
MCV RBC AUTO: 90 FL (ref 82–98)
MONOCYTES # BLD AUTO: 0.4 K/UL (ref 0.3–1)
MONOCYTES NFR BLD: 6.5 % (ref 4–15)
NEUTROPHILS # BLD AUTO: 3.1 K/UL (ref 1.8–7.7)
NEUTROPHILS NFR BLD: 52.8 % (ref 38–73)
NRBC BLD-RTO: 0 /100 WBC
OHS CV CPX 85 PERCENT MAX PREDICTED HEART RATE MALE: 114
OHS CV CPX MAX PREDICTED HEART RATE: 134
OHS CV CPX PATIENT IS FEMALE: 1
OHS CV CPX PATIENT IS MALE: 0
OHS CV CPX PEAK DIASTOLIC BLOOD PRESSURE: 58 MMHG
OHS CV CPX PEAK HEAR RATE: 80 BPM
OHS CV CPX PEAK RATE PRESSURE PRODUCT: NORMAL
OHS CV CPX PEAK SYSTOLIC BLOOD PRESSURE: 136 MMHG
OHS CV CPX PERCENT MAX PREDICTED HEART RATE ACHIEVED: 60
OHS CV CPX RATE PRESSURE PRODUCT PRESENTING: 8925
PISA TR MAX VEL: 2.59 M/S
PLATELET # BLD AUTO: 201 K/UL (ref 150–450)
PMV BLD AUTO: 9.7 FL (ref 9.2–12.9)
POCT GLUCOSE: 104 MG/DL (ref 70–110)
POCT GLUCOSE: 129 MG/DL (ref 70–110)
POCT GLUCOSE: 139 MG/DL (ref 70–110)
POTASSIUM SERPL-SCNC: 3.9 MMOL/L (ref 3.5–5.1)
PROT SERPL-MCNC: 6.6 G/DL (ref 6–8.4)
PV PEAK VELOCITY: 0.9 CM/S
RA MAJOR: 7.81 CM
RA PRESSURE: 8 MMHG
RA WIDTH: 4.54 CM
RBC # BLD AUTO: 4.58 M/UL (ref 4–5.4)
RIGHT VENTRICULAR END-DIASTOLIC DIMENSION: 3.68 CM
RV TISSUE DOPPLER FREE WALL SYSTOLIC VELOCITY 1 (APICAL 4 CHAMBER VIEW): 10.82 CM/S
SINUS: 3.02 CM
SODIUM SERPL-SCNC: 136 MMOL/L (ref 136–145)
STJ: 2.64 CM
SYSTOLIC BLOOD PRESSURE: 119 MMHG
TDI LATERAL: 0.05 M/S
TDI SEPTAL: 0.04 M/S
TDI: 0.05 M/S
TR MAX PG: 27 MMHG
TRICUSPID ANNULAR PLANE SYSTOLIC EXCURSION: 1.26 CM
TROPONIN I SERPL DL<=0.01 NG/ML-MCNC: 0.04 NG/ML (ref 0–0.03)
TV REST PULMONARY ARTERY PRESSURE: 35 MMHG
WBC # BLD AUTO: 5.84 K/UL (ref 3.9–12.7)

## 2022-08-16 PROCEDURE — 80053 COMPREHEN METABOLIC PANEL: CPT | Performed by: PHYSICIAN ASSISTANT

## 2022-08-16 PROCEDURE — 85025 COMPLETE CBC W/AUTO DIFF WBC: CPT | Performed by: PHYSICIAN ASSISTANT

## 2022-08-16 PROCEDURE — 84484 ASSAY OF TROPONIN QUANT: CPT | Performed by: PHYSICIAN ASSISTANT

## 2022-08-16 PROCEDURE — 36415 COLL VENOUS BLD VENIPUNCTURE: CPT | Performed by: PHYSICIAN ASSISTANT

## 2022-08-16 PROCEDURE — G0378 HOSPITAL OBSERVATION PER HR: HCPCS

## 2022-08-16 PROCEDURE — 99220 PR INITIAL OBSERVATION CARE,LEVL III: ICD-10-PCS | Mod: 25,,, | Performed by: INTERNAL MEDICINE

## 2022-08-16 PROCEDURE — 99220 PR INITIAL OBSERVATION CARE,LEVL III: CPT | Mod: 25,,, | Performed by: INTERNAL MEDICINE

## 2022-08-16 PROCEDURE — 25000003 PHARM REV CODE 250: Performed by: PHYSICIAN ASSISTANT

## 2022-08-16 RX ORDER — NITROGLYCERIN 0.4 MG/1
0.4 TABLET SUBLINGUAL EVERY 5 MIN PRN
Qty: 30 TABLET | Refills: 1 | Status: SHIPPED | OUTPATIENT
Start: 2022-08-16 | End: 2023-05-03

## 2022-08-16 RX ORDER — REGADENOSON 0.08 MG/ML
0.4 INJECTION, SOLUTION INTRAVENOUS ONCE
Status: DISCONTINUED | OUTPATIENT
Start: 2022-08-16 | End: 2022-08-16 | Stop reason: HOSPADM

## 2022-08-16 RX ADMIN — FUROSEMIDE 40 MG: 40 TABLET ORAL at 08:08

## 2022-08-16 RX ADMIN — FUROSEMIDE 40 MG: 40 TABLET ORAL at 05:08

## 2022-08-16 RX ADMIN — LOSARTAN POTASSIUM 100 MG: 25 TABLET, FILM COATED ORAL at 08:08

## 2022-08-16 NOTE — ASSESSMENT & PLAN NOTE
Per chart review history of non-ischemic cardiomyopathy. EKG without ST elevation, troponin negative, chest x-ray without consolidation or pleural effusion  Troponin trend  Telemetry  Echo, showed EF 50-55%, mild concentric hypertrophy.  -equivocal below NM stress test with mild to moderate intensity, large size, equivocal wall perfusion abnormality that is fixed to the distal inferolateral and lateral wall.  Patient was offered left heart catheterization to further evaluate.  Patient declined several times.  cardiology consulted, input appreciated.  Continue home   Follow-up with outpatient Cardiology Clinic, her regular cardiologist Dr. Jelani Diaz in 1 week or as scheduled.

## 2022-08-16 NOTE — NURSING
AVS and prescriptions given and discussed with patient, including home medications, new prescriptions, follow up appts and home care. Pt verbalized understanding of all instructions. IV removed, tip intact, pressure dressing applied. Escorted to ER parking area by transport for family/friend . Face mask intact. No distress noted

## 2022-08-16 NOTE — PLAN OF CARE
08/16/22 0911   Discharge Planning   Assessment Type Discharge Planning Brief Assessment   Resource/Environmental Concerns none   Support Systems Children   Equipment Currently Used at Home rollator;shower chair   Current Living Arrangements home/apartment/condo   Patient/Family Anticipates Transition to home   Patient/Family Anticipated Services at Transition none   DME Needed Upon Discharge  none   Discharge Plan A Home with family   Discharge Plan B Home

## 2022-08-16 NOTE — SUBJECTIVE & OBJECTIVE
Past Medical History:   Diagnosis Date    *Atrial fibrillation     Allergy     Atrial fibrillation     Controlled gout     Diabetes mellitus type II     Hypercholesteremia     Hyperlipidemia     Hypertension     Osteoporosis, senile        Past Surgical History:   Procedure Laterality Date    CARDIAC DEFIBRILLATOR PLACEMENT      OOPHORECTOMY      TUBAL LIGATION         Review of patient's allergies indicates:   Allergen Reactions    Beta-adrenergic agents     Ciprofloxacin Other (See Comments)    Clindamycin      rash    Metformin     Penicillins     Verapamil     Zyloprim [allopurinol]        No current facility-administered medications on file prior to encounter.     Current Outpatient Medications on File Prior to Encounter   Medication Sig    acetaminophen (TYLENOL) 500 MG tablet Take 1 tablet (500 mg total) by mouth every 4 (four) hours as needed for Pain.    albuterol (PROVENTIL/VENTOLIN HFA) 90 mcg/actuation inhaler Inhale 2 puffs into the lungs.    denosumab (PROLIA) 60 mg/mL Syrg Inject 1 mL (60 mg total) into the skin every 6 (six) months.    diclofenac sodium (VOLTAREN ARTHRITIS PAIN) 1 % Gel Apply 2 g topically once daily.    dicyclomine (BENTYL) 10 MG capsule     docosahexaenoic acid-epa 120-180 mg Cap Take 2 capsules by mouth.    econazole nitrate 1 % cream Apply topically 2 (two) times daily.    ELIQUIS 2.5 mg Tab Take 2.5 mg by mouth 2 (two) times daily.    fish oil-omega-3 fatty acids 300-1,000 mg capsule Take 2 g by mouth once daily.      furosemide (LASIX) 40 MG tablet Take 40 mg by mouth 2 (two) times daily.    GAVILYTE-C 240-22.72-6.72 -5.84 gram SolR USE AS DIRECTED    losartan (COZAAR) 100 MG tablet Take 100 mg by mouth once daily.    mv-min-folic acid-lutein 500-250 mcg Chew Take 1 tablet by mouth.    pravastatin (PRAVACHOL) 40 MG tablet Take 40 mg by mouth once daily.      predniSONE (DELTASONE) 20 MG tablet Take 1 tablet (20 mg total) by mouth once daily.    TRADJENTA 5 mg Tab tablet TAKE  ONE(1) TABLET BY MOUTH EVERY DAY    TRESIBA FLEXTOUCH U-100 100 unit/mL (3 mL) InPn SMARTSI Unit(s) SUB-Q Every Morning    [DISCONTINUED] amiodarone (PACERONE) 200 MG Tab Take by mouth once daily.    [DISCONTINUED] captopriL (CAPOTEN) 25 MG tablet Take 25 mg by mouth 2 (two) times daily.    [DISCONTINUED] escitalopram oxalate (LEXAPRO) 10 MG tablet Take 10 mg by mouth once daily.    [DISCONTINUED] omeprazole (PRILOSEC) 20 MG capsule TAKE 1 CAPSULE BY MOUTH EVERY DAY IN THE MORNING    [DISCONTINUED] sertraline (ZOLOFT) 50 MG tablet Take 50 mg by mouth once daily.     Family History       Problem Relation (Age of Onset)    Breast cancer Sister    Colon cancer Sister          Tobacco Use    Smoking status: Former Smoker    Smokeless tobacco: Never Used   Substance and Sexual Activity    Alcohol use: No    Drug use: No    Sexual activity: Never     Review of Systems   Constitutional: Negative for chills, diaphoresis, fever and malaise/fatigue.   HENT:  Negative for nosebleeds.    Eyes:  Negative for blurred vision and double vision.   Cardiovascular:  Negative for chest pain, claudication, cyanosis, dyspnea on exertion, leg swelling, orthopnea, palpitations, paroxysmal nocturnal dyspnea and syncope.   Respiratory:  Negative for cough, shortness of breath and wheezing.    Skin:  Negative for dry skin and poor wound healing.   Musculoskeletal:  Negative for back pain, joint swelling and myalgias.   Gastrointestinal:  Negative for abdominal pain, nausea and vomiting.   Genitourinary:  Negative for hematuria.   Neurological:  Negative for dizziness, headaches, numbness, seizures and weakness.   Psychiatric/Behavioral:  Negative for altered mental status and depression.    Objective:     Vital Signs (Most Recent):  Temp: 97.7 °F (36.5 °C) (22 110)  Pulse: 72 (22 1101)  Resp: 18 (22 110)  BP: (!) 141/71 (22 110)  SpO2: 96 % (22 110)   Vital Signs (24h Range):  Temp:  [97.6 °F (36.4  °C)-98.7 °F (37.1 °C)] 97.7 °F (36.5 °C)  Pulse:  [69-76] 72  Resp:  [17-20] 18  SpO2:  [95 %-100 %] 96 %  BP: (122-157)/(58-87) 141/71     Weight: 105.4 kg (232 lb 4.8 oz)  Body mass index is 39.87 kg/m².    SpO2: 96 %  O2 Device (Oxygen Therapy): room air      Intake/Output Summary (Last 24 hours) at 8/16/2022 1235  Last data filed at 8/16/2022 0700  Gross per 24 hour   Intake 180 ml   Output --   Net 180 ml       Lines/Drains/Airways       Peripheral Intravenous Line  Duration                  Peripheral IV - Single Lumen 08/15/22 1252 20 G Right Forearm <1 day         Peripheral IV - Single Lumen 08/15/22 1258 20 G Left Antecubital <1 day                    Physical Exam  Constitutional:       General: She is not in acute distress.     Appearance: She is well-developed. She is obese. She is not ill-appearing, toxic-appearing or diaphoretic.   HENT:      Head: Normocephalic and atraumatic.   Eyes:      General: No scleral icterus.     Extraocular Movements: Extraocular movements intact.      Conjunctiva/sclera: Conjunctivae normal.      Pupils: Pupils are equal, round, and reactive to light.   Neck:      Vascular: No JVD.      Trachea: No tracheal deviation.   Cardiovascular:      Rate and Rhythm: Normal rate and regular rhythm.      Heart sounds: S1 normal and S2 normal. Heart sounds are distant. No murmur heard.    No friction rub. No gallop.   Pulmonary:      Effort: Pulmonary effort is normal. No respiratory distress.      Breath sounds: Normal breath sounds. No stridor. No wheezing, rhonchi or rales.   Chest:      Chest wall: No tenderness.   Abdominal:      General: There is no distension.      Palpations: Abdomen is soft.   Musculoskeletal:         General: No tenderness. Normal range of motion.      Cervical back: Normal range of motion and neck supple. No rigidity.   Skin:     General: Skin is warm and dry.      Coloration: Skin is not jaundiced.   Neurological:      General: No focal deficit present.       Mental Status: She is alert and oriented to person, place, and time.      Cranial Nerves: No cranial nerve deficit.   Psychiatric:         Mood and Affect: Mood normal.         Behavior: Behavior normal.       Current Medications:   apixaban  2.5 mg Oral BID    furosemide  40 mg Oral BID    LIDOcaine  1 patch Transdermal Q24H    losartan  100 mg Oral Daily    pravastatin  40 mg Oral Daily       acetaminophen, glucagon (human recombinant), glucose, glucose, insulin aspart U-100, magnesium oxide, magnesium oxide, melatonin, naloxone, senna-docusate 8.6-50 mg, sodium chloride 0.9%    Laboratory (all labs reviewed):  CBC:  Recent Labs   Lab 08/16/22  0441   WBC 5.84   Hemoglobin 13.5   Hematocrit 41.3   Platelets 201       CHEMISTRIES:  Recent Labs   Lab 08/16/22  0441   Glucose 122 H   Sodium 136   Potassium 3.9   BUN 18   Creatinine 1.3   Calcium 9.2       CARDIAC BIOMARKERS:  Recent Labs   Lab 08/15/22  1529 08/15/22  1732 08/16/22  0004   Troponin I 0.044 H 0.040 H 0.037 H       COAGS:        LIPIDS/LFTS:  Recent Labs   Lab 08/16/22  0441   AST 24   ALT 14       BNP:        TSH:        Free T4:        Diagnostic Results:  ECG (personally reviewed and interpreted tracing(s)):  8/15/22 ?AF BiVP 81, PVCs    Chest X-Ray (personally reviewed and interpreted image(s)): 8/15/22 CMeg, BiV ICD L chest    Echo: 8/16/22 (images personally reviewed and interpreted)  The left ventricle is normal in size with mild concentric hypertrophy and normal systolic function.  The estimated ejection fraction is 50-55%.  Cannot exclude basal posterior WMA.  Normal right ventricular size with normal right ventricular systolic function.  There is mild-to-moderate aortic valve stenosis.  Aortic valve area is 1.01 cm2; peak velocity is 1.68 m/s; mean gradient is 7 mmHg.  Mild tricuspid regurgitation.  The estimated PA systolic pressure is 35 mmHg.    L MPI 8/16/22 (images personally reviewed and interpreted)    Equivocal myocardial perfusion  scan.    There is a mild to moderate intensity, large sized, equivocal perfusion abnormality that is fixed in the basal to distal inferolateral and lateral wall(s). This finding is equivocal due to soft tissue shadow.    There are no other significant perfusion abnormalities.    The LVEF is not accurate due to poor software boundary tracking during stress. The visually estimated ejection fraction is low-normal during stress.    Septal motion c/w paced rhythm.    A PET stress exam is recommended if clinically indicated.

## 2022-08-16 NOTE — PLAN OF CARE
Problem: Adult Inpatient Plan of Care  Goal: Plan of Care Review  Outcome: Met  Goal: Patient-Specific Goal (Individualized)  Outcome: Met  Goal: Absence of Hospital-Acquired Illness or Injury  Outcome: Met  Goal: Optimal Comfort and Wellbeing  Outcome: Met  Goal: Readiness for Transition of Care  Outcome: Met     Problem: Diabetes Comorbidity  Goal: Blood Glucose Level Within Targeted Range  Outcome: Met     Problem: Fall Injury Risk  Goal: Absence of Fall and Fall-Related Injury  Outcome: Met     Problem: Chest Pain  Goal: Resolution of Chest Pain Symptoms  Outcome: Met

## 2022-08-16 NOTE — CONSULTS
Patient from home and lives with her daughter. Spoke with pt's son regarding discharge planning. Pt's son stated he will be the pt's help at home as well as the patient's daughter, Mita. Pt has a rollator and shower chair at home. [place] to continue to follow for discharge needs.

## 2022-08-16 NOTE — HPI
"82 y.o. female A-fib, DM, HLD, HTN, CKD, presents to the hospital with a chief complaint of chest pain that she describes as "Chest tightness". Pt stated that her chest pain started at 10 am this morning she rated it as severe and that it felt like a crushing pain. Pt states that she still feels like her chest is tight and with associated SOB. Pt did not try any alieviating interventions and it worsens with movement of her shoulders. She attempted treatment of SOB with home inhaler without improvement. She denies nausea, vomiting, abdominal pain, back pain, radiating arm pain, syncope, dizziness, diaphoresis, dysuria, melena, hematemesis. She admits to eating spicy foods over the weekend and noticed this morning her ankles were swollen.   In the ED chest x-ray showed Cardiomegaly. No significant airspace consolidation or pleural effusion identified, troponin negative, COVID negative, INR 1.4, Cr 1.3, EKG without ST elevation.     Follows with Dr. Diaz for NICM (MPI 2017) last seen in 3/2022.    Cardiology consulted for CP.    Her description (as above) also includes "gas" with associated chest discomfort which improved with belching.  EKG notes bivent pacing.  Echo with normal global LV fxn and possible basal inferior WMA.  MPI equivocal, but no ischemia noted.  Findings d/w pt.  I offered cath for further investigation, but pt prefers med rx.  "

## 2022-08-16 NOTE — HOSPITAL COURSE
The patient was admitted to the hospital for chest pain evaluation.  Patient had chest x-ray, EKG, troponin with no acute changes.  Patient was seen in consultation by Cardiology Services, input appreciated.  Patient had echo done that showed EF 50-55%, mild concentric LVH.  Patient had nuclear stress test done that showed equivocal myocardial perfusion scan with mild-to-moderate intensity large size perfusion abnormality that is fixed in the basal to distal inferolateral and lateral wall.  Patient was recommended to consider left heart catheterization to further evaluate.  Patient despite several discussions to consider left heart catheterization, nicely declined.  Patient wishes were respected.  Patient was encouraged to follow up with PCP 1 week.  Follow-up with her regular cardiology clinic Dr. Jelani Diaz in 1 week or as scheduled.  The patient if she develops chest pain, shortness of breath, nausea, vomiting, or abdominal pain type symptoms she should go to the local ER.  She verbalized understanding.  The patient says I have lived my life, when the Lord calls me I am ready to go.

## 2022-08-16 NOTE — NURSING
"medtronic interrogation device completed    ETA: per Rep, interrogation is "normal" Will send a fax report over to [phone] ([facility])  "

## 2022-08-16 NOTE — ASSESSMENT & PLAN NOTE
Concerning sxs with minimal flat trop elev.  Echo with preserved EF and ?basal inferior WMA.  MPI with fixed lateral defect (?attenuation).  No ischemia.  Pt declines cath for further investigation.  Cont med rx  Consider cardiac PET vs cath if recurrent sxs.

## 2022-08-16 NOTE — PLAN OF CARE
[facility] - Med Surg  Discharge Final Note    Patient clear to discharge from case management stand point. Pt to keep previously scheduled cardiology appointment. Pt's son will be her help home at discharge.   Primary Care Provider: Marianne Padilla MD    Expected Discharge Date: 8/16/2022    Final Discharge Note (most recent)     Final Note - 08/16/22 1645        Final Note    Assessment Type Final Discharge Note     Anticipated Discharge Disposition Home or Self Care     What phone number can be called within the next 1-3 days to see how you are doing after discharge? [phone]     Hospital Resources/Appts/Education Provided Provided patient/caregiver with written discharge plan information;Appointments scheduled and added to AVS        Post-Acute Status    Discharge Delays None known at this time                 Important Message from Medicare             Contact Info     Marianne Padilla MD   Specialty: Internal Medicine   Relationship: PCP - General    [address] [place] [address]  [id number]  [place] [address]   Phone: [phone]       Next Steps: Schedule an appointment as soon as possible for a visit in 1 week(s)    Jelani Mendoza MD   Specialty: Cardiology    [address] [place], [place] [address]  [id number]  [facility] [place] [address]   Phone: [phone]       Next Steps: Schedule an appointment as soon as possible for a visit on 8/31/2022    Instructions: Keep previously scheduled appointment @1:30pm

## 2022-08-16 NOTE — CONSULTS
"[facility]  Cardiology  Consult Note    Patient Name: Nyasia Frye  MRN: [id number]  Admission Date: 8/15/2022  Hospital Length of Stay: 0 days  Code Status: Full Code   Attending Provider: Jairo Kinney MD   Consulting Provider: Gil Escalante MD  Primary Care Physician: Marianne Padilla MD  Principal Problem:Chest pain    Patient information was obtained from patient and ER records.     Inpatient consult to Cardiology  Consult performed by: Gil Escalante MD  Consult ordered by: Jairo Kinney MD  Reason for consult: CP        Subjective:     Chief Complaint:  CP     HPI:   82 y.o. female A-fib, DM, HLD, HTN, CKD, presents to the hospital with a chief complaint of chest pain that she describes as "Chest tightness". Pt stated that her chest pain started at 10 am this morning she rated it as severe and that it felt like a crushing pain. Pt states that she still feels like her chest is tight and with associated SOB. Pt did not try any alieviating interventions and it worsens with movement of her shoulders. She attempted treatment of SOB with home inhaler without improvement. She denies nausea, vomiting, abdominal pain, back pain, radiating arm pain, syncope, dizziness, diaphoresis, dysuria, melena, hematemesis. She admits to eating spicy foods over the weekend and noticed this morning her ankles were swollen.   In the ED chest x-ray showed Cardiomegaly. No significant airspace consolidation or pleural effusion identified, troponin negative, COVID negative, INR 1.4, Cr 1.3, EKG without ST elevation.     Follows with Dr. Diaz for NICM (MPI 2017) last seen in 3/2022.    Cardiology consulted for CP.    Her description (as above) also includes "gas" with associated chest discomfort which improved with belching.  EKG notes bivent pacing.  Echo with normal global LV fxn and possible basal inferior WMA.  MPI equivocal, but no ischemia noted.  Findings d/w pt.  I offered cath for further investigation, " but pt prefers med rx.      Past Medical History:   Diagnosis Date    *Atrial fibrillation     Allergy     Atrial fibrillation     Controlled gout     Diabetes mellitus type II     Hypercholesteremia     Hyperlipidemia     Hypertension     Osteoporosis, senile        Past Surgical History:   Procedure Laterality Date    CARDIAC DEFIBRILLATOR PLACEMENT      OOPHORECTOMY      TUBAL LIGATION         Review of patient's allergies indicates:   Allergen Reactions    Beta-adrenergic agents     Ciprofloxacin Other (See Comments)    Clindamycin      rash    Metformin     Penicillins     Verapamil     Zyloprim [allopurinol]        No current facility-administered medications on file prior to encounter.     Current Outpatient Medications on File Prior to Encounter   Medication Sig    acetaminophen (TYLENOL) 500 MG tablet Take 1 tablet (500 mg total) by mouth every 4 (four) hours as needed for Pain.    albuterol (PROVENTIL/VENTOLIN HFA) 90 mcg/actuation inhaler Inhale 2 puffs into the lungs.    denosumab (PROLIA) 60 mg/mL Syrg Inject 1 mL (60 mg total) into the skin every 6 (six) months.    diclofenac sodium (VOLTAREN ARTHRITIS PAIN) 1 % Gel Apply 2 g topically once daily.    dicyclomine (BENTYL) 10 MG capsule     docosahexaenoic acid-epa 120-180 mg Cap Take 2 capsules by mouth.    econazole nitrate 1 % cream Apply topically 2 (two) times daily.    ELIQUIS 2.5 mg Tab Take 2.5 mg by mouth 2 (two) times daily.    fish oil-omega-3 fatty acids 300-1,000 mg capsule Take 2 g by mouth once daily.      furosemide (LASIX) 40 MG tablet Take 40 mg by mouth 2 (two) times daily.    GAVILYTE-C 240-22.72-6.72 -5.84 gram SolR USE AS DIRECTED    losartan (COZAAR) 100 MG tablet Take 100 mg by mouth once daily.    mv-min-folic acid-lutein 500-250 mcg Chew Take 1 tablet by mouth.    pravastatin (PRAVACHOL) 40 MG tablet Take 40 mg by mouth once daily.      predniSONE (DELTASONE) 20 MG tablet Take 1 tablet (20 mg  total) by mouth once daily.    TRADJENTA 5 mg Tab tablet TAKE ONE(1) TABLET BY MOUTH EVERY DAY    TRESIBA FLEXTOUCH U-100 100 unit/mL (3 mL) InPn SMARTSI Unit(s) SUB-Q Every Morning    [DISCONTINUED] amiodarone (PACERONE) 200 MG Tab Take by mouth once daily.    [DISCONTINUED] captopriL (CAPOTEN) 25 MG tablet Take 25 mg by mouth 2 (two) times daily.    [DISCONTINUED] escitalopram oxalate (LEXAPRO) 10 MG tablet Take 10 mg by mouth once daily.    [DISCONTINUED] omeprazole (PRILOSEC) 20 MG capsule TAKE 1 CAPSULE BY MOUTH EVERY DAY IN THE MORNING    [DISCONTINUED] sertraline (ZOLOFT) 50 MG tablet Take 50 mg by mouth once daily.     Family History       Problem Relation (Age of Onset)    Breast cancer Sister    Colon cancer Sister          Tobacco Use    Smoking status: Former Smoker    Smokeless tobacco: Never Used   Substance and Sexual Activity    Alcohol use: No    Drug use: No    Sexual activity: Never     Review of Systems   Constitutional: Negative for chills, diaphoresis, fever and malaise/fatigue.   HENT:  Negative for nosebleeds.    Eyes:  Negative for blurred vision and double vision.   Cardiovascular:  Negative for chest pain, claudication, cyanosis, dyspnea on exertion, leg swelling, orthopnea, palpitations, paroxysmal nocturnal dyspnea and syncope.   Respiratory:  Negative for cough, shortness of breath and wheezing.    Skin:  Negative for dry skin and poor wound healing.   Musculoskeletal:  Negative for back pain, joint swelling and myalgias.   Gastrointestinal:  Negative for abdominal pain, nausea and vomiting.   Genitourinary:  Negative for hematuria.   Neurological:  Negative for dizziness, headaches, numbness, seizures and weakness.   Psychiatric/Behavioral:  Negative for altered mental status and depression.    Objective:     Vital Signs (Most Recent):  Temp: 97.7 °F (36.5 °C) (22 110)  Pulse: 72 (22 110)  Resp: 18 (22)  BP: (!) 141/71 (22)  SpO2: 96 %  (08/16/22 1101)   Vital Signs (24h Range):  Temp:  [97.6 °F (36.4 °C)-98.7 °F (37.1 °C)] 97.7 °F (36.5 °C)  Pulse:  [69-76] 72  Resp:  [17-20] 18  SpO2:  [95 %-100 %] 96 %  BP: (122-157)/(58-87) 141/71     Weight: 105.4 kg (232 lb 4.8 oz)  Body mass index is 39.87 kg/m².    SpO2: 96 %  O2 Device (Oxygen Therapy): room air      Intake/Output Summary (Last 24 hours) at 8/16/2022 1235  Last data filed at 8/16/2022 0700  Gross per 24 hour   Intake 180 ml   Output --   Net 180 ml       Lines/Drains/Airways       Peripheral Intravenous Line  Duration                  Peripheral IV - Single Lumen 08/15/22 1252 20 G Right Forearm <1 day         Peripheral IV - Single Lumen 08/15/22 1258 20 G Left Antecubital <1 day                    Physical Exam  Constitutional:       General: She is not in acute distress.     Appearance: She is well-developed. She is obese. She is not ill-appearing, toxic-appearing or diaphoretic.   HENT:      Head: Normocephalic and atraumatic.   Eyes:      General: No scleral icterus.     Extraocular Movements: Extraocular movements intact.      Conjunctiva/sclera: Conjunctivae normal.      Pupils: Pupils are equal, round, and reactive to light.   Neck:      Vascular: No JVD.      Trachea: No tracheal deviation.   Cardiovascular:      Rate and Rhythm: Normal rate and regular rhythm.      Heart sounds: S1 normal and S2 normal. Heart sounds are distant. No murmur heard.    No friction rub. No gallop.   Pulmonary:      Effort: Pulmonary effort is normal. No respiratory distress.      Breath sounds: Normal breath sounds. No stridor. No wheezing, rhonchi or rales.   Chest:      Chest wall: No tenderness.   Abdominal:      General: There is no distension.      Palpations: Abdomen is soft.   Musculoskeletal:         General: No tenderness. Normal range of motion.      Cervical back: Normal range of motion and neck supple. No rigidity.   Skin:     General: Skin is warm and dry.      Coloration: Skin is not  jaundiced.   Neurological:      General: No focal deficit present.      Mental Status: She is alert and oriented to person, place, and time.      Cranial Nerves: No cranial nerve deficit.   Psychiatric:         Mood and Affect: Mood normal.         Behavior: Behavior normal.       Current Medications:   apixaban  2.5 mg Oral BID    furosemide  40 mg Oral BID    LIDOcaine  1 patch Transdermal Q24H    losartan  100 mg Oral Daily    pravastatin  40 mg Oral Daily       acetaminophen, glucagon (human recombinant), glucose, glucose, insulin aspart U-100, magnesium oxide, magnesium oxide, melatonin, naloxone, senna-docusate 8.6-50 mg, sodium chloride 0.9%    Laboratory (all labs reviewed):  CBC:  Recent Labs   Lab 08/16/22  0441   WBC 5.84   Hemoglobin 13.5   Hematocrit 41.3   Platelets 201       CHEMISTRIES:  Recent Labs   Lab 08/16/22  0441   Glucose 122 H   Sodium 136   Potassium 3.9   BUN 18   Creatinine 1.3   Calcium 9.2       CARDIAC BIOMARKERS:  Recent Labs   Lab 08/15/22  1529 08/15/22  1732 08/16/22  0004   Troponin I 0.044 H 0.040 H 0.037 H       COAGS:        LIPIDS/LFTS:  Recent Labs   Lab 08/16/22  0441   AST 24   ALT 14       BNP:        TSH:        Free T4:        Diagnostic Results:  ECG (personally reviewed and interpreted tracing(s)):  8/15/22 ?AF BiVP 81, PVCs    Chest X-Ray (personally reviewed and interpreted image(s)): 8/15/22 CMeg, BiV ICD L chest    Echo: 8/16/22 (images personally reviewed and interpreted)  · The left ventricle is normal in size with mild concentric hypertrophy and normal systolic function.  · The estimated ejection fraction is 50-55%.  · Cannot exclude basal posterior WMA.  · Normal right ventricular size with normal right ventricular systolic function.  · There is mild-to-moderate aortic valve stenosis.  · Aortic valve area is 1.01 cm2; peak velocity is 1.68 m/s; mean gradient is 7 mmHg.  · Mild tricuspid regurgitation.  · The estimated PA systolic pressure is 35 mmHg.    L  MPI 8/16/22 (images personally reviewed and interpreted)    Equivocal myocardial perfusion scan.    There is a mild to moderate intensity, large sized, equivocal perfusion abnormality that is fixed in the basal to distal inferolateral and lateral wall(s). This finding is equivocal due to soft tissue shadow.    There are no other significant perfusion abnormalities.    The LVEF is not accurate due to poor software boundary tracking during stress. The visually estimated ejection fraction is low-normal during stress.    Septal motion c/w paced rhythm.    A PET stress exam is recommended if clinically indicated.        Assessment and Plan:     * Chest pain  Concerning sxs with minimal flat trop elev.  Echo with preserved EF and ?basal inferior WMA.  MPI with fixed lateral defect (?attenuation).  No ischemia.  Pt declines cath for further investigation.  Cont med rx  Consider cardiac PET vs cath if recurrent sxs.    Diabetes mellitus, type 2  Per IM    Hypertension  Cont med rx    Atrial fibrillation  HR controled  Cont OAC (would change to 5mg bid eliquis dosing)    Hyperlipidemia  Cont statin    ICD (implantable cardioverter-defibrillator) in place  Appears to functioning normally.    Nonischemic cardiomyopathy  LVEF normal on echo        VTE Risk Mitigation (From admission, onward)         Ordered     apixaban tablet 2.5 mg  2 times daily         08/15/22 1748     IP VTE HIGH RISK PATIENT  Once         08/15/22 1521     Place sequential compression device  Until discontinued         08/15/22 1521     Place ANT hose  Until discontinued         08/15/22 1521              Pt to follow up with Dr. Diaz.    Thank you for your consult. I will sign off. Please contact us if you have any additional questions.    Gil Escalante MD  Cardiology   [facility] [place] [facility]

## 2022-08-16 NOTE — NURSING
Pt out of bed up in chair with eyes closed. No distress noted. Scheduled medication given. IV saline lock. No complaints. Safety measures maintained. Will cont to monitor

## 2022-08-16 NOTE — ASSESSMENT & PLAN NOTE
Stress test 2017 with EF of 25% and without ischemia. Followed by cardiology outpatient.   Continue home losartan  Will give 20mg extra lasix tonight given mild lower extremity edema  Daily weights/strict intake and output  Repeat echo showed patient to have EF 50-55%, with mild concentric hypertrophy.  -patient had equivocal in and stress tests with mild to moderate intensity, large size, equivocal wall perfusion abnormality that is fixed to the distal inferolateral and lateral wall.  Patient was offered left heart catheterization to further evaluate.  Patient declined several times.  -patient will follow-up with outpatient regular cardiologist Dr. Jelani Diaz 1 week or as scheduled.

## 2022-08-16 NOTE — ASSESSMENT & PLAN NOTE
Body mass index is 39.87 kg/m². Morbid obesity complicates all aspects of disease management from diagnostic modalities to treatment. Weight loss encouraged and health benefits explained to patient.

## 2022-08-16 NOTE — NURSING
"[employer] contacted for interrogation. Will be sending rep.      ETA: rep called back to  house supervisor has access to "carelink" in the ER, an ipad device that's placed over the device and sends information to [employer]. Quincy Corley Sup notified.  "

## 2022-08-16 NOTE — DISCHARGE SUMMARY
"[facility] Medicine  Discharge Summary      Patient Name: Nyasia Frye  MRN: [id number]  Patient Class: OP- Observation  Admission Date: 8/15/2022  Hospital Length of Stay: 0 days  Discharge Date and Time:  08/16/2022 3:31 PM  Attending Physician: Jairo Kinney MD   Discharging Provider: Jairo Kinney MD  Primary Care Provider: Marianne Padilla MD      HPI:   Nyasia Frye 82 y.o. female A-fib, DM, HLD, HTN, CKD, presents to the hospital with a chief complaint of chest pain that she describes as "Chest tightness". Pt stated that her chest pain started at 10 am this morning she rated it as severe and that it felt like a crushing pain. Pt states that she still feels like her chest is tight and with associated SOB. Pt did not try any alieviating interventions and it worsens with movement of her shoulders. She attempted treatment of SOB with home inhaler without improvement. She denies nausea, vomiting, abdominal pain, back pain, radiating arm pain, syncope, dizziness, diaphoresis, dysuria, melena, hematemesis. She admits to eating spicy foods over the weekend and noticed this morning her ankles were swollen.     In the ED chest x-ray showed Cardiomegaly. No significant airspace consolidation or pleural effusion identified, troponin negative, COVID negative, INR 1.4, Cr 1.3, EKG without ST elevation.       * No surgery found *      Hospital Course:   The patient was admitted to the hospital for chest pain evaluation.  Patient had chest x-ray, EKG, troponin with no acute changes.  Patient was seen in consultation by Cardiology Services, input appreciated.  Patient had echo done that showed EF 50-55%, mild concentric LVH.  Patient had nuclear stress test done that showed equivocal myocardial perfusion scan with mild-to-moderate intensity large size perfusion abnormality that is fixed in the basal to distal inferolateral and lateral wall.  Patient was recommended to consider left heart catheterization to " further evaluate.  Patient despite several discussions to consider left heart catheterization, nicely declined.  Patient wishes were respected.  Patient was encouraged to follow up with PCP 1 week.  Follow-up with her regular cardiology clinic Dr. Jelani Diaz in 1 week or as scheduled.  The patient if she develops chest pain, shortness of breath, nausea, vomiting, or abdominal pain type symptoms she should go to the local ER.  She verbalized understanding.  The patient says I have lived my life, when the Lord calls me I am ready to go.       Goals of Care Treatment Preferences:  Code Status: Full Code      Consults:   Consults (From admission, onward)        Status Ordering Provider     Inpatient consult to Cardiology  Once        Provider:  (Not yet assigned)    Completed JENNIFER SIM     Case Management/  Once        Provider:  (Not yet assigned)    Completed ARLINE FORRESTER new Assessment & Plan notes have been filed under this hospital service since the last note was generated.  Service: Hospital Medicine    Final Active Diagnoses:    Diagnosis Date Noted POA    Morbid obesity [E66.01] 02/09/2021 Yes     Chronic    Nonischemic cardiomyopathy [I42.8] 02/09/2021 Yes     Chronic    CKD (chronic kidney disease), stage III [N18.30] 11/13/2020 Yes     Chronic    ICD (implantable cardioverter-defibrillator) in place [Z95.810] 11/12/2020 Yes     Chronic    Hypertension [I10] 03/01/2013 Yes     Chronic    Hyperlipidemia [E78.5] 03/01/2013 Yes     Chronic    Diabetes mellitus, type 2 [E11.9] 03/01/2013 Yes     Chronic    Atrial fibrillation [I48.91] 03/01/2013 Yes     Chronic      Problems Resolved During this Admission:    Diagnosis Date Noted Date Resolved POA    PRINCIPAL PROBLEM:  Chest pain [R07.9] 08/15/2022 08/16/2022 Yes       Discharged Condition: fair    Disposition: Home or Self Care    Follow Up:   Follow-up Information     Marianne Padilla MD Follow up in 1 week(s).     Specialty: Internal Medicine  Contact information:  [address] [place] [address] [address]  [id number]  [place] [address]  [phone]             Dr. Jelani Diaz MD Follow up in 1 week(s).    Contact information:  Reyna Espino  [facility] [address] [phone]                       Patient Instructions:   No discharge procedures on file.    Significant Diagnostic Studies: Labs:   CMP   Recent Labs   Lab 08/16/22  0441      K 3.9      CO2 23   *   BUN 18   CREATININE 1.3   CALCIUM 9.2   PROT 6.6   ALBUMIN 3.6   BILITOT 1.0   ALKPHOS 69   AST 24   ALT 14   ANIONGAP 11   , CBC   Recent Labs   Lab 08/16/22  0441   WBC 5.84   HGB 13.5   HCT 41.3      , Lipid Panel No results found for: CHOL, HDL, LDLCALC, TRIG, CHOLHDL, Troponin   Recent Labs   Lab 08/15/22  1529 08/15/22  1732 08/16/22  0004   TROPONINI 0.044* 0.040* 0.037*    and A1C: No results for input(s): HGBA1C in the last 4320 hours.    Pending Diagnostic Studies:     None         Medications:  Reconciled Home Medications:      Medication List      START taking these medications    nitroGLYCERIN 0.4 MG SL tablet  Commonly known as: NITROSTAT  Place 1 tablet (0.4 mg total) under the tongue every 5 (five) minutes as needed for Chest pain.        CHANGE how you take these medications    furosemide 40 MG tablet  Commonly known as: LASIX  Take 40 mg by mouth 2 (two) times daily.  What changed: Another medication with the same name was removed. Continue taking this medication, and follow the directions you see here.        CONTINUE taking these medications    acetaminophen 500 MG tablet  Commonly known as: TYLENOL  Take 1 tablet (500 mg total) by mouth every 4 (four) hours as needed for Pain.     albuterol 90 mcg/actuation inhaler  Commonly known as: PROVENTIL/VENTOLIN HFA  Inhale 2 puffs into the lungs.     denosumab 60 mg/mL Syrg  Commonly known as: PROLIA  Inject 1 mL (60 mg total) into the skin every 6 (six) months.      diclofenac sodium 1 % Gel  Commonly known as: VOLTAREN ARTHRITIS PAIN  Apply 2 g topically once daily.     dicyclomine 10 MG capsule  Commonly known as: BENTYL     docosahexaenoic acid-epa 120-180 mg Cap  Take 2 capsules by mouth.     econazole nitrate 1 % cream  Apply topically 2 (two) times daily.     ELIQUIS 2.5 mg Tab  Generic drug: apixaban  Take 2.5 mg by mouth 2 (two) times daily.     fish oil-omega-3 fatty acids 300-1,000 mg capsule  Take 2 g by mouth once daily.     GAVILYTE-C 240-22.72-6.72 -5.84 gram Solr  Generic drug: polyethylene glycol  USE AS DIRECTED     losartan 100 MG tablet  Commonly known as: COZAAR  Take 100 mg by mouth once daily.     mv-min-folic acid-lutein 500-250 mcg Chew  Take 1 tablet by mouth.     pravastatin 40 MG tablet  Commonly known as: PRAVACHOL  Take 40 mg by mouth once daily.     TRADJENTA 5 mg Tab tablet  Generic drug: linaGLIPtin  TAKE ONE(1) TABLET BY MOUTH EVERY DAY     TRESIBA FLEXTOUCH U-100 100 unit/mL (3 mL) insulin pen  Generic drug: insulin degludec  SMARTSI Unit(s) SUB-Q Every Morning        STOP taking these medications    amiodarone 200 MG Tab  Commonly known as: PACERONE     captopriL 25 MG tablet  Commonly known as: CAPOTEN     EScitalopram oxalate 10 MG tablet  Commonly known as: LEXAPRO     omeprazole 20 MG capsule  Commonly known as: PRILOSEC     predniSONE 20 MG tablet  Commonly known as: DELTASONE     ranitidine 150 MG capsule  Commonly known as: ZANTAC     ranitidine 150 MG tablet  Commonly known as: ZANTAC     sertraline 50 MG tablet  Commonly known as: ZOLOFT     ULORIC 40 mg Tab  Generic drug: febuxostat            Indwelling Lines/Drains at time of discharge:   Lines/Drains/Airways     None                 Time spent on the discharge of patient: greater than 30 minutes         Jairo Kinney MD  Department of Hospital Medicine  [facility]

## 2022-09-02 ENCOUNTER — HOSPITAL ENCOUNTER (EMERGENCY)
Facility: HOSPITAL | Age: 82
Discharge: HOME | End: 2022-09-02
Attending: EMERGENCY MEDICINE
Payer: MEDICARE

## 2022-09-02 VITALS
SYSTOLIC BLOOD PRESSURE: 133 MMHG | TEMPERATURE: 99 F | DIASTOLIC BLOOD PRESSURE: 74 MMHG | HEIGHT: 64 IN | WEIGHT: 230 LBS | BODY MASS INDEX: 39.27 KG/M2 | HEART RATE: 69 BPM | RESPIRATION RATE: 18 BRPM | OXYGEN SATURATION: 97 %

## 2022-09-02 DIAGNOSIS — R09.89 CHEST CONGESTION: ICD-10-CM

## 2022-09-02 DIAGNOSIS — U07.1 COVID: ICD-10-CM

## 2022-09-02 LAB
CTP QC/QA: YES
SARS-COV-2 RDRP RESP QL NAA+PROBE: POSITIVE

## 2022-09-02 PROCEDURE — U0002 COVID-19 LAB TEST NON-CDC: HCPCS | Mod: ER | Performed by: PHYSICIAN ASSISTANT

## 2022-09-02 PROCEDURE — 25000003 PHARM REV CODE 250: Mod: ER | Performed by: PHYSICIAN ASSISTANT

## 2022-09-02 PROCEDURE — 93005 ELECTROCARDIOGRAM TRACING: CPT | Mod: ER

## 2022-09-02 PROCEDURE — 99284 EMERGENCY DEPT VISIT MOD MDM: CPT | Mod: 25,ER

## 2022-09-02 PROCEDURE — 93010 EKG 12-LEAD: ICD-10-PCS | Mod: ,,, | Performed by: INTERNAL MEDICINE

## 2022-09-02 PROCEDURE — 93010 ELECTROCARDIOGRAM REPORT: CPT | Mod: ,,, | Performed by: INTERNAL MEDICINE

## 2022-09-02 RX ORDER — ACETAMINOPHEN 500 MG
1000 TABLET ORAL
Status: COMPLETED | OUTPATIENT
Start: 2022-09-02 | End: 2022-09-02

## 2022-09-02 RX ADMIN — ACETAMINOPHEN 1000 MG: 500 TABLET, FILM COATED ORAL at 03:09

## 2022-09-02 NOTE — FIRST PROVIDER EVALUATION
Emergency Department TeleTriage Encounter Note      CHIEF COMPLAINT    Chief Complaint   Patient presents with    COVID-19 Concerns     Complains of generalized fatigue, nasal congestion, cough, and body aches x2 days. Positive home COVID test this morning.       VITAL SIGNS   Initial Vitals [09/02/22 1230]   BP Pulse Resp Temp SpO2   (!) 154/103 74 18 99.3 °F (37.4 °C) 97 %      MAP       --            ALLERGIES    Review of patient's allergies indicates:   Allergen Reactions    Beta-adrenergic agents     Ciprofloxacin Other (See Comments)    Clindamycin      rash    Metformin     Penicillins     Verapamil     Zyloprim [allopurinol]        PROVIDER TRIAGE NOTE  This is a teletriage evaluation of a 82 y.o. female presenting to the ED with c/o chest congestion, cough, feeling ill- tested positive for covid. Feels mildly SOB. PCP recommended EKG/CXR. Also requests infusion info.     PE: speaking in full sentences no cough. Non-toxic/well-appearing. No respiratory distress, speaks in full sentences without issue. No active emesis nor cough. Normal eye contact and mentation.     Plan: CXR, EKG, amb pulse ox. Further/augmented workup at discretion of examining provider.     All ED beds are full at present; patient notified of this status.  Patient seen and medically screened by MARIELLE via teletriage. Orders initiated at triage to expedite care.  Patient is stable and will be placed in an ED bed when available.  Care will be transferred to an alternate provider when patient has been placed in an Exam Room further exam, additional orders, and disposition.         ORDERS  Labs Reviewed - No data to display    ED Orders (720h ago, onward)      None              Virtual Visit Note: The provider triage portion of this emergency department evaluation and documentation was performed via [employer] a HIPAA-compliant telemedicine application, in concert with a tele-presenter in the room. A face to face patient evaluation with one  of my colleagues will occur once the patient is placed in an emergency department room.      DISCLAIMER: This note was prepared with [employer] voice recognition transcription software. Garbled syntax, mangled pronouns, and other bizarre constructions may be attributed to that software system.

## 2022-09-02 NOTE — ED PROVIDER NOTES
Encounter Date: 9/2/2022    SCRIBE #1 NOTE: I, Dasha Guerra, am scribing for, and in the presence of,  Darion Laurent MD. I have scribed the following portions of the note - Other sections scribed: HPI, ROS, PE.     History     Chief Complaint   Patient presents with    COVID-19 Concerns     Complains of generalized fatigue, nasal congestion, cough, and body aches x2 days. Positive home COVID test this morning.     82 y.o. female with A-fib, Diabetes type 2, Hypertension, Hyperlipidemia, Heart failure, and others who presents to the ED with chief complaint of acute cough, chest pain, and shortness of breath onset last night. Patient endorses a positive at-home COVID-19 test. Her doctor instructed her to go to the ED for an antibiotic infusion. Denies nausea, vomiting, or diarrhea. Taking Eliquis daily.    The history is provided by the patient. No  was used.   Review of patient's allergies indicates:   Allergen Reactions    Beta-adrenergic agents     Ciprofloxacin Other (See Comments)    Clindamycin      rash    Metformin     Penicillins     Verapamil     Zyloprim [allopurinol]      Past Medical History:   Diagnosis Date    *Atrial fibrillation     Allergy     Atrial fibrillation     Controlled gout     Diabetes mellitus type II     Hypercholesteremia     Hyperlipidemia     Hypertension     Osteoporosis, senile      Past Surgical History:   Procedure Laterality Date    CARDIAC DEFIBRILLATOR PLACEMENT      OOPHORECTOMY      TUBAL LIGATION       Family History   Problem Relation Age of Onset    Breast cancer Sister     Colon cancer Sister     Ovarian cancer Neg Hx      Social History     Tobacco Use    Smoking status: Former    Smokeless tobacco: Never   Substance Use Topics    Alcohol use: No    Drug use: No     Review of Systems   Constitutional:  Negative for fever.   HENT:  Negative for sore throat.    Eyes:  Negative for visual disturbance.   Respiratory:  Positive for cough and shortness of  breath.    Cardiovascular:  Positive for chest pain.   Gastrointestinal:  Negative for abdominal pain, diarrhea, nausea and vomiting.   Genitourinary:  Negative for difficulty urinating.   Musculoskeletal:  Negative for back pain.   Skin:  Negative for rash.   Neurological:  Negative for headaches.     Physical Exam     Initial Vitals [09/02/22 1230]   BP Pulse Resp Temp SpO2   (!) 154/103 74 18 99.3 °F (37.4 °C) 97 %      MAP       --         Physical Exam    Constitutional: She appears well-developed and well-nourished.   HENT:   Head: Normocephalic and atraumatic.   Eyes: EOM are normal. Pupils are equal, round, and reactive to light.   Neck: Neck supple. No thyromegaly present. No JVD present.   Normal range of motion.  Cardiovascular:  Normal rate and regular rhythm.     Exam reveals no gallop and no friction rub.       No murmur heard.  Pulmonary/Chest: Effort normal and breath sounds normal.   Abdominal: There is no abdominal tenderness.   Musculoskeletal:         General: No edema.      Cervical back: Normal range of motion and neck supple.      Right lower leg: No edema.      Left lower leg: No edema.     Neurological: She is alert and oriented to person, place, and time. She has normal strength. GCS eye subscore is 4. GCS verbal subscore is 5. GCS motor subscore is 6.   Skin: Skin is warm and dry. Capillary refill takes less than 2 seconds.       ED Course   Procedures  Labs Reviewed   SARS-COV-2 RDRP GENE - Abnormal; Notable for the following components:       Result Value    POC Rapid COVID Positive (*)     All other components within normal limits    Narrative:     This test utilizes isothermal nucleic acid amplification   technology to detect the SARS-CoV-2 RdRp nucleic acid segment.   The analytical sensitivity (limit of detection) is 125 genome   equivalents/mL.   A POSITIVE result implies infection with the SARS-CoV-2 virus;   the patient is presumed to be contagious.     A NEGATIVE result means  that SARS-CoV-2 nucleic acids are not   present above the limit of detection. A NEGATIVE result should be   treated as presumptive. It does not rule out the possibility of   COVID-19 and should not be the sole basis for treatment decisions.   If COVID-19 is strongly suspected based on clinical and exposure   history, re-testing using an alternate molecular assay should be   considered.   This test is only for use under the Food and Drug   Administration s Emergency Use Authorization (EUA).   Commercial kits are provided by [employer].   Performance characteristics of the EUA have been independently   verified by [facility] Department of   Pathology and Laboratory Medicine.   _________________________________________________________________   The authorized Fact Sheet for Healthcare Providers and the authorized Fact   Sheet for Patients of the ID NOW COVID-19 are available on the FDA   website:     https://www.fda.gov/media/456365/download  https://www.fda.gov/media/086470/download                 Imaging Results              X-Ray Chest 1 View (Final result)  Result time 09/02/22 14:37:54   Procedure changed from X-Ray Chest AP Portable     Final result by Ryan Hess III, MD (09/02/22 14:37:54)                   Impression:    Impression: Cardiomegaly.      Electronically signed by: Ryan Hess MD  Date:    09/02/2022  Time:    14:37               Narrative:    EXAMINATION:  XR CHEST 1 VIEW    CLINICAL HISTORY:  covid;  COVID-19    FINDINGS:  Chest one view: There is a pacer.  There is cardiomegaly and aortic plaque.  The lungs are clear.                                  Pt has covid. VSS. Her meds create contraindication for Paxlovid. Will refer for antibody infusion.      Medications   acetaminophen tablet 1,000 mg (has no administration in time range)     Medical Decision Making:   History:   Old Medical Records: I decided to obtain old medical records.  Independently Interpreted  Test(s):   I have ordered and independently interpreted EKG Reading(s) - see prior notes  Clinical Tests:   Lab Tests: Ordered and Reviewed  Radiological Study: Ordered and Reviewed  Medical Tests: Ordered and Reviewed        Scribe Attestation:   Scribe #1: I performed the above scribed service and the documentation accurately describes the services I performed. I attest to the accuracy of the note.             I, Darion Laurent, personally performed the services described in this documentation. All medical record entries made by the scribe were at my direction and in my presence.  I have reviewed the chart and agree that the record reflects my personal performance and is accurate and complete    Clinical Impression:   Final diagnoses:  [U07.1] COVID  [R09.89] Chest congestion        ED Disposition Condition    Discharge Stable          ED Prescriptions    None       Follow-up Information       Follow up With Specialties Details Why Contact Info    Marianne Padilla MD Internal Medicine   [address] [place] [address]  [id number]  [facility] [address]  [phone]      [facility] ED Emergency Medicine  As needed [id number] [facility] [id number] [phone]             Darion Laurent MD  09/02/22 6253

## 2022-09-05 ENCOUNTER — TELEPHONE (OUTPATIENT)
Dept: INFECTIOUS DISEASES | Facility: HOSPITAL | Age: 82
End: 2022-09-05
Payer: MEDICARE

## 2022-09-06 ENCOUNTER — TELEPHONE (OUTPATIENT)
Dept: INFECTIOUS DISEASES | Facility: HOSPITAL | Age: 82
End: 2022-09-06
Payer: MEDICARE

## 2022-09-06 ENCOUNTER — INFUSION (OUTPATIENT)
Dept: INFECTIOUS DISEASES | Facility: HOSPITAL | Age: 82
End: 2022-09-06
Attending: EMERGENCY MEDICINE
Payer: MEDICARE

## 2022-09-06 VITALS
RESPIRATION RATE: 18 BRPM | HEIGHT: 64 IN | BODY MASS INDEX: 38.24 KG/M2 | HEART RATE: 73 BPM | TEMPERATURE: 99 F | DIASTOLIC BLOOD PRESSURE: 56 MMHG | WEIGHT: 224 LBS | OXYGEN SATURATION: 99 % | SYSTOLIC BLOOD PRESSURE: 125 MMHG

## 2022-09-06 DIAGNOSIS — U07.1 COVID: Primary | ICD-10-CM

## 2022-09-06 PROCEDURE — 25000003 PHARM REV CODE 250: Performed by: INTERNAL MEDICINE

## 2022-09-06 PROCEDURE — 63600175 PHARM REV CODE 636 W HCPCS: Performed by: INTERNAL MEDICINE

## 2022-09-06 PROCEDURE — M0222 HC IV INJECTION, BEBTELOVIMAB, INCL POST ADMIN MONIT: HCPCS | Performed by: INTERNAL MEDICINE

## 2022-09-06 RX ORDER — ACETAMINOPHEN 325 MG/1
650 TABLET ORAL
Status: ACTIVE | OUTPATIENT
Start: 2022-09-06 | End: 2022-09-07

## 2022-09-06 RX ORDER — ONDANSETRON 4 MG/1
4 TABLET, ORALLY DISINTEGRATING ORAL
Status: DISPENSED | OUTPATIENT
Start: 2022-09-06 | End: 2022-09-07

## 2022-09-06 RX ORDER — EPINEPHRINE 0.3 MG/.3ML
0.3 INJECTION SUBCUTANEOUS
Status: ACTIVE | OUTPATIENT
Start: 2022-09-06 | End: 2022-09-09

## 2022-09-06 RX ORDER — DIPHENHYDRAMINE HYDROCHLORIDE 50 MG/ML
25 INJECTION INTRAMUSCULAR; INTRAVENOUS
Status: ACTIVE | OUTPATIENT
Start: 2022-09-06 | End: 2022-09-07

## 2022-09-06 RX ORDER — BEBTELOVIMAB 87.5 MG/ML
175 INJECTION, SOLUTION INTRAVENOUS
Status: COMPLETED | OUTPATIENT
Start: 2022-09-06 | End: 2022-09-06

## 2022-09-06 RX ORDER — ALBUTEROL SULFATE 90 UG/1
2 AEROSOL, METERED RESPIRATORY (INHALATION)
Status: ACTIVE | OUTPATIENT
Start: 2022-09-06 | End: 2022-09-09

## 2022-09-06 RX ADMIN — ONDANSETRON 4 MG: 4 TABLET, ORALLY DISINTEGRATING ORAL at 01:09

## 2022-09-06 RX ADMIN — BEBTELOVIMAB 175 MG: 87.5 INJECTION, SOLUTION INTRAVENOUS at 01:09

## 2022-09-06 NOTE — PROGRESS NOTES
1308  Bebtelovimab IV Injection administered. Pt tolerated injection well. No S/S of injection reaction noted at present time. One hour observation started.

## 2022-09-06 NOTE — PROGRESS NOTES
9/6/2022 1334    Patient verbalizing nausea and subsequently vomited x 1. Clear emesis noted. Zofran administered as charted. VS as charted. Patient remains on pulse ox. A&O, calm.

## 2022-09-06 NOTE — PROGRESS NOTES
Patient remains with no signs of complications noted. Patient received Bebtelovimab IV Injection according to FDA recommendations and [facility] SOP without complications noted and left with mask in place. Drug fact sheet provided. Pt discharged home. Ambulatory. Remains AAox3. No distress noted. RR even and unlabored.

## 2022-09-06 NOTE — PROGRESS NOTES
Patient arrives for Bebtelovimab IV Injection. Ambulatory. Pt AAox3. No distress noted. RR even and unlabored.     Symptoms and onset date:  9/1/22; cough, congestion, mucous, chest tightness, sneezing, runny nose    Tested COVID + on 9/1/22

## 2022-10-04 ENCOUNTER — OFFICE VISIT (OUTPATIENT)
Dept: PODIATRY | Facility: CLINIC | Age: 82
End: 2022-10-04
Payer: MEDICARE

## 2022-10-04 VITALS — BODY MASS INDEX: 38.24 KG/M2 | WEIGHT: 224 LBS | HEIGHT: 64 IN

## 2022-10-04 DIAGNOSIS — M20.12 HALLUX ABDUCTO VALGUS, LEFT: ICD-10-CM

## 2022-10-04 DIAGNOSIS — M20.11 HALLUX ABDUCTO VALGUS, RIGHT: ICD-10-CM

## 2022-10-04 DIAGNOSIS — L84 CORN OR CALLUS: ICD-10-CM

## 2022-10-04 DIAGNOSIS — N18.4 DIABETES MELLITUS DUE TO UNDERLYING CONDITION WITH STAGE 4 CHRONIC KIDNEY DISEASE, WITHOUT LONG-TERM CURRENT USE OF INSULIN: Primary | ICD-10-CM

## 2022-10-04 DIAGNOSIS — B35.1 NAIL DERMATOPHYTOSIS: ICD-10-CM

## 2022-10-04 DIAGNOSIS — E08.22 DIABETES MELLITUS DUE TO UNDERLYING CONDITION WITH STAGE 4 CHRONIC KIDNEY DISEASE, WITHOUT LONG-TERM CURRENT USE OF INSULIN: Primary | ICD-10-CM

## 2022-10-04 DIAGNOSIS — M20.41 HAMMER TOES OF BOTH FEET: ICD-10-CM

## 2022-10-04 DIAGNOSIS — M20.42 HAMMER TOES OF BOTH FEET: ICD-10-CM

## 2022-10-04 PROCEDURE — 1160F RVW MEDS BY RX/DR IN RCRD: CPT | Mod: CPTII,S$GLB,, | Performed by: PODIATRIST

## 2022-10-04 PROCEDURE — 99499 NO LOS: ICD-10-PCS | Mod: S$GLB,,, | Performed by: PODIATRIST

## 2022-10-04 PROCEDURE — 99999 PR PBB SHADOW E&M-EST. PATIENT-LVL IV: ICD-10-PCS | Mod: PBBFAC,,, | Performed by: PODIATRIST

## 2022-10-04 PROCEDURE — 3288F FALL RISK ASSESSMENT DOCD: CPT | Mod: CPTII,S$GLB,, | Performed by: PODIATRIST

## 2022-10-04 PROCEDURE — 1160F PR REVIEW ALL MEDS BY PRESCRIBER/CLIN PHARMACIST DOCUMENTED: ICD-10-PCS | Mod: CPTII,S$GLB,, | Performed by: PODIATRIST

## 2022-10-04 PROCEDURE — 11721 PR DEBRIDEMENT OF NAILS, 6 OR MORE: ICD-10-PCS | Mod: 59,Q9,S$GLB, | Performed by: PODIATRIST

## 2022-10-04 PROCEDURE — 1125F AMNT PAIN NOTED PAIN PRSNT: CPT | Mod: CPTII,S$GLB,, | Performed by: PODIATRIST

## 2022-10-04 PROCEDURE — 1125F PR PAIN SEVERITY QUANTIFIED, PAIN PRESENT: ICD-10-PCS | Mod: CPTII,S$GLB,, | Performed by: PODIATRIST

## 2022-10-04 PROCEDURE — 1159F PR MEDICATION LIST DOCUMENTED IN MEDICAL RECORD: ICD-10-PCS | Mod: CPTII,S$GLB,, | Performed by: PODIATRIST

## 2022-10-04 PROCEDURE — 99499 UNLISTED E&M SERVICE: CPT | Mod: S$GLB,,, | Performed by: PODIATRIST

## 2022-10-04 PROCEDURE — 1101F PT FALLS ASSESS-DOCD LE1/YR: CPT | Mod: CPTII,S$GLB,, | Performed by: PODIATRIST

## 2022-10-04 PROCEDURE — 99999 PR PBB SHADOW E&M-EST. PATIENT-LVL IV: CPT | Mod: PBBFAC,,, | Performed by: PODIATRIST

## 2022-10-04 PROCEDURE — 11721 DEBRIDE NAIL 6 OR MORE: CPT | Mod: 59,Q9,S$GLB, | Performed by: PODIATRIST

## 2022-10-04 PROCEDURE — 11056 PARNG/CUTG B9 HYPRKR LES 2-4: CPT | Mod: Q9,S$GLB,, | Performed by: PODIATRIST

## 2022-10-04 PROCEDURE — 11056 PR TRIM BENIGN HYPERKERATOTIC SKIN LESION,2-4: ICD-10-PCS | Mod: Q9,S$GLB,, | Performed by: PODIATRIST

## 2022-10-04 PROCEDURE — 1101F PR PT FALLS ASSESS DOC 0-1 FALLS W/OUT INJ PAST YR: ICD-10-PCS | Mod: CPTII,S$GLB,, | Performed by: PODIATRIST

## 2022-10-04 PROCEDURE — 1159F MED LIST DOCD IN RCRD: CPT | Mod: CPTII,S$GLB,, | Performed by: PODIATRIST

## 2022-10-04 PROCEDURE — 3288F PR FALLS RISK ASSESSMENT DOCUMENTED: ICD-10-PCS | Mod: CPTII,S$GLB,, | Performed by: PODIATRIST

## 2022-10-04 RX ORDER — BUSPIRONE HYDROCHLORIDE 5 MG/1
5 TABLET ORAL 2 TIMES DAILY
COMMUNITY

## 2022-10-04 RX ORDER — OLOPATADINE HYDROCHLORIDE 1 MG/ML
1 SOLUTION/ DROPS OPHTHALMIC
Status: ON HOLD | COMMUNITY
End: 2023-05-03

## 2022-10-04 RX ORDER — LEVALBUTEROL INHALATION SOLUTION 0.63 MG/3ML
1 SOLUTION RESPIRATORY (INHALATION)
COMMUNITY
End: 2023-01-05

## 2022-10-04 RX ORDER — FEBUXOSTAT 40 MG/1
20 TABLET, FILM COATED ORAL
Status: ON HOLD | COMMUNITY
End: 2023-05-03 | Stop reason: CLARIF

## 2022-10-04 RX ORDER — ERGOCALCIFEROL 1.25 MG/1
50000 CAPSULE ORAL
COMMUNITY
Start: 2022-08-08

## 2022-10-04 RX ORDER — EMPAGLIFLOZIN 25 MG/1
25 TABLET, FILM COATED ORAL DAILY
COMMUNITY
Start: 2022-09-20

## 2022-10-04 RX ORDER — PANTOPRAZOLE SODIUM 20 MG/1
20 TABLET, DELAYED RELEASE ORAL EVERY MORNING
COMMUNITY
Start: 2022-08-19

## 2022-10-04 NOTE — PATIENT INSTRUCTIONS
Over the counter pain creams: Voltaren Gel, Biofreeze, Bengay, tiger balm, two old goat, lidocaine gel,  Absorbine Veterinary Liniment Gel Topical Analgesic Sore Muscle and Joint Pain Relief    Recommend lotions: eucerin, eucerin for diabetics, aquaphor, A&D ointment, gold bond for diabetics, sween, [employer] all purpose baby ointment,  urea 40 with aloe (found on amazon.com)    Shoe recommendations: (try 6pm.com, zappos.com , nordstromrack.Xmybox, or shoes.com for discounted prices) you can visit [employer] in [place]  or [facility] in the [facility] (there are also several shoe brand outlets in the [facility])    Asics (GT 2000 or gel foundations), new balance stability type shoes (such as the 940 series), saucony (stabil c3),  William (GTS or Beast or transcend), propet (tennis shoe)    Kim Fernandez (women) Danya&Hardik (men), clarks, crocs, aerosoles, naturalizers, SAS, ecco, born, andrew nation, rockports (dress shoes)    Vionic, burkenstocks, fitflops, propet (sandals)  Nike comfort thong sandals, crocs, propet (house shoes)    Nail Home remedy:  Vicks Vapor rub or Emuaid to nails for easier manageability    Diabetes: Inspecting Your Feet  Diabetes increases your chances of developing foot problems. So inspect your feet every day. This helps you find small skin irritations before they become serious infections. If you have trouble seeing the bottoms of your feet, use a mirror or ask a family member or friend to help.     Pressure spots on the bottom of the foot are common areas where problems develop.   How to check your feet  Below are tips to help you look for foot problems. Try to check your feet at the same time each day, such as when you get out of bed in the morning:  Check the top of each foot. The tops of toes, back of the heel, and outer edge of the foot can get a lot of rubbing from poor-fitting shoes.  Check the bottom of each foot. Daily wear and tear often leads to problems at pressure spots.  Check  the toes and nails. Fungal infections often occur between toes. Toenail problems can also be a sign of fungal infections or lead to breaks in the skin.  Check your shoes, too. Loose objects inside a shoe can injure the foot. Use your hand to feel inside your shoes for things like shakeel, loose stitching, or rough areas that could irritate your skin.  Warning signs  Look for any color changes in the foot. Redness with streaks can signal a severe infection, which needs immediate medical attention. Tell your doctor right away if you have any of these problems:  Swelling, sometimes with color changes, may be a sign of poor blood flow or infection. Symptoms include tenderness and an increase in the size of your foot.  Warm or hot areas on your feet may be signs of infection. A foot that is cold may not be getting enough blood.  Sensations such as burning, tingling, or pins and needles can be signs of a problem. Also check for areas that may be numb.  Hot spots are caused by friction or pressure. Look for hot spots in areas that get a lot of rubbing. Hot spots can turn into blisters, calluses, or sores.  Cracks and sores are caused by dry or irritated skin. They are a sign that the skin is breaking down, which can lead to infection.  Toenail problems to watch for include nails growing into the skin (ingrown toenail) and causing redness or pain. Thick, yellow, or discolored nails can signal a fungal infection.  Drainage and odor can develop from untreated sores and ulcers. Call your doctor right away if you notice white or yellow drainage, bleeding, or unpleasant odor.   © 7207-3161 [employer]. [address] [place] [address]. All rights reserved. This information is not intended as a substitute for professional medical care. Always follow your healthcare professional's instructions.        Step-by-Step:  Inspecting Your Feet (Diabetes)    Date Last Reviewed: 10/1/2016  © 8612-9243 [employer]. [address] [place] [address], [place] [address]. All rights reserved. This information is not intended as a substitute for professional medical care. Always follow your healthcare professional's instructions.

## 2022-10-04 NOTE — PROGRESS NOTES
Subjective:      Patient ID: Nyasia Frye is a 82 y.o. female.    Chief Complaint: Diabetes Mellitus (8/24/22 [facility]), Diabetic Foot Exam, Nail Care, and Numbness    Nyasia is a 82 y.o. female who presents to the clinic for evaluation and treatment of high risk feet. Nyasia has a past medical history of *Atrial fibrillation, Allergy, Atrial fibrillation, Controlled gout, Diabetes mellitus type II, Hypercholesteremia, Hyperlipidemia, Hypertension, and Osteoporosis, senile. The patient's chief complaint is long, thick toenails.This patient has documented high risk feet requiring routine maintenance secondary to diabetes mellitis and those secondary complications of diabetes, as mentioned..    PCP: Marianne Padilla MD    Chief Complaint   Patient presents with    Diabetes Mellitus     8/24/22 [facility]    Diabetic Foot Exam    Nail Care    Numbness   Date Last Seen by PCP: 08/24/2022 with endocrinology     Current shoe gear: casual shoes    Hemoglobin A1c   Date Value Ref Range Status   10/02/2021 7.1 (H) <5.7 % of total Hgb Final     Comment:     For someone without known diabetes, a hemoglobin A1c  value of 6.5% or greater indicates that they may have   diabetes and this should be confirmed with a follow-up   test.    For someone with known diabetes, a value <7% indicates   that their diabetes is well controlled and a value   greater than or equal to 7% indicates suboptimal   control. A1c targets should be individualized based on   duration of diabetes, age, comorbid conditions, and   other considerations.    Currently, no consensus exists regarding use of  hemoglobin A1c for diagnosis of diabetes for children.       07/08/2021 7.6 (H) <5.7 % of total Hgb Final     Comment:     For someone without known diabetes, a hemoglobin A1c  value of 6.5% or greater indicates that they may have   diabetes and this should be confirmed with a follow-up   test.    For someone with known diabetes, a value <7% indicates   that their  diabetes is well controlled and a value   greater than or equal to 7% indicates suboptimal   control. A1c targets should be individualized based on   duration of diabetes, age, comorbid conditions, and   other considerations.    Currently, no consensus exists regarding use of  hemoglobin A1c for diagnosis of diabetes for children.       10/24/2020 8.4 (H) <5.7 % of total Hgb Final     Comment:     For someone without known diabetes, a hemoglobin A1c  value of 6.5% or greater indicates that they may have   diabetes and this should be confirmed with a follow-up   test.    For someone with known diabetes, a value <7% indicates   that their diabetes is well controlled and a value   greater than or equal to 7% indicates suboptimal   control. A1c targets should be individualized based on   duration of diabetes, age, comorbid conditions, and   other considerations.    Currently, no consensus exists regarding use of  hemoglobin A1c for diagnosis of diabetes for children.               Patient Active Problem List   Diagnosis    Senile osteoporosis    Diabetes mellitus, type 2    Hypertension    Atrial fibrillation    Hyperlipidemia    Rash    Cellulitis    Vitamin D deficiency    CKD (chronic kidney disease), stage III    Morbid obesity    ICD (implantable cardioverter-defibrillator) in place    IBS (irritable bowel syndrome)    Hyperparathyroidism due to renal insufficiency    Gout    Gastroesophageal reflux disease    Nonischemic cardiomyopathy    Chronic systolic heart failure    Chronic back pain    Benign hypertensive renal disease    Abnormal thyroid function test    Ischemic congestive cardiomyopathy    COVID       Current Outpatient Medications on File Prior to Visit   Medication Sig Dispense Refill    acetaminophen (TYLENOL) 500 MG tablet Take 1 tablet (500 mg total) by mouth every 4 (four) hours as needed for Pain. 20 tablet 0    albuterol (PROVENTIL/VENTOLIN HFA) 90 mcg/actuation inhaler Inhale 2 puffs into the  lungs.      busPIRone (BUSPAR) 5 MG Tab Take 5 mg by mouth.      diclofenac sodium (VOLTAREN ARTHRITIS PAIN) 1 % Gel Apply 2 g topically once daily. 20 g 0    dicyclomine (BENTYL) 10 MG capsule       docosahexaenoic acid-epa 120-180 mg Cap Take 2 capsules by mouth.      ELIQUIS 2.5 mg Tab Take 2.5 mg by mouth 2 (two) times daily.      ergocalciferol (ERGOCALCIFEROL) 50,000 unit Cap SMARTSI Capsule(s) By Mouth      febuxostat (ULORIC) 40 mg Tab Take 20 mg by mouth.      fish oil-omega-3 fatty acids 300-1,000 mg capsule Take 2 g by mouth once daily.        furosemide (LASIX) 40 MG tablet Take 40 mg by mouth 2 (two) times daily.  3    GAVILYTE-C 240-22.72-6.72 -5.84 gram SolR USE AS DIRECTED      glucagon 1 mg SolR Inject as directed.      JARDIANCE 25 mg tablet Take 25 mg by mouth once daily.      levalbuterol (XOPENEX) 0.63 mg/3 mL nebulizer solution Inhale 1 ampule into the lungs.      losartan (COZAAR) 100 MG tablet Take 100 mg by mouth once daily.      multivitamin with minerals tablet Take by mouth.      mv-min-folic acid-lutein 500-250 mcg Chew Take 1 tablet by mouth.      nitroGLYCERIN (NITROSTAT) 0.4 MG SL tablet Place 1 tablet (0.4 mg total) under the tongue every 5 (five) minutes as needed for Chest pain. 30 tablet 1    olopatadine (PATANOL) 0.1 % ophthalmic solution 1 drop.      pantoprazole (PROTONIX) 20 MG tablet Take 20 mg by mouth every morning.      pravastatin (PRAVACHOL) 40 MG tablet Take 40 mg by mouth once daily.        ranitidine (ZANTAC) 150 MG capsule Take 150 mg by mouth.      TRADJENTA 5 mg Tab tablet TAKE ONE(1) TABLET BY MOUTH EVERY DAY  4    TRESIBA FLEXTOUCH U-100 100 unit/mL (3 mL) InPn SMARTSI Unit(s) SUB-Q Every Morning      denosumab (PROLIA) 60 mg/mL Syrg Inject 1 mL (60 mg total) into the skin every 6 (six) months.      econazole nitrate 1 % cream Apply topically 2 (two) times daily. 85 g 1    [DISCONTINUED] amiodarone (PACERONE) 200 MG Tab Take by mouth once daily.       "[DISCONTINUED] captopriL (CAPOTEN) 25 MG tablet Take 25 mg by mouth 2 (two) times daily.      [DISCONTINUED] escitalopram oxalate (LEXAPRO) 10 MG tablet Take 10 mg by mouth once daily.      [DISCONTINUED] omeprazole (PRILOSEC) 20 MG capsule TAKE 1 CAPSULE BY MOUTH EVERY DAY IN THE MORNING      [DISCONTINUED] predniSONE (DELTASONE) 20 MG tablet Take 1 tablet (20 mg total) by mouth once daily. 9 tablet 0    [DISCONTINUED] sertraline (ZOLOFT) 50 MG tablet Take 50 mg by mouth once daily.  0     No current facility-administered medications on file prior to visit.       Review of patient's allergies indicates:   Allergen Reactions    Clindamycin      rash    Metformin     Penicillins     Zyloprim [allopurinol]        Past Surgical History:   Procedure Laterality Date    CARDIAC DEFIBRILLATOR PLACEMENT      OOPHORECTOMY      TUBAL LIGATION         Family History   Problem Relation Age of Onset    Breast cancer Sister     Colon cancer Sister     Ovarian cancer Neg Hx        Social History     Socioeconomic History    Marital status:    Tobacco Use    Smoking status: Former    Smokeless tobacco: Never   Substance and Sexual Activity    Alcohol use: No    Drug use: No    Sexual activity: Never       Review of Systems   Constitution: Negative for chills and fever.   Cardiovascular: Positive for leg swelling. Negative for claudication.   Respiratory: Negative for cough and shortness of breath.    Skin: Positive for dry skin and nail changes. Negative for itching and rash.   Musculoskeletal: Positive for joint pain and myalgias. Negative for falls, joint swelling and muscle weakness.   Gastrointestinal: Negative for diarrhea, nausea and vomiting.   Neurological: Positive for numbness and paresthesias. Negative for tremors and weakness.   Psychiatric/Behavioral: Negative for altered mental status and hallucinations.           Objective:      Vitals:    10/04/22 1042   Weight: 101.6 kg (223 lb 15.8 oz)   Height: 5' 4" " (1.626 m)   PainSc:   4       Physical Exam   Constitutional: She appears well-developed and well-nourished.  Non-toxic appearance. She does not have a sickly appearance. No distress.   alert and oriented x 3.    Cardiovascular:   Pulses:       Dorsalis pedis pulses are 1+ on the right side, and 1+ on the left side.        Posterior tibial pulses are 1+ on the right side, and 1+ on the left side.   Dorsalis pedis and posterior tibial pulses are diminished Bilaterally. Toes are cool to touch. Feet are warm proximally.There is decreased digital hair. Skin is atrophic, slightly hyperpigmented, and mildly edematous     Pulmonary/Chest: No respiratory distress.   Musculoskeletal: She exhibits no deformity.        Right ankle: She exhibits swelling. No tenderness. No lateral malleolus, no medial malleolus, no AITFL, no CF ligament and no posterior TFL tenderness found. Achilles tendon exhibits no pain, no defect and normal Collins's test results.        Left ankle: She exhibits swelling. No tenderness. No lateral malleolus, no medial malleolus, no AITFL, no CF ligament and no posterior TFL tenderness found. Achilles tendon exhibits no pain, no defect and normal Collins's test results.        Right foot: There is no tenderness and no bony tenderness.        Left foot: There is no tenderness and no bony tenderness.   Decreased stride, station of gait.  apropulsive toe off.  Increased angle and base of gait.    Patient has hammertoes of digits 2-5 bilateral partially reducible without symptom today.    Visible and palpable bunion without pain at dorsomedial 1st metatarsal head right and left.  Hallux abducted right and left partially reducible, tracks laterally without being track bound.  No ecchymosis, erythema, edema, or cardinal signs infection or signs of trauma same foot.    Muscle strength is 5/5 in all groups bilaterally.           Feet:   Right Foot:   Protective Sensation: 5 sites tested. 5 sites sensed.   Left  Foot:   Protective Sensation: 5 sites tested. 5 sites sensed.   Lymphadenopathy:   No lymphatic streaking     Neurological: She displays no atrophy. No sensory deficit.   Light touch present    Paresthesias, and hyperesthesia bilateral feet at toes with no clearly identified trigger or source.       Skin: Skin is warm, dry and intact. No abrasion, no bruising and no rash noted. She is not diaphoretic. No cyanosis or erythema. No pallor. Nails show no clubbing.   Toenails 1-5 bilaterally are elongated by 2-3 mm, thickened by 2-3 mm, discolored/yellowed, dystrophic, brittle with subungual debris.     Focal hyperkeratotic lesion consisting entirely of hyperkeratotic tissue without open skin, drainage, pus, fluctuance, malodor, or signs of infection medial hallux IPJ keegan and plantar heel midline keegan     Scaling dryness in a moccasin distribution is noted to the bilateral lower extremities without associated erythema.      Psychiatric: Her mood appears not anxious. Her affect is not inappropriate. Her speech is not slurred. She is not combative. She is communicative. She is attentive.   Nursing note and vitals reviewed.          Assessment:       Encounter Diagnoses   Name Primary?    Diabetes mellitus due to underlying condition with stage 4 chronic kidney disease, without long-term current use of insulin Yes    Hammer toes of both feet     Hallux abducto valgus, left     Hallux abducto valgus, right     Corn or callus     Nail dermatophytosis            Plan:       Nyasia was seen today for diabetes mellitus, diabetic foot exam, nail care and numbness.    Diagnoses and all orders for this visit:    Diabetes mellitus due to underlying condition with stage 4 chronic kidney disease, without long-term current use of insulin  -     DIABETIC SHOES FOR HOME USE    Hammer toes of both feet  -     DIABETIC SHOES FOR HOME USE    Hallux abducto valgus, left  -     DIABETIC SHOES FOR HOME USE    Hallux abducto valgus, right  -      DIABETIC SHOES FOR HOME USE    Corn or callus  -     DIABETIC SHOES FOR HOME USE    Nail dermatophytosis      I counseled the patient on her conditions, their implications and medical management.    Education about the diabetic foot, neuropathy, and prevention of limb loss.    Shoe inspection. Diabetic Foot Education. Patient reminded of the importance of good nutrition/healthy diet/weight management and blood sugar control to help prevent podiatric complications of diabetes. Patient instructed on proper foot hygeine. Wear comfortable, proper fitting shoes. Wash feet daily. Dry well. After drying, apply moisturizer to feet (no lotion to webspaces). Inspect feet daily for skin breaks, blisters, swelling, or redness. Wear cotton socks (preferably white)  Change socks every day. Do NOT walk barefoot. Do NOT use heating pads or hot water soaks. We discussed wearing proper shoe gear, daily foot inspections, never walking without protective shoe gear.     Rx diabetic shoes for protection and support    Discussed edema control and the continued use of compression stockings.     Discussed importance of daily moisturizer to the feet such as Gold [employer] diabetic foot cream    After cleansing the  area w/ alcohol prep pad the above mentioned hyperkeratosis was trimmed utilizing No 15 scapel, to a smooth base with out incident. Patient tolerated this  well and reported comfort to the area of medial hallux IPJ keeagn and plantar heel midline keegan    With patient's permission, nails were aggressively reduced and debrided x 10 to their soft tissue attachment mechanically and with electric , removing all offending nail and debris. Patient relates relief following the procedure.     Referral to neurology for paresthesias and LBP    She will continue to monitor the areas daily, inspect her feet, wear protective shoe gear when ambulatory, moisturizer to maintain skin integrity and follow in this office in approximately 2-5 months,  sooner p.r.n.

## 2023-01-05 ENCOUNTER — HOSPITAL ENCOUNTER (EMERGENCY)
Facility: HOSPITAL | Age: 83
Discharge: HOME | End: 2023-01-05
Attending: EMERGENCY MEDICINE
Payer: MEDICARE

## 2023-01-05 VITALS
HEART RATE: 71 BPM | TEMPERATURE: 99 F | WEIGHT: 215 LBS | BODY MASS INDEX: 36.7 KG/M2 | DIASTOLIC BLOOD PRESSURE: 76 MMHG | HEIGHT: 64 IN | SYSTOLIC BLOOD PRESSURE: 162 MMHG | OXYGEN SATURATION: 96 % | RESPIRATION RATE: 16 BRPM

## 2023-01-05 DIAGNOSIS — J40 BRONCHITIS: ICD-10-CM

## 2023-01-05 DIAGNOSIS — E87.6 HYPOKALEMIA: ICD-10-CM

## 2023-01-05 DIAGNOSIS — J44.1 COPD WITH ACUTE EXACERBATION: Primary | ICD-10-CM

## 2023-01-05 LAB
ALBUMIN SERPL-MCNC: 3.9 G/DL (ref 3.3–5.5)
ALLENS TEST: ABNORMAL
ALLENS TEST: ABNORMAL
ALP SERPL-CCNC: 73 U/L (ref 42–141)
BILIRUB SERPL-MCNC: 1.3 MG/DL (ref 0.2–1.6)
BILIRUBIN, POC UA: NEGATIVE
BLOOD, POC UA: NEGATIVE
BUN SERPL-MCNC: 22 MG/DL (ref 7–22)
CALCIUM SERPL-MCNC: 9.5 MG/DL (ref 8–10.3)
CHLORIDE SERPL-SCNC: 101 MMOL/L (ref 98–108)
CLARITY, POC UA: CLEAR
COLOR, POC UA: YELLOW
CREAT SERPL-MCNC: 1.4 MG/DL (ref 0.6–1.2)
CTP QC/QA: YES
GLUCOSE SERPL-MCNC: 115 MG/DL (ref 73–118)
GLUCOSE, POC UA: ABNORMAL
HCO3 UR-SCNC: 29 MMOL/L (ref 24–28)
HCO3 UR-SCNC: 29.4 MMOL/L (ref 24–28)
INFLUENZA A ANTIGEN, POC: NEGATIVE
INFLUENZA B ANTIGEN, POC: NEGATIVE
KETONES, POC UA: NEGATIVE
LDH SERPL L TO P-CCNC: 0.78 MMOL/L (ref 0.5–2.2)
LDH SERPL L TO P-CCNC: 1.16 MMOL/L (ref 0.5–2.2)
LEUKOCYTE EST, POC UA: NEGATIVE
NITRITE, POC UA: NEGATIVE
PCO2 BLDA: 47.1 MMHG (ref 35–45)
PCO2 BLDA: 53.1 MMHG (ref 35–45)
PH SMN: 7.35 [PH] (ref 7.35–7.45)
PH SMN: 7.4 [PH] (ref 7.35–7.45)
PH UR STRIP: 6 [PH]
PO2 BLDA: 15 MMHG (ref 40–60)
PO2 BLDA: 45 MMHG (ref 40–60)
POC ALT (SGPT): 15 U/L (ref 10–47)
POC AST (SGOT): 32 U/L (ref 11–38)
POC B-TYPE NATRIURETIC PEPTIDE: 262 PG/ML (ref 0–100)
POC BE: 3 MMOL/L
POC BE: 3 MMOL/L
POC CARDIAC TROPONIN I: 0.03 NG/ML (ref 0–0.08)
POC PTINR: 1.2 (ref 0.9–1.2)
POC PTWBT: 14.6 SEC (ref 9.7–14.3)
POC SATURATED O2: 18 % (ref 95–100)
POC SATURATED O2: 80 % (ref 95–100)
POC TCO2: 28 MMOL/L (ref 18–33)
POC TCO2: 30 MMOL/L (ref 24–29)
POC TCO2: 31 MMOL/L (ref 24–29)
POCT GLUCOSE: 125 MG/DL (ref 70–110)
POTASSIUM BLD-SCNC: 3.5 MMOL/L (ref 3.6–5.1)
PROCALCITONIN SERPL IA-MCNC: 0.03 NG/ML
PROTEIN, POC UA: NEGATIVE
PROTEIN, POC: 6.7 G/DL (ref 6.4–8.1)
SAMPLE: ABNORMAL
SAMPLE: NORMAL
SARS-COV-2 RDRP RESP QL NAA+PROBE: NEGATIVE
SITE: ABNORMAL
SITE: ABNORMAL
SODIUM BLD-SCNC: 142 MMOL/L (ref 128–145)
SPECIFIC GRAVITY, POC UA: 1.02
UROBILINOGEN, POC UA: 1 E.U./DL

## 2023-01-05 PROCEDURE — 85610 PROTHROMBIN TIME: CPT | Mod: ER

## 2023-01-05 PROCEDURE — 87086 URINE CULTURE/COLONY COUNT: CPT | Performed by: EMERGENCY MEDICINE

## 2023-01-05 PROCEDURE — 25000242 PHARM REV CODE 250 ALT 637 W/ HCPCS: Mod: ER | Performed by: EMERGENCY MEDICINE

## 2023-01-05 PROCEDURE — 85025 COMPLETE CBC W/AUTO DIFF WBC: CPT | Mod: ER

## 2023-01-05 PROCEDURE — 25000003 PHARM REV CODE 250: Mod: ER | Performed by: EMERGENCY MEDICINE

## 2023-01-05 PROCEDURE — 83880 ASSAY OF NATRIURETIC PEPTIDE: CPT | Mod: ER

## 2023-01-05 PROCEDURE — 96365 THER/PROPH/DIAG IV INF INIT: CPT | Mod: ER

## 2023-01-05 PROCEDURE — 84145 PROCALCITONIN (PCT): CPT | Performed by: EMERGENCY MEDICINE

## 2023-01-05 PROCEDURE — 94761 N-INVAS EAR/PLS OXIMETRY MLT: CPT | Mod: ER

## 2023-01-05 PROCEDURE — 82803 BLOOD GASES ANY COMBINATION: CPT | Mod: ER

## 2023-01-05 PROCEDURE — 99284 EMERGENCY DEPT VISIT MOD MDM: CPT | Mod: 25,ER

## 2023-01-05 PROCEDURE — 63600175 PHARM REV CODE 636 W HCPCS: Mod: ER | Performed by: EMERGENCY MEDICINE

## 2023-01-05 PROCEDURE — 87804 INFLUENZA ASSAY W/OPTIC: CPT | Mod: 59,ER

## 2023-01-05 PROCEDURE — 80053 COMPREHEN METABOLIC PANEL: CPT | Mod: ER

## 2023-01-05 PROCEDURE — 82962 GLUCOSE BLOOD TEST: CPT | Mod: ER

## 2023-01-05 PROCEDURE — 94640 AIRWAY INHALATION TREATMENT: CPT | Mod: ER,XB

## 2023-01-05 PROCEDURE — 87635 SARS-COV-2 COVID-19 AMP PRB: CPT | Mod: ER | Performed by: EMERGENCY MEDICINE

## 2023-01-05 PROCEDURE — 96361 HYDRATE IV INFUSION ADD-ON: CPT | Mod: ER

## 2023-01-05 PROCEDURE — 84484 ASSAY OF TROPONIN QUANT: CPT | Mod: ER

## 2023-01-05 PROCEDURE — 87040 BLOOD CULTURE FOR BACTERIA: CPT | Mod: 59 | Performed by: EMERGENCY MEDICINE

## 2023-01-05 PROCEDURE — 96375 TX/PRO/DX INJ NEW DRUG ADDON: CPT | Mod: ER

## 2023-01-05 PROCEDURE — 81003 URINALYSIS AUTO W/O SCOPE: CPT | Mod: ER

## 2023-01-05 RX ORDER — LEVALBUTEROL 1.25 MG/.5ML
1.25 SOLUTION, CONCENTRATE RESPIRATORY (INHALATION) ONCE
Status: COMPLETED | OUTPATIENT
Start: 2023-01-05 | End: 2023-01-05

## 2023-01-05 RX ORDER — LEVALBUTEROL INHALATION SOLUTION 1.25 MG/3ML
1 SOLUTION RESPIRATORY (INHALATION) EVERY 6 HOURS PRN
Qty: 30 EACH | Refills: 0 | Status: SHIPPED | OUTPATIENT
Start: 2023-01-05 | End: 2024-01-05

## 2023-01-05 RX ORDER — METHYLPREDNISOLONE SOD SUCC 125 MG
125 VIAL (EA) INJECTION
Status: COMPLETED | OUTPATIENT
Start: 2023-01-05 | End: 2023-01-05

## 2023-01-05 RX ORDER — FLUTICASONE PROPIONATE 50 MCG
1 SPRAY, SUSPENSION (ML) NASAL 2 TIMES DAILY
Qty: 16 G | Refills: 0 | Status: ON HOLD | OUTPATIENT
Start: 2023-01-05 | End: 2023-05-05 | Stop reason: HOSPADM

## 2023-01-05 RX ORDER — PREDNISONE 20 MG/1
40 TABLET ORAL DAILY
Qty: 10 TABLET | Refills: 0 | Status: ON HOLD | OUTPATIENT
Start: 2023-01-05 | End: 2023-05-05 | Stop reason: HOSPADM

## 2023-01-05 RX ORDER — BENZONATATE 100 MG/1
100 CAPSULE ORAL 3 TIMES DAILY PRN
Qty: 20 CAPSULE | Refills: 0 | Status: SHIPPED | OUTPATIENT
Start: 2023-01-05 | End: 2023-01-15

## 2023-01-05 RX ORDER — ACETAMINOPHEN 500 MG
500 TABLET ORAL EVERY 6 HOURS PRN
Qty: 30 TABLET | Refills: 0 | Status: SHIPPED | OUTPATIENT
Start: 2023-01-05

## 2023-01-05 RX ORDER — FAMOTIDINE 20 MG/1
20 TABLET, FILM COATED ORAL 2 TIMES DAILY
Qty: 20 TABLET | Refills: 0 | Status: ON HOLD | OUTPATIENT
Start: 2023-01-05 | End: 2023-05-03

## 2023-01-05 RX ORDER — LORATADINE 10 MG/1
10 TABLET ORAL DAILY
Qty: 60 TABLET | Refills: 0 | Status: ON HOLD | OUTPATIENT
Start: 2023-01-05 | End: 2023-05-03

## 2023-01-05 RX ADMIN — POTASSIUM BICARBONATE 25 MEQ: 977.5 TABLET, EFFERVESCENT ORAL at 03:01

## 2023-01-05 RX ADMIN — DEXTROSE 500 MG: 5 SOLUTION INTRAVENOUS at 01:01

## 2023-01-05 RX ADMIN — SODIUM CHLORIDE 1641 ML: 9 INJECTION, SOLUTION INTRAVENOUS at 01:01

## 2023-01-05 RX ADMIN — LEVALBUTEROL HYDROCHLORIDE 1.25 MG: 1.25 SOLUTION, CONCENTRATE RESPIRATORY (INHALATION) at 03:01

## 2023-01-05 RX ADMIN — METHYLPREDNISOLONE SODIUM SUCCINATE 125 MG: 125 INJECTION, POWDER, FOR SOLUTION INTRAMUSCULAR; INTRAVENOUS at 03:01

## 2023-01-05 RX ADMIN — LEVALBUTEROL HYDROCHLORIDE 1.25 MG: 1.25 SOLUTION, CONCENTRATE RESPIRATORY (INHALATION) at 12:01

## 2023-01-05 NOTE — ED PROVIDER NOTES
Encounter Date: 1/5/2023    SCRIBE #1 NOTE: I, Linh Ramos, am scribing for, and in the presence of,  Elaine Pham DO. I have scribed the following portions of the note - Other sections scribed: HPI; ROS; PE.     History     Chief Complaint   Patient presents with    Shortness of Breath     Pt states SOB x2 days Hx of bronchitis pt states her coughing is making her chest tight and heavy      Nyasia Frye is a 82 y.o. female with Hx of A-fib, Asthma, DM, HLD, HTN who presents to the ED for chief complaint of chest heaviness onset 1 week ago and shortness of breath and cough onset 1 month ago worsening 5 days ago. Patient rates the chest heaviness as a 9/10. She states she was prescribed a Z-Enmanuel on 12/10 for her cough. She states normally after 2 treatments she feels better, but she has increased the amount of times she uses her nebulizer machine with each treatment containing 0.63 mg per 3 mL of levalbuterol. Patient recorded the date and time of each treatment and are as follows: 1/02 at 6:30 AM and 12:30 PM, 1/03 at 7:19 AM, 1/04 8:30 AM, 4:15 PM, 10:15 PM, and 1/5 at 2:45 AM. She denies associated fever or chills. No further complaints at this time.  Patient is very concerned because she is taking frequent nebulizer treatments with persistent shortness of breath.      The history is provided by the patient. No  was used.   Review of patient's allergies indicates:   Allergen Reactions    Beta-adrenergic agents     Ciprofloxacin Other (See Comments)    Clindamycin      rash    Metformin     Penicillins     Verapamil     Zyloprim [allopurinol]      Past Medical History:   Diagnosis Date    *Atrial fibrillation     Allergy     Atrial fibrillation     Controlled gout     Diabetes mellitus type II     Hypercholesteremia     Hyperlipidemia     Hypertension     Osteoporosis, senile      Past Surgical History:   Procedure Laterality Date    CARDIAC DEFIBRILLATOR PLACEMENT      OOPHORECTOMY      TUBAL  LIGATION       Family History   Problem Relation Age of Onset    Breast cancer Sister     Colon cancer Sister     Ovarian cancer Neg Hx      Social History     Tobacco Use    Smoking status: Former    Smokeless tobacco: Never   Substance Use Topics    Alcohol use: No    Drug use: No     Review of Systems   Constitutional:  Negative for chills, fatigue and fever.   HENT:  Negative for congestion, ear pain, nosebleeds, postnasal drip, rhinorrhea, sinus pressure and sore throat.    Eyes:  Negative for photophobia and pain.   Respiratory:  Positive for cough, chest tightness and shortness of breath. Negative for apnea, choking, wheezing and stridor.    Cardiovascular:  Negative for chest pain, palpitations and leg swelling.   Gastrointestinal:  Negative for abdominal pain, constipation, diarrhea, nausea and vomiting.   Genitourinary:  Negative for dysuria, flank pain, hematuria, pelvic pain and vaginal discharge.   Musculoskeletal:  Negative for arthralgias, back pain, gait problem and neck pain.   Skin:  Negative for color change, pallor, rash and wound.   Neurological:  Negative for dizziness, seizures, syncope, facial asymmetry, light-headedness and headaches.   Hematological:  Negative for adenopathy.   Psychiatric/Behavioral:  Negative for confusion. The patient is not nervous/anxious.    All other systems reviewed and are negative.    Physical Exam     Initial Vitals [01/05/23 1134]   BP Pulse Resp Temp SpO2   134/87 70 (!) 22 98.7 °F (37.1 °C) 98 %      MAP       --         Physical Exam    Nursing note and vitals reviewed.  Constitutional: She appears well-developed and well-nourished. She appears distressed.   Speaking in 1 word sentences.    HENT:   Head: Normocephalic and atraumatic.   Right Ear: External ear normal.   Left Ear: External ear normal.   Eyes: Conjunctivae and EOM are normal. Pupils are equal, round, and reactive to light. Right eye exhibits no discharge. Left eye exhibits no discharge.   Neck:  Neck supple.   Normal range of motion.  Cardiovascular:  Normal rate, regular rhythm and intact distal pulses.     Exam reveals no gallop and no friction rub.       Murmur heard.  Pulmonary/Chest: No respiratory distress. She has wheezes (expiratory). She has no rhonchi. She has no rales. She exhibits no tenderness.   Abdominal: Abdomen is soft. Bowel sounds are normal. She exhibits no distension and no mass. There is no abdominal tenderness.   No CVA tenderness There is no rebound and no guarding.   Musculoskeletal:         General: No tenderness or edema. Normal range of motion.      Cervical back: Normal range of motion and neck supple.     Neurological: She is alert and oriented to person, place, and time.   Skin: Skin is warm and dry.   Psychiatric: She has a normal mood and affect.       ED Course   Procedures  Labs Reviewed   POCT URINALYSIS W/O SCOPE - Abnormal; Notable for the following components:       Result Value    Glucose, UA 3+ (*)     All other components within normal limits   POCT GLUCOSE - Abnormal; Notable for the following components:    POCT Glucose 125 (*)     All other components within normal limits   ISTAT PROCEDURE - Abnormal; Notable for the following components:    POC PTWBT 14.6 (*)     All other components within normal limits   ISTAT PROCEDURE - Abnormal; Notable for the following components:    POC PCO2 47.1 (*)     POC HCO3 29.0 (*)     POC SATURATED O2 80 (*)     POC TCO2 30 (*)     All other components within normal limits   POCT CMP - Abnormal; Notable for the following components:    POC Creatinine 1.4 (*)     POC Potassium 3.5 (*)     All other components within normal limits   POCT B-TYPE NATRIURETIC PEPTIDE (BNP) - Abnormal; Notable for the following components:    POC B-Type Natriuretic Peptide 262 (*)     All other components within normal limits   ISTAT PROCEDURE - Abnormal; Notable for the following components:    POC PCO2 53.1 (*)     POC PO2 15 (*)     POC HCO3 29.4  (*)     POC SATURATED O2 18 (*)     POC TCO2 31 (*)     All other components within normal limits   CULTURE, BLOOD   CULTURE, BLOOD   CULTURE, URINE   TROPONIN ISTAT   PROCALCITONIN   SARS-COV-2 RDRP GENE    Narrative:     This test utilizes isothermal nucleic acid amplification technology to detect the SARS-CoV-2 RdRp nucleic acid segment. The analytical sensitivity (limit of detection) is 500 copies/swab.     A POSITIVE result is indicative of the presence of SARS-CoV-2 RNA; clinical correlation with patient history and other diagnostic information is necessary to determine patient infection status.    A NEGATIVE result means that SARS-CoV-2 nucleic acids are not present above the limit of detection. A NEGATIVE result should be treated as presumptive. It does not rule out the possibility of COVID-19 and should not be the sole basis for treatment decisions. If COVID-19 is strongly suspected based on clinical and exposure history, re-testing using an alternate molecular assay should be considered.     This test is only for use under the Food and Drug Administration s Emergency Use Authorization (EUA).     Commercial kits are provided by [employer]. Performance characteristics of the EUA have been independently verified by [facility] Department of Pathology and Laboratory Medicine.   _________________________________________________________________   The authorized Fact Sheet for Healthcare Providers and the authorized Fact Sheet for Patients of the ID NOW COVID-19 are available on the FDA website:    https://www.fda.gov/media/637990/download      https://www.fda.gov/media/822689/download      POCT CBC   POCT URINALYSIS W/O SCOPE   POCT INFLUENZA A/B MOLECULAR   POCT GLUCOSE, HAND-HELD DEVICE   POCT CMP   POCT PROTIME-INR   POCT B-TYPE NATRIURETIC PEPTIDE (BNP)   POCT TROPONIN   POCT RAPID INFLUENZA A/B       EKG Readings: (Independently Interpreted)   No STEMI. Rate of 80. Normal Sinus Rhythm.  Left Axis. Abnormal EKG. QTc normal at 502. When compared to prior EKG dated 08/15/22 rate decreased by 1 bpm.        Imaging Results              X-Ray Chest AP Portable (Final result)  Result time 01/05/23 13:28:41      Final result by Kvng James Jr., MD (01/05/23 13:28:41)                   Impression:      No acute cardiopulmonary disease      Electronically signed by: Kvng York Jr  Date:    01/05/2023  Time:    13:28               Narrative:    EXAMINATION:  XR CHEST AP PORTABLE    CLINICAL HISTORY:  Sepsis;    TECHNIQUE:  Single frontal view of the chest was performed.    COMPARISON:  09/02/2022    FINDINGS:  Cardiac silhouette remains significantly enlarged.There is a multi lead biventricular left-sided pacemaker and tortuosity and atherosclerosis of the thoracic aorta.    Lungs grossly clear within limitations of portable technique    Pleura, diaphragm, and thoracic cage grossly unremarkable.                                       Medications   sodium chloride 0.9% bolus 1,641 mL 1,641 mL (0 mLs Intravenous Stopped 1/5/23 1605)   azithromycin (ZITHROMAX) 500 mg in dextrose 5 % 250 mL IVPB (0 mg Intravenous Stopped 1/5/23 1446)   levalbuterol nebulizer solution 1.25 mg (1.25 mg Nebulization Given 1/5/23 1253)   levalbuterol nebulizer solution 1.25 mg (1.25 mg Nebulization Given 1/5/23 1538)   methylPREDNISolone sodium succinate injection 125 mg (125 mg Intravenous Given 1/5/23 1534)   potassium bicarbonate disintegrating tablet 25 mEq (25 mEq Oral Given 1/5/23 1534)     Medical Decision Making:   History:   Old Medical Records: I decided to obtain old medical records.  Independently Interpreted Test(s):   I have ordered and independently interpreted X-rays - see prior notes.  I have ordered and independently interpreted EKG Reading(s) - see prior notes  Clinical Tests:   Lab Tests: Ordered and Reviewed  Radiological Study: Reviewed and Ordered  Medical Tests: Ordered and Reviewed     This is  an evaluation of a 82 y.o. female that presents to the Emergency Department for   Chief Complaint   Patient presents with    Shortness of Breath     Pt states SOB x2 days Hx of bronchitis pt states her coughing is making her chest tight and heavy      The patient is a non-toxic and well appearing patient. On physical exam, patient appears well hydrated with moist mucus membranes. Neck soft and supple with no meningeal signs or cervical lymphadenopathy. Breath sounds present with expiratory wheezes, with no tachypnea or respiratory distress with room air pulse ox of 98% and no evidence of hypoxia. TM's without infection.     Differential diagnosis: Viral illness, COVID, Influenza A, Influenza B, Hyperglycemia, Asthma, Asthma Exacerbation.     Vital Signs Are Reassuring. COVID-19: Negative. Flu: Negative.     ED Course:Treatment in the ED included Physical Exam and   Medications   sodium chloride 0.9% bolus 1,641 mL 1,641 mL (0 mLs Intravenous Stopped 1/5/23 1605)   azithromycin (ZITHROMAX) 500 mg in dextrose 5 % 250 mL IVPB (0 mg Intravenous Stopped 1/5/23 1446)   levalbuterol nebulizer solution 1.25 mg (1.25 mg Nebulization Given 1/5/23 1253)   levalbuterol nebulizer solution 1.25 mg (1.25 mg Nebulization Given 1/5/23 1538)   methylPREDNISolone sodium succinate injection 125 mg (125 mg Intravenous Given 1/5/23 1534)   potassium bicarbonate disintegrating tablet 25 mEq (25 mEq Oral Given 1/5/23 1534)     Cardiac monitor placed for chest heaviness. Monitor shows sinus rhythm. Interpreted by Dr. Pham    Patient reports feeling better after treatment in the ER.  Wheezing resolved after Xopenex treatment.  CBC normal at 5.7, hemoglobin normal at 12.8, hematocrit normal at 39.1.  Troponin is 0.03.  BNP is 262.  PH is 7.398.  Lactic acid is not elevated at 1.16.  Potassium is low at 3.5.  Will give oral replacement.  BUN is 22 and creatinine is 1.4.  UA positive for glucose negative for leukocytes and ketones.  COVID  negative, flu negative, chest x-ray no acute process appreciated.  Discussed treatment, prescriptions, labs, and imaging results with the patient.    Discharge home with   ED Prescriptions       Medication Sig Dispense Start Date End Date Auth. Provider    levalbuterol (XOPENEX) 1.25 mg/3 mL nebulizer solution Take 3 mLs (1.25 mg total) by nebulization every 6 (six) hours as needed for Wheezing. Rescue 30 each 1/5/2023 1/5/2024 Elaine Pham DO    predniSONE (DELTASONE) 20 MG tablet Take 2 tablets (40 mg total) by mouth once daily. for 5 days 10 tablet 1/5/2023 1/10/2023 Elaine Pham DO    benzonatate (TESSALON) 100 MG capsule Take 1 capsule (100 mg total) by mouth 3 (three) times daily as needed for Cough. 20 capsule 1/5/2023 1/15/2023 Elaine Pham DO    fluticasone propionate (FLONASE) 50 mcg/actuation nasal spray 1 spray (50 mcg total) by Each Nostril route 2 (two) times daily. 16 g 1/5/2023 -- Elaine Pham DO    loratadine (CLARITIN) 10 mg tablet Take 1 tablet (10 mg total) by mouth once daily. 60 tablet 1/5/2023 1/5/2024 Elaine Pham DO    acetaminophen (TYLENOL) 500 MG tablet Take 1 tablet (500 mg total) by mouth every 6 (six) hours as needed for Pain (and fever). 30 tablet 1/5/2023 -- Elaine Pham DO    famotidine (PEPCID) 20 MG tablet Take 1 tablet (20 mg total) by mouth 2 (two) times daily. 20 tablet 1/5/2023 1/5/2024 Elaine Pham DO          Fill and take prescriptions as directed.  Return to the ED if symptoms worsen or do not resolve.   Answered questions and discussed discharge plan.    Patient feels better and is ready for discharge.  Follow up with PCP/specialist in 1 day       Scribe Attestation:   Scribe #1: I performed the above scribed service and the documentation accurately describes the services I performed. I attest to the accuracy of the note.             I, Dr. Elaine Pham, personally performed the services described in this documentation. This document was produced by a scribe under my  direction and in my presence. All medical record entries made by the scribe were at my direction and in my presence.  I have reviewed the chart and agree that the record reflects my personal performance and is accurate and complete. Elaine Pham DO.     01/05/2023 3:29 PM         Clinical Impression:   Final diagnoses:  [J44.1] COPD with acute exacerbation (Primary)  [J40] Bronchitis  [E87.6] Hypokalemia        ED Disposition Condition    Discharge Stable          ED Prescriptions       Medication Sig Dispense Start Date End Date Auth. Provider    levalbuterol (XOPENEX) 1.25 mg/3 mL nebulizer solution Take 3 mLs (1.25 mg total) by nebulization every 6 (six) hours as needed for Wheezing. Rescue 30 each 1/5/2023 1/5/2024 Elaine Pham DO    predniSONE (DELTASONE) 20 MG tablet Take 2 tablets (40 mg total) by mouth once daily. for 5 days 10 tablet 1/5/2023 1/10/2023 Elaine Pham DO    benzonatate (TESSALON) 100 MG capsule Take 1 capsule (100 mg total) by mouth 3 (three) times daily as needed for Cough. 20 capsule 1/5/2023 1/15/2023 Elaine Pham DO    fluticasone propionate (FLONASE) 50 mcg/actuation nasal spray 1 spray (50 mcg total) by Each Nostril route 2 (two) times daily. 16 g 1/5/2023 -- Elaine Pham DO    loratadine (CLARITIN) 10 mg tablet Take 1 tablet (10 mg total) by mouth once daily. 60 tablet 1/5/2023 1/5/2024 Elaine Pham DO    acetaminophen (TYLENOL) 500 MG tablet Take 1 tablet (500 mg total) by mouth every 6 (six) hours as needed for Pain (and fever). 30 tablet 1/5/2023 -- Elaine Pham DO    famotidine (PEPCID) 20 MG tablet Take 1 tablet (20 mg total) by mouth 2 (two) times daily. 20 tablet 1/5/2023 1/5/2024 Elaine Pham DO          Follow-up Information       Follow up With Specialties Details Why Contact Info    Marianne Padilla MD Internal Medicine Schedule an appointment as soon as possible for a visit   [address] [place] [address]  [id number]  [place] [id number]  [phone]      [facility] -  Emergency Dept Emergency Medicine Go to  Please go to [facility] emergency department if symptoms worsen [address]  [facility] [id number]  [phone]             Elaine Pham DO  01/05/23 1611

## 2023-01-07 LAB — BACTERIA UR CULT: NORMAL

## 2023-01-09 LAB
BACTERIA BLD CULT: NORMAL
BACTERIA BLD CULT: NORMAL

## 2023-03-28 ENCOUNTER — HOSPITAL ENCOUNTER (OUTPATIENT)
Facility: HOSPITAL | Age: 83
Discharge: HOME | End: 2023-03-30
Attending: EMERGENCY MEDICINE | Admitting: INTERNAL MEDICINE
Payer: MEDICARE

## 2023-03-28 DIAGNOSIS — R06.02 SHORTNESS OF BREATH: ICD-10-CM

## 2023-03-28 DIAGNOSIS — R06.00 DYSPNEA, UNSPECIFIED TYPE: Primary | ICD-10-CM

## 2023-03-28 DIAGNOSIS — R07.9 CHEST PAIN: ICD-10-CM

## 2023-03-28 DIAGNOSIS — R06.02 SOB (SHORTNESS OF BREATH): ICD-10-CM

## 2023-03-28 DIAGNOSIS — I50.33 ACUTE ON CHRONIC DIASTOLIC HEART FAILURE: ICD-10-CM

## 2023-03-28 PROBLEM — N30.00 ACUTE CYSTITIS WITHOUT HEMATURIA: Status: ACTIVE | Noted: 2023-03-28

## 2023-03-28 PROBLEM — N17.9 AKI (ACUTE KIDNEY INJURY): Status: ACTIVE | Noted: 2023-03-28

## 2023-03-28 LAB
ALBUMIN SERPL BCP-MCNC: 4 G/DL (ref 3.5–5.2)
ALP SERPL-CCNC: 85 U/L (ref 55–135)
ALT SERPL W/O P-5'-P-CCNC: 20 U/L (ref 10–44)
ANION GAP SERPL CALC-SCNC: 10 MMOL/L (ref 8–16)
AST SERPL-CCNC: 28 U/L (ref 10–40)
BACTERIA #/AREA URNS AUTO: ABNORMAL /HPF
BACTERIA #/AREA URNS AUTO: ABNORMAL /HPF
BASOPHILS # BLD AUTO: 0.03 K/UL (ref 0–0.2)
BASOPHILS NFR BLD: 0.4 % (ref 0–1.9)
BILIRUB SERPL-MCNC: 1.3 MG/DL (ref 0.1–1)
BILIRUB UR QL STRIP: NEGATIVE
BILIRUB UR QL STRIP: NEGATIVE
BNP SERPL-MCNC: 242 PG/ML (ref 0–99)
BUN SERPL-MCNC: 24 MG/DL (ref 8–23)
BUN SERPL-MCNC: 26 MG/DL (ref 6–30)
CALCIUM SERPL-MCNC: 10 MG/DL (ref 8.7–10.5)
CHLORIDE SERPL-SCNC: 97 MMOL/L (ref 95–110)
CHLORIDE SERPL-SCNC: 99 MMOL/L (ref 95–110)
CLARITY UR REFRACT.AUTO: CLEAR
CLARITY UR REFRACT.AUTO: CLEAR
CO2 SERPL-SCNC: 29 MMOL/L (ref 23–29)
COLOR UR AUTO: YELLOW
COLOR UR AUTO: YELLOW
CREAT SERPL-MCNC: 1.5 MG/DL (ref 0.5–1.4)
CREAT SERPL-MCNC: 1.5 MG/DL (ref 0.5–1.4)
DIFFERENTIAL METHOD: ABNORMAL
EOSINOPHIL # BLD AUTO: 0.1 K/UL (ref 0–0.5)
EOSINOPHIL NFR BLD: 1.5 % (ref 0–8)
ERYTHROCYTE [DISTWIDTH] IN BLOOD BY AUTOMATED COUNT: 14.6 % (ref 11.5–14.5)
EST. GFR  (NO RACE VARIABLE): 34.4 ML/MIN/1.73 M^2
GLUCOSE SERPL-MCNC: 85 MG/DL (ref 70–110)
GLUCOSE SERPL-MCNC: 85 MG/DL (ref 70–110)
GLUCOSE UR QL STRIP: ABNORMAL
GLUCOSE UR QL STRIP: ABNORMAL
HCT VFR BLD AUTO: 43 % (ref 37–48.5)
HCT VFR BLD CALC: 42 %PCV (ref 36–54)
HCV AB SERPL QL IA: NORMAL
HGB BLD-MCNC: 13.7 G/DL (ref 12–16)
HGB UR QL STRIP: NEGATIVE
HGB UR QL STRIP: NEGATIVE
HIV 1+2 AB+HIV1 P24 AG SERPL QL IA: NORMAL
IMM GRANULOCYTES # BLD AUTO: 0.02 K/UL (ref 0–0.04)
IMM GRANULOCYTES NFR BLD AUTO: 0.3 % (ref 0–0.5)
KETONES UR QL STRIP: NEGATIVE
KETONES UR QL STRIP: NEGATIVE
LEUKOCYTE ESTERASE UR QL STRIP: ABNORMAL
LEUKOCYTE ESTERASE UR QL STRIP: ABNORMAL
LIPASE SERPL-CCNC: 18 U/L (ref 4–60)
LYMPHOCYTES # BLD AUTO: 2 K/UL (ref 1–4.8)
LYMPHOCYTES NFR BLD: 29.2 % (ref 18–48)
MCH RBC QN AUTO: 29.7 PG (ref 27–31)
MCHC RBC AUTO-ENTMCNC: 31.9 G/DL (ref 32–36)
MCV RBC AUTO: 93 FL (ref 82–98)
MICROSCOPIC COMMENT: ABNORMAL
MICROSCOPIC COMMENT: ABNORMAL
MONOCYTES # BLD AUTO: 0.5 K/UL (ref 0.3–1)
MONOCYTES NFR BLD: 7.9 % (ref 4–15)
NEUTROPHILS # BLD AUTO: 4.1 K/UL (ref 1.8–7.7)
NEUTROPHILS NFR BLD: 60.7 % (ref 38–73)
NITRITE UR QL STRIP: NEGATIVE
NITRITE UR QL STRIP: NEGATIVE
NRBC BLD-RTO: 0 /100 WBC
PH UR STRIP: 7 [PH] (ref 5–8)
PH UR STRIP: 7 [PH] (ref 5–8)
PLATELET # BLD AUTO: 212 K/UL (ref 150–450)
PMV BLD AUTO: 9.6 FL (ref 9.2–12.9)
POC IONIZED CALCIUM: 1.19 MMOL/L (ref 1.06–1.42)
POC TCO2 (MEASURED): 29 MMOL/L (ref 23–29)
POCT GLUCOSE: 113 MG/DL (ref 70–110)
POTASSIUM BLD-SCNC: 3.7 MMOL/L (ref 3.5–5.1)
POTASSIUM SERPL-SCNC: 3.8 MMOL/L (ref 3.5–5.1)
PROT SERPL-MCNC: 6.8 G/DL (ref 6–8.4)
PROT UR QL STRIP: NEGATIVE
PROT UR QL STRIP: NEGATIVE
RBC # BLD AUTO: 4.62 M/UL (ref 4–5.4)
RBC #/AREA URNS AUTO: 0 /HPF (ref 0–4)
RBC #/AREA URNS AUTO: 1 /HPF (ref 0–4)
SAMPLE: ABNORMAL
SODIUM BLD-SCNC: 136 MMOL/L (ref 136–145)
SODIUM SERPL-SCNC: 138 MMOL/L (ref 136–145)
SP GR UR STRIP: >1.03 (ref 1–1.03)
SP GR UR STRIP: >1.03 (ref 1–1.03)
SQUAMOUS #/AREA URNS AUTO: 3 /HPF
SQUAMOUS #/AREA URNS AUTO: 6 /HPF
TROPONIN I SERPL DL<=0.01 NG/ML-MCNC: 0.05 NG/ML (ref 0–0.03)
TROPONIN I SERPL DL<=0.01 NG/ML-MCNC: 0.05 NG/ML (ref 0–0.03)
URN SPEC COLLECT METH UR: ABNORMAL
URN SPEC COLLECT METH UR: ABNORMAL
WBC # BLD AUTO: 6.81 K/UL (ref 3.9–12.7)
WBC #/AREA URNS AUTO: 23 /HPF (ref 0–5)
WBC #/AREA URNS AUTO: 6 /HPF (ref 0–5)
YEAST UR QL AUTO: ABNORMAL
YEAST UR QL AUTO: ABNORMAL

## 2023-03-28 PROCEDURE — 80047 BASIC METABLC PNL IONIZED CA: CPT

## 2023-03-28 PROCEDURE — 99223 PR INITIAL HOSPITAL CARE,LEVL III: ICD-10-PCS | Mod: ,,, | Performed by: PHYSICIAN ASSISTANT

## 2023-03-28 PROCEDURE — 84484 ASSAY OF TROPONIN QUANT: CPT | Performed by: EMERGENCY MEDICINE

## 2023-03-28 PROCEDURE — 25000003 PHARM REV CODE 250: Performed by: PHYSICIAN ASSISTANT

## 2023-03-28 PROCEDURE — 87086 URINE CULTURE/COLONY COUNT: CPT | Performed by: PHYSICIAN ASSISTANT

## 2023-03-28 PROCEDURE — 93010 ELECTROCARDIOGRAM REPORT: CPT | Mod: ,,, | Performed by: INTERNAL MEDICINE

## 2023-03-28 PROCEDURE — 81001 URINALYSIS AUTO W/SCOPE: CPT | Mod: 91

## 2023-03-28 PROCEDURE — 63600175 PHARM REV CODE 636 W HCPCS

## 2023-03-28 PROCEDURE — 96375 TX/PRO/DX INJ NEW DRUG ADDON: CPT

## 2023-03-28 PROCEDURE — 99285 EMERGENCY DEPT VISIT HI MDM: CPT | Mod: ,,, | Performed by: EMERGENCY MEDICINE

## 2023-03-28 PROCEDURE — 82330 ASSAY OF CALCIUM: CPT

## 2023-03-28 PROCEDURE — 80053 COMPREHEN METABOLIC PANEL: CPT

## 2023-03-28 PROCEDURE — 87088 URINE BACTERIA CULTURE: CPT | Performed by: PHYSICIAN ASSISTANT

## 2023-03-28 PROCEDURE — 63600175 PHARM REV CODE 636 W HCPCS: Performed by: EMERGENCY MEDICINE

## 2023-03-28 PROCEDURE — 87389 HIV-1 AG W/HIV-1&-2 AB AG IA: CPT | Performed by: PHYSICIAN ASSISTANT

## 2023-03-28 PROCEDURE — 93010 EKG 12-LEAD: ICD-10-PCS | Mod: ,,, | Performed by: INTERNAL MEDICINE

## 2023-03-28 PROCEDURE — G0378 HOSPITAL OBSERVATION PER HR: HCPCS

## 2023-03-28 PROCEDURE — 25500020 PHARM REV CODE 255: Performed by: EMERGENCY MEDICINE

## 2023-03-28 PROCEDURE — 86803 HEPATITIS C AB TEST: CPT | Performed by: PHYSICIAN ASSISTANT

## 2023-03-28 PROCEDURE — 85025 COMPLETE CBC W/AUTO DIFF WBC: CPT

## 2023-03-28 PROCEDURE — 87147 CULTURE TYPE IMMUNOLOGIC: CPT | Performed by: PHYSICIAN ASSISTANT

## 2023-03-28 PROCEDURE — 83690 ASSAY OF LIPASE: CPT

## 2023-03-28 PROCEDURE — 83880 ASSAY OF NATRIURETIC PEPTIDE: CPT | Performed by: EMERGENCY MEDICINE

## 2023-03-28 PROCEDURE — 96365 THER/PROPH/DIAG IV INF INIT: CPT | Mod: 59

## 2023-03-28 PROCEDURE — 84484 ASSAY OF TROPONIN QUANT: CPT | Mod: 91 | Performed by: PHYSICIAN ASSISTANT

## 2023-03-28 PROCEDURE — 99223 1ST HOSP IP/OBS HIGH 75: CPT | Mod: ,,, | Performed by: PHYSICIAN ASSISTANT

## 2023-03-28 PROCEDURE — 84484 ASSAY OF TROPONIN QUANT: CPT | Mod: 91 | Performed by: EMERGENCY MEDICINE

## 2023-03-28 PROCEDURE — 93005 ELECTROCARDIOGRAM TRACING: CPT

## 2023-03-28 PROCEDURE — 94761 N-INVAS EAR/PLS OXIMETRY MLT: CPT

## 2023-03-28 PROCEDURE — 99285 PR EMERGENCY DEPT VISIT,LEVEL V: ICD-10-PCS | Mod: ,,, | Performed by: EMERGENCY MEDICINE

## 2023-03-28 PROCEDURE — 36415 COLL VENOUS BLD VENIPUNCTURE: CPT | Performed by: PHYSICIAN ASSISTANT

## 2023-03-28 PROCEDURE — 99285 EMERGENCY DEPT VISIT HI MDM: CPT | Mod: 25

## 2023-03-28 PROCEDURE — 81001 URINALYSIS AUTO W/SCOPE: CPT | Performed by: PHYSICIAN ASSISTANT

## 2023-03-28 RX ORDER — BUSPIRONE HYDROCHLORIDE 5 MG/1
5 TABLET ORAL 2 TIMES DAILY
Status: DISCONTINUED | OUTPATIENT
Start: 2023-03-29 | End: 2023-03-30 | Stop reason: HOSPADM

## 2023-03-28 RX ORDER — ONDANSETRON 2 MG/ML
4 INJECTION INTRAMUSCULAR; INTRAVENOUS EVERY 8 HOURS PRN
Status: DISCONTINUED | OUTPATIENT
Start: 2023-03-28 | End: 2023-03-30 | Stop reason: HOSPADM

## 2023-03-28 RX ORDER — ATORVASTATIN CALCIUM 40 MG/1
40 TABLET, FILM COATED ORAL DAILY
Status: DISCONTINUED | OUTPATIENT
Start: 2023-03-29 | End: 2023-03-30 | Stop reason: HOSPADM

## 2023-03-28 RX ORDER — PROCHLORPERAZINE EDISYLATE 5 MG/ML
5 INJECTION INTRAMUSCULAR; INTRAVENOUS EVERY 6 HOURS PRN
Status: DISCONTINUED | OUTPATIENT
Start: 2023-03-28 | End: 2023-03-30 | Stop reason: HOSPADM

## 2023-03-28 RX ORDER — ATORVASTATIN CALCIUM 40 MG/1
40 TABLET, FILM COATED ORAL
COMMUNITY
Start: 2023-02-27

## 2023-03-28 RX ORDER — POLYETHYLENE GLYCOL 3350 17 G/17G
17 POWDER, FOR SOLUTION ORAL DAILY PRN
Status: DISCONTINUED | OUTPATIENT
Start: 2023-03-28 | End: 2023-03-30 | Stop reason: HOSPADM

## 2023-03-28 RX ORDER — SODIUM CHLORIDE 0.9 % (FLUSH) 0.9 %
10 SYRINGE (ML) INJECTION
Status: DISCONTINUED | OUTPATIENT
Start: 2023-03-28 | End: 2023-03-30 | Stop reason: HOSPADM

## 2023-03-28 RX ORDER — BISACODYL 10 MG
10 SUPPOSITORY, RECTAL RECTAL DAILY PRN
Status: DISCONTINUED | OUTPATIENT
Start: 2023-03-28 | End: 2023-03-30 | Stop reason: HOSPADM

## 2023-03-28 RX ORDER — GLUCAGON 1 MG
1 KIT INJECTION
Status: DISCONTINUED | OUTPATIENT
Start: 2023-03-28 | End: 2023-03-30 | Stop reason: HOSPADM

## 2023-03-28 RX ORDER — MAG HYDROX/ALUMINUM HYD/SIMETH 200-200-20
30 SUSPENSION, ORAL (FINAL DOSE FORM) ORAL EVERY 6 HOURS PRN
Status: DISCONTINUED | OUTPATIENT
Start: 2023-03-29 | End: 2023-03-30 | Stop reason: HOSPADM

## 2023-03-28 RX ORDER — TALC
6 POWDER (GRAM) TOPICAL NIGHTLY PRN
Status: DISCONTINUED | OUTPATIENT
Start: 2023-03-28 | End: 2023-03-30 | Stop reason: HOSPADM

## 2023-03-28 RX ORDER — FUROSEMIDE 10 MG/ML
60 INJECTION INTRAMUSCULAR; INTRAVENOUS ONCE
Status: COMPLETED | OUTPATIENT
Start: 2023-03-28 | End: 2023-03-28

## 2023-03-28 RX ORDER — LOSARTAN POTASSIUM 50 MG/1
100 TABLET ORAL DAILY
Status: DISCONTINUED | OUTPATIENT
Start: 2023-03-29 | End: 2023-03-28

## 2023-03-28 RX ORDER — FUROSEMIDE 10 MG/ML
40 INJECTION INTRAMUSCULAR; INTRAVENOUS
Status: DISCONTINUED | OUTPATIENT
Start: 2023-03-29 | End: 2023-03-30

## 2023-03-28 RX ORDER — CETIRIZINE HYDROCHLORIDE 10 MG/1
10 TABLET ORAL DAILY
Status: DISCONTINUED | OUTPATIENT
Start: 2023-03-29 | End: 2023-03-30 | Stop reason: HOSPADM

## 2023-03-28 RX ORDER — ONDANSETRON 2 MG/ML
4 INJECTION INTRAMUSCULAR; INTRAVENOUS
Status: COMPLETED | OUTPATIENT
Start: 2023-03-28 | End: 2023-03-28

## 2023-03-28 RX ORDER — DICYCLOMINE HYDROCHLORIDE 10 MG/1
10 CAPSULE ORAL 3 TIMES DAILY
Status: DISCONTINUED | OUTPATIENT
Start: 2023-03-29 | End: 2023-03-30 | Stop reason: HOSPADM

## 2023-03-28 RX ORDER — INSULIN ASPART 100 [IU]/ML
0-5 INJECTION, SOLUTION INTRAVENOUS; SUBCUTANEOUS
Status: DISCONTINUED | OUTPATIENT
Start: 2023-03-28 | End: 2023-03-30 | Stop reason: HOSPADM

## 2023-03-28 RX ORDER — ACETAMINOPHEN 325 MG/1
650 TABLET ORAL EVERY 4 HOURS PRN
Status: DISCONTINUED | OUTPATIENT
Start: 2023-03-28 | End: 2023-03-30 | Stop reason: HOSPADM

## 2023-03-28 RX ORDER — DEXTROSE 40 %
15 GEL (GRAM) ORAL
Status: DISCONTINUED | OUTPATIENT
Start: 2023-03-28 | End: 2023-03-30 | Stop reason: HOSPADM

## 2023-03-28 RX ORDER — FAMOTIDINE 20 MG/1
20 TABLET, FILM COATED ORAL DAILY
Status: DISCONTINUED | OUTPATIENT
Start: 2023-03-29 | End: 2023-03-30 | Stop reason: HOSPADM

## 2023-03-28 RX ORDER — DEXTROSE 40 %
30 GEL (GRAM) ORAL
Status: DISCONTINUED | OUTPATIENT
Start: 2023-03-28 | End: 2023-03-30 | Stop reason: HOSPADM

## 2023-03-28 RX ADMIN — APIXABAN 2.5 MG: 2.5 TABLET, FILM COATED ORAL at 11:03

## 2023-03-28 RX ADMIN — IOHEXOL 100 ML: 350 INJECTION, SOLUTION INTRAVENOUS at 04:03

## 2023-03-28 RX ADMIN — ONDANSETRON 4 MG: 2 INJECTION INTRAMUSCULAR; INTRAVENOUS at 05:03

## 2023-03-28 RX ADMIN — CEFTRIAXONE 1 G: 1 INJECTION, POWDER, FOR SOLUTION INTRAMUSCULAR; INTRAVENOUS at 11:03

## 2023-03-28 RX ADMIN — FUROSEMIDE 60 MG: 10 INJECTION, SOLUTION INTRAMUSCULAR; INTRAVENOUS at 07:03

## 2023-03-28 NOTE — ED NOTES
Pt reports abd cramping, weakness, SOB. Reports progressively worsening SOB x 2 weeks, worse with exertion. Also reports dizziness x 1 week. Denies syncope, N/V, fever, chills, dysuria.

## 2023-03-28 NOTE — ED PROVIDER NOTES
"Encounter Date: 3/28/2023       History     Chief Complaint   Patient presents with    Shortness of Breath    Abdominal Pain     Hx chf, ibs     Ms. Frye is a 83 YOF with IBS, bronchitis, HFpEF (EF= 50-55%), A-fib, Asthma, DM, HLD, HTN coming in for abdominal pain. Described as "piecing, sharp, cramping, and traveling like gas", located in her abdomen and back worst in the bilateral lower quadrents. Intensity is a 10/10. Exacerbated with movement. This is a chronic issue which has been exacerbated in the past 3 weeks, pain is intermittent lasting for days. Pt takes mylanta and which remits the pain. Pt used to take bentyl for IBS but stopped taking 1 mos prior after a recent ED visit for a similar reason. No exacerbating factors. No postprandial pain, no heartburn. Pt has chronic diarrhea with typically 5 BM per day. No recent change in diet, no travel, no sick contacts. Pt was told to take levsin but this medication is unfamiliar to the patient.     ROS positive: chest tightness, dizziness, rhinitis, anxiety, diarrhea, cough (chronic)  ROS negative: for LOC, fevers, N/V, UTI symptoms, no melena, CP, palpitations.     The history is provided by the patient.   Review of patient's allergies indicates:   Allergen Reactions    Beta-adrenergic agents     Ciprofloxacin Other (See Comments)    Clindamycin      rash    Metformin     Penicillins      Eye swelling     Verapamil     Zyloprim [allopurinol]      Past Medical History:   Diagnosis Date    *Atrial fibrillation     Allergy     Atrial fibrillation     Controlled gout     Diabetes mellitus type II     Hypercholesteremia     Hyperlipidemia     Hypertension     Irritable bowel syndrome (IBS)     Osteoporosis, senile      Past Surgical History:   Procedure Laterality Date    CARDIAC DEFIBRILLATOR PLACEMENT      OOPHORECTOMY      TUBAL LIGATION       Family History   Problem Relation Age of Onset    Breast cancer Sister     Colon cancer Sister     Ovarian cancer Neg Hx  "     Social History     Tobacco Use    Smoking status: Former     Types: Cigarettes    Smokeless tobacco: Never   Substance Use Topics    Alcohol use: No    Drug use: No     Review of Systems    Physical Exam     Initial Vitals [03/28/23 1246]   BP Pulse Resp Temp SpO2   (!) 196/84 80 (!) 32 99 °F (37.2 °C) 96 %      MAP       --         Physical Exam    Constitutional: She appears well-developed. She appears distressed.   HENT:   Head: Normocephalic and atraumatic.   Eyes: Right eye exhibits no discharge. Left eye exhibits no discharge.   Neck: Neck supple.   Normal range of motion.  Cardiovascular:  Normal rate and regular rhythm.           Pulmonary/Chest: Breath sounds normal.   Abdominal: Abdomen is soft. Bowel sounds are normal. There is abdominal tenderness (tenderness throughout abdomen, worse in bilateral lower quadrents).   Musculoskeletal:      Cervical back: Normal range of motion and neck supple.     Neurological: She is oriented to person, place, and time.   Skin: Skin is warm and dry.   Psychiatric: She has a normal mood and affect.       ED Course   Procedures  Labs Reviewed   CBC W/ AUTO DIFFERENTIAL - Abnormal; Notable for the following components:       Result Value    MCHC 31.9 (*)     RDW 14.6 (*)     All other components within normal limits   COMPREHENSIVE METABOLIC PANEL - Abnormal; Notable for the following components:    BUN 24 (*)     Creatinine 1.5 (*)     Total Bilirubin 1.3 (*)     eGFR 34.4 (*)     All other components within normal limits   TROPONIN I - Abnormal; Notable for the following components:    Troponin I 0.046 (*)     All other components within normal limits   B-TYPE NATRIURETIC PEPTIDE - Abnormal; Notable for the following components:     (*)     All other components within normal limits   URINALYSIS, REFLEX TO URINE CULTURE - Abnormal; Notable for the following components:    Specific Gravity, UA >1.030 (*)     Glucose, UA 4+ (*)     Leukocytes, UA 2+ (*)     All  other components within normal limits    Narrative:     Specimen Source->Urine   TROPONIN I - Abnormal; Notable for the following components:    Troponin I 0.049 (*)     All other components within normal limits   URINALYSIS MICROSCOPIC - Abnormal; Notable for the following components:    WBC, UA 6 (*)     All other components within normal limits    Narrative:     Specimen Source->Urine   ISTAT PROCEDURE - Abnormal; Notable for the following components:    POC Creatinine 1.5 (*)     All other components within normal limits   HIV 1 / 2 ANTIBODY    Narrative:     Release to patient->Immediate   HEPATITIS C ANTIBODY    Narrative:     Release to patient->Immediate   LIPASE   POCT GLUCOSE, HAND-HELD DEVICE   ISTAT CHEM8        ECG Results              EKG 12-lead (Final result)  Result time 03/28/23 17:12:42      Final result by Interface, Lab In [facility] (03/28/23 17:12:42)                   Narrative:    Test Reason : R06.02,    Vent. Rate : 071 BPM     Atrial Rate : 063 BPM     P-R Int : 000 ms          QRS Dur : 128 ms      QT Int : 460 ms       P-R-T Axes : 000 268 089 degrees     QTc Int : 499 ms    Ventricular paced rhythm  Abnormal ECG  When compared with ECG of 28-MAR-2023 12:49,  No significant change was found  Confirmed by Ruperto Connolly MD ([id number]) on 3/28/2023 5:12:34 PM    Referred By: [email or web address]   SELF           Confirmed By:Ruperto Connolly MD                                     EKG 12-lead (Final result)  Result time 03/28/23 15:13:37      Final result by Interface, Lab In [facility] (03/28/23 15:13:37)                   Narrative:    Test Reason : R06.02,    Vent. Rate : 080 BPM     Atrial Rate : 083 BPM     P-R Int : 000 ms          QRS Dur : 130 ms      QT Int : 416 ms       P-R-T Axes : 000 -78 083 degrees     QTc Int : 479 ms    Ventricular-paced rhythm with occasional Premature ventricular complexes  Biventricular pacemaker detected  Abnormal ECG  When compared with ECG of 05-JAN-2023 11:40,  No  significant change was found  Confirmed by Christy Griggs MD ([id number]) on 3/28/2023 3:13:30 PM    Referred By: [email or web address]   SELF           Confirmed By:Christy Griggs MD                                  Imaging Results              CT Abdomen Pelvis With Contrast (Final result)  Result time 03/28/23 17:25:28      Final result by Kareem Ross MD (03/28/23 17:25:28)                   Impression:      1. Findings suggesting hepatic steatosis, correlation with LFTs recommended.  2. Moderate aortoiliac calcifications including calcifications at the left renal artery origin contributing to mild stenosis.  3. Cardiomegaly.  4. Colonic diverticulosis without diverticulitis.  5. Several additional findings above.    Electronically signed by resident: Christos Medeiros  Date:    03/28/2023  Time:    16:58    Electronically signed by: Kareem Ross MD  Date:    03/28/2023  Time:    17:25               Narrative:    EXAMINATION:  CT ABDOMEN PELVIS WITH CONTRAST    CLINICAL HISTORY:  Abdominal pain, acute, nonlocalized;    TECHNIQUE:  Axial images of the abdomen and pelvis were acquired  after the use of 100 cc Qerf168 IV contrast.  Coronal and sagittal reconstructions were also obtained.    COMPARISON:  XR chest 03/28/2023    FINDINGS:  HEART: Enlarged in size. No pericardial effusion.  Pacer wires noted.    LUNGS: There is bilateral basilar subsegmental atelectasis.    LIVER: The liver is hypoattenuating, suggesting steatosis, correlation with LFTs recommended.  There is a subcentimeter low attenuating lesion within the posterior aspect of the right hepatic lobe, too small for characterization.  There is a triangular focus of arterial enhancement within the anterior aspect of the right hepatic lobe, suggesting perfusion anomaly.    GALLBLADDER: The gallbladder is mildly distended without secondary findings to suggest acute cholecystitis.    BILE DUCTS: No evidence of dilated ducts.    PANCREAS: There is atrophic change of the  pancreas without pancreatic ductal dilation.    SPLEEN: Accessory splenule.    ADRENALS: Unremarkable.    KIDNEYS/URETERS: The kidneys enhance symmetrically.  Scattered punctate hypoattenuating lesions within bilateral kidneys, too small to characterize.  No hydronephrosis or nephrolithiasis. No ureteral dilatation.    BLADDER: No evidence of wall thickening.    REPRODUCTIVE ORGANS: Uterus is unremarkable.  The adnexa is unremarkable.    STOMACH/DUODENUM: Unremarkable.    BOWEL/MESENTERY: Small and large bowel are normal in caliber with no evidence of obstruction. No evidence of inflammation or wall thickening.  Appendix is unremarkable.  Scattered diverticula throughout the colon without inflammation.    PERITONEUM: No free fluid.  No pneumoperitoneum.    LYMPH NODES: No pathologic lymphadenopathy.    VASCULATURE: No aneurysm. Moderate aortoiliac calcific atherosclerosis.  Calcifications at the left renal artery origin contributing to mild stenosis.    ABDOMINAL WALL:  Unremarkable.    BONES: There is osteopenia.  Degenerative changes of the visualized spine, including grade 1 anterolisthesis at L4-L5.  No acute fracture. No suspicious osseous lesions.                                       X-Ray Chest AP Portable (Final result)  Result time 03/28/23 15:27:20      Final result by Kareem Ross MD (03/28/23 15:27:20)                   Impression:      1. No acute cardiopulmonary process, interstitial findings are accentuated by habitus.      Electronically signed by: Kareem Ross MD  Date:    03/28/2023  Time:    15:27               Narrative:    EXAMINATION:  XR CHEST AP PORTABLE    CLINICAL HISTORY:  Shortness of breath    TECHNIQUE:  Single frontal view of the chest was performed.    COMPARISON:  01/05/2023    FINDINGS:  Left chest wall pacer noted.  The cardiomediastinal silhouette is prominent, similar to the previous exam..  There is no pleural effusion.  The trachea is midline.  The lungs are  symmetrically expanded bilaterally with minimally coarse central hilar interstitial attenuation accentuated by habitus..  No large focal consolidation seen.  There is no pneumothorax.  The osseous structures are remarkable for degenerative changes..                                       Medications   sodium chloride 0.9% flush 10 mL (has no administration in time range)   melatonin tablet 6 mg (has no administration in time range)   polyethylene glycol packet 17 g (has no administration in time range)   bisacodyL suppository 10 mg (has no administration in time range)   acetaminophen tablet 650 mg (has no administration in time range)   glucagon (human recombinant) injection 1 mg (has no administration in time range)   dextrose 10% bolus 125 mL 125 mL (has no administration in time range)   dextrose 10% bolus 250 mL 250 mL (has no administration in time range)   dextrose 40 % gel 15,000 mg (has no administration in time range)   dextrose 40 % gel 30,000 mg (has no administration in time range)   ondansetron injection 4 mg (has no administration in time range)   prochlorperazine injection Soln 5 mg (has no administration in time range)   insulin aspart U-100 pen 0-5 Units (has no administration in time range)   furosemide injection 40 mg (has no administration in time range)   busPIRone tablet 5 mg (has no administration in time range)   dicyclomine capsule 10 mg (has no administration in time range)   apixaban tablet 2.5 mg (2.5 mg Oral Given 3/28/23 2322)   famotidine tablet 20 mg (has no administration in time range)   atorvastatin tablet 40 mg (has no administration in time range)   cetirizine tablet 10 mg (has no administration in time range)   cefTRIAXone (ROCEPHIN) 1 g in dextrose 5 % in water (D5W) 5 % 50 mL IVPB (MB+) (0 g Intravenous Stopped 3/28/23 8058)   aluminum-magnesium hydroxide-simethicone 200-200-20 mg/5 mL suspension 30 mL (has no administration in time range)   iohexoL (OMNIPAQUE 350) injection  100 mL (100 mLs Intravenous Given 3/28/23 1626)   ondansetron injection 4 mg (4 mg Intravenous Given 3/28/23 1755)   furosemide injection 60 mg (60 mg Intravenous Given 3/28/23 1924)     Medical Decision Making:   Initial Assessment:   Presentation concerning for possible ACS picture given chest tightness and anxiety. Troponin was trended. BNP was elevated at 242. CXR was unremarkable for structural abnormality. Though patient is not having UTI symptoms she also has suprapubic pain concerning for UTI, so U/A has been ordered. BNP was elevated, with a mild troponin bump that was trended. EKG showed ventricular paced rhythm. CXR unremarkable, lipase wnl.   Differential Diagnosis:   Ddx: ACS vs UTI vs ADHF exacerbation  Clinical Tests:   Lab Tests: Ordered  Radiological Study: Ordered  Medical Tests: Ordered          Attending Attestation:   Physician Attestation Statement for Resident:  As the supervising MD   Physician Attestation Statement: I have personally seen and examined this patient.   I agree with the above history.  -:   As the supervising MD I agree with the above PE.     As the supervising MD I agree with the above treatment, course, plan, and disposition.   -: Acute on chronic abdominal pain with some shortness of breath.  Will obtain CT scan to exclude more significant pathology in her abdomen.  Think she likely has a CHF exacerbation.  Will check x-ray, troponin BNP.  Turned over to Dr. Thompson pending results.  Anticipate hospitalization.                 ED Course as of 03/29/23 0900   Tue Mar 28, 2023   1453 EKG 12-lead  Paced, no acute ischemia per my independent interpretation.     [DC]      ED Course User Index  [DC] Kvng Lynch MD                 Clinical Impression:   Final diagnoses:  [R06.02] SOB (shortness of breath)  [R06.02] Shortness of breath  [R06.00] Dyspnea, unspecified type (Primary)        ED Disposition Condition    Observation Stable                Kinsey Waller  DO  Resident  03/28/23 1706       Kinsey Waller DO  Resident  03/29/23 0824       Kvng Lynch MD  03/29/23 0960

## 2023-03-28 NOTE — ED NOTES
I-STAT Chem-8+ Results:   Value Reference Range   Sodium 136 136-145 mmol/L   Potassium  3.7 3.5-5.1 mmol/L   Chloride 97  mmol/L   Ionized Calcium 1.19 1.06-1.42 mmol/L   CO2 (measured) 29 23-29 mmol/L   Glucose 85  mg/dL   BUN 26 6-30 mg/dL   Creatinine 1.5 0.5-1.4 mg/dL   Hematocrit 42 36-54%

## 2023-03-28 NOTE — Clinical Note
Diagnosis: Dyspnea, unspecified type [[id number]]   Future Attending Provider: JORGE LUIS JIMENEZ [[id number]]   Admitting Provider:: JORGE LUIS JIMENEZ [[id number]]

## 2023-03-28 NOTE — HPI
Nyasia Frye is a 83 y.o. female with a PMHx of HFpEF, AF, asthma, DM, HLD, HTN, and IBS who [place] [facility] [place] chronic [place] pain. Patient is a very poor historian. She reports acute worsening of her chronic generalized abdominal pain for the past few days. Symptoms are consistent with prior flares of her IBS. She notes associated abdominal distension, dyspnea on exertion, orthopnea, and bilateral lower extremity edema for the past few days. She feels that the fluid in her abdomen is exacerbating her IBS symptoms. Has chronic fluctuations of diarrhea and constipation 2/2 IBS without recent changes. Denies fever, chills, chest pain, N/V, numbness/tingling, LE pain, vision changes, HA, syncope, or new/ worsening cough.     ED: hypertensive to /84, vitals otherwise stable. No leukocytosis. Cr 1.5 (BL ~1.2), t.bili 1.3. , trop 0.046>>0.049. CXR without acute findings. CT AP negative for acute process.

## 2023-03-28 NOTE — ED PROVIDER NOTES
ED Physician Hand-off Note:    ED Course: I assumed care of patient from off-going ED physician, .  Briefly, Patient is a 83-year-old female with history of HFpEF, hypertension, IBS, AFib presenting to the ED with a week of worsening shortness of breath, chest tightness, and abdominal pain.  Workup significant for an elevated BNP and troponin.  EKG with no acute ST T wave changes.  Patient appears to be fluid overloaded on exam.  Bedside echo showed adequate EF with B-lines on the left lung.  CT abdomen pelvis showed no acute process.  Will admit to hospital medicine for repeat echo and management of volume overload.      Disposition:  Admitted to hospital medicine    Patient comfortable with plan. Patient counseled regarding exam, results, diagnosis, treatment, and plan.    Impression: stable    Final diagnoses:  [R06.02] SOB (shortness of breath)  [R06.02] Shortness of breath           Suleiman Hurtado DO  Resident  03/28/23 4318

## 2023-03-29 DIAGNOSIS — R06.02 SOB (SHORTNESS OF BREATH): Primary | ICD-10-CM

## 2023-03-29 LAB
ANION GAP SERPL CALC-SCNC: 11 MMOL/L (ref 8–16)
ASCENDING AORTA: 4 CM
AV INDEX (PROSTH): 0.82
AV MEAN GRADIENT: 3 MMHG
AV PEAK GRADIENT: 4 MMHG
AV VALVE AREA: 2.64 CM2
AV VELOCITY RATIO: 0.82
BACTERIA UR CULT: NORMAL
BASOPHILS # BLD AUTO: 0.04 K/UL (ref 0–0.2)
BASOPHILS NFR BLD: 0.6 % (ref 0–1.9)
BSA FOR ECHO PROCEDURE: 2.06 M2
BUN SERPL-MCNC: 24 MG/DL (ref 8–23)
CALCIUM SERPL-MCNC: 9.6 MG/DL (ref 8.7–10.5)
CHLORIDE SERPL-SCNC: 99 MMOL/L (ref 95–110)
CO2 SERPL-SCNC: 28 MMOL/L (ref 23–29)
CREAT SERPL-MCNC: 1.4 MG/DL (ref 0.5–1.4)
CV ECHO LV RWT: 0.27 CM
DIFFERENTIAL METHOD: ABNORMAL
DOP CALC AO PEAK VEL: 1.04 M/S
DOP CALC AO VTI: 17.93 CM
DOP CALC LVOT AREA: 3.2 CM2
DOP CALC LVOT DIAMETER: 2.03 CM
DOP CALC LVOT PEAK VEL: 0.85 M/S
DOP CALC LVOT STROKE VOLUME: 47.36 CM3
DOP CALCLVOT PEAK VEL VTI: 14.64 CM
E WAVE DECELERATION TIME: 255.29 MSEC
E/A RATIO: 2.61
E/E' RATIO: 15 M/S
ECHO LV POSTERIOR WALL: 0.73 CM (ref 0.6–1.1)
EJECTION FRACTION: 52 %
EOSINOPHIL # BLD AUTO: 0.1 K/UL (ref 0–0.5)
EOSINOPHIL NFR BLD: 2 % (ref 0–8)
ERYTHROCYTE [DISTWIDTH] IN BLOOD BY AUTOMATED COUNT: 14.6 % (ref 11.5–14.5)
EST. GFR  (NO RACE VARIABLE): 37.3 ML/MIN/1.73 M^2
ESTIMATED AVG GLUCOSE: 131 MG/DL (ref 68–131)
FRACTIONAL SHORTENING: 18 % (ref 28–44)
GLUCOSE SERPL-MCNC: 113 MG/DL (ref 70–110)
HBA1C MFR BLD: 6.2 % (ref 4–5.6)
HCT VFR BLD AUTO: 40.8 % (ref 37–48.5)
HGB BLD-MCNC: 13.4 G/DL (ref 12–16)
IMM GRANULOCYTES # BLD AUTO: 0.03 K/UL (ref 0–0.04)
IMM GRANULOCYTES NFR BLD AUTO: 0.5 % (ref 0–0.5)
INTERVENTRICULAR SEPTUM: 0.86 CM (ref 0.6–1.1)
LA MAJOR: 6.43 CM
LA MINOR: 6.98 CM
LA WIDTH: 3.73 CM
LEFT ATRIUM SIZE: 3.56 CM
LEFT ATRIUM VOLUME INDEX MOD: 29.2 ML/M2
LEFT ATRIUM VOLUME INDEX: 38 ML/M2
LEFT ATRIUM VOLUME MOD: 58.1 CM3
LEFT ATRIUM VOLUME: 75.55 CM3
LEFT INTERNAL DIMENSION IN SYSTOLE: 4.41 CM (ref 2.1–4)
LEFT VENTRICLE DIASTOLIC VOLUME INDEX: 70.31 ML/M2
LEFT VENTRICLE DIASTOLIC VOLUME: 139.91 ML
LEFT VENTRICLE MASS INDEX: 77 G/M2
LEFT VENTRICLE SYSTOLIC VOLUME INDEX: 44.4 ML/M2
LEFT VENTRICLE SYSTOLIC VOLUME: 88.27 ML
LEFT VENTRICULAR INTERNAL DIMENSION IN DIASTOLE: 5.38 CM (ref 3.5–6)
LEFT VENTRICULAR MASS: 152.76 G
LV LATERAL E/E' RATIO: 15 M/S
LV SEPTAL E/E' RATIO: 15 M/S
LYMPHOCYTES # BLD AUTO: 2.1 K/UL (ref 1–4.8)
LYMPHOCYTES NFR BLD: 31.5 % (ref 18–48)
MAGNESIUM SERPL-MCNC: 2.5 MG/DL (ref 1.6–2.6)
MCH RBC QN AUTO: 30.1 PG (ref 27–31)
MCHC RBC AUTO-ENTMCNC: 32.8 G/DL (ref 32–36)
MCV RBC AUTO: 92 FL (ref 82–98)
MONOCYTES # BLD AUTO: 0.5 K/UL (ref 0.3–1)
MONOCYTES NFR BLD: 8 % (ref 4–15)
MV PEAK A VEL: 0.23 M/S
MV PEAK E VEL: 0.6 M/S
MV STENOSIS PRESSURE HALF TIME: 74.03 MS
MV VALVE AREA P 1/2 METHOD: 2.97 CM2
NEUTROPHILS # BLD AUTO: 3.8 K/UL (ref 1.8–7.7)
NEUTROPHILS NFR BLD: 57.4 % (ref 38–73)
NRBC BLD-RTO: 0 /100 WBC
PHOSPHATE SERPL-MCNC: 3.3 MG/DL (ref 2.7–4.5)
PISA TR MAX VEL: 2.98 M/S
PLATELET # BLD AUTO: 192 K/UL (ref 150–450)
PMV BLD AUTO: 9.7 FL (ref 9.2–12.9)
POCT GLUCOSE: 103 MG/DL (ref 70–110)
POCT GLUCOSE: 103 MG/DL (ref 70–110)
POCT GLUCOSE: 124 MG/DL (ref 70–110)
POCT GLUCOSE: 96 MG/DL (ref 70–110)
POTASSIUM SERPL-SCNC: 4.4 MMOL/L (ref 3.5–5.1)
RA MAJOR: 7.48 CM
RA PRESSURE: 3 MMHG
RA WIDTH: 5.54 CM
RBC # BLD AUTO: 4.45 M/UL (ref 4–5.4)
RIGHT VENTRICULAR END-DIASTOLIC DIMENSION: 1.13 CM
RV TISSUE DOPPLER FREE WALL SYSTOLIC VELOCITY 1 (APICAL 4 CHAMBER VIEW): 12.98 CM/S
SINUS: 3.53 CM
SODIUM SERPL-SCNC: 138 MMOL/L (ref 136–145)
STJ: 2.77 CM
TDI LATERAL: 0.04 M/S
TDI SEPTAL: 0.04 M/S
TDI: 0.04 M/S
TR MAX PG: 36 MMHG
TRICUSPID ANNULAR PLANE SYSTOLIC EXCURSION: 1.95 CM
TROPONIN I SERPL DL<=0.01 NG/ML-MCNC: 0.07 NG/ML (ref 0–0.03)
TV REST PULMONARY ARTERY PRESSURE: 39 MMHG
WBC # BLD AUTO: 6.63 K/UL (ref 3.9–12.7)

## 2023-03-29 PROCEDURE — 96376 TX/PRO/DX INJ SAME DRUG ADON: CPT

## 2023-03-29 PROCEDURE — 99233 PR SUBSEQUENT HOSPITAL CARE,LEVL III: ICD-10-PCS | Mod: ,,,

## 2023-03-29 PROCEDURE — 94761 N-INVAS EAR/PLS OXIMETRY MLT: CPT

## 2023-03-29 PROCEDURE — 99233 SBSQ HOSP IP/OBS HIGH 50: CPT | Mod: ,,,

## 2023-03-29 PROCEDURE — 25000003 PHARM REV CODE 250: Performed by: PHYSICIAN ASSISTANT

## 2023-03-29 PROCEDURE — 84100 ASSAY OF PHOSPHORUS: CPT | Performed by: PHYSICIAN ASSISTANT

## 2023-03-29 PROCEDURE — G0378 HOSPITAL OBSERVATION PER HR: HCPCS

## 2023-03-29 PROCEDURE — 36415 COLL VENOUS BLD VENIPUNCTURE: CPT

## 2023-03-29 PROCEDURE — 36415 COLL VENOUS BLD VENIPUNCTURE: CPT | Performed by: PHYSICIAN ASSISTANT

## 2023-03-29 PROCEDURE — 83036 HEMOGLOBIN GLYCOSYLATED A1C: CPT | Performed by: PHYSICIAN ASSISTANT

## 2023-03-29 PROCEDURE — 84484 ASSAY OF TROPONIN QUANT: CPT | Mod: 91

## 2023-03-29 PROCEDURE — 63600175 PHARM REV CODE 636 W HCPCS: Performed by: PHYSICIAN ASSISTANT

## 2023-03-29 PROCEDURE — 85025 COMPLETE CBC W/AUTO DIFF WBC: CPT | Performed by: PHYSICIAN ASSISTANT

## 2023-03-29 PROCEDURE — 83735 ASSAY OF MAGNESIUM: CPT | Performed by: PHYSICIAN ASSISTANT

## 2023-03-29 PROCEDURE — 80048 BASIC METABOLIC PNL TOTAL CA: CPT | Performed by: PHYSICIAN ASSISTANT

## 2023-03-29 PROCEDURE — 96365 THER/PROPH/DIAG IV INF INIT: CPT | Mod: 59

## 2023-03-29 PROCEDURE — 25000003 PHARM REV CODE 250

## 2023-03-29 PROCEDURE — 84484 ASSAY OF TROPONIN QUANT: CPT | Performed by: PHYSICIAN ASSISTANT

## 2023-03-29 RX ORDER — GUAIFENESIN 100 MG/5ML
200 SOLUTION ORAL EVERY 4 HOURS PRN
Status: DISCONTINUED | OUTPATIENT
Start: 2023-03-29 | End: 2023-03-30 | Stop reason: HOSPADM

## 2023-03-29 RX ORDER — BENZONATATE 100 MG/1
100 CAPSULE ORAL 3 TIMES DAILY PRN
Status: DISCONTINUED | OUTPATIENT
Start: 2023-03-29 | End: 2023-03-30 | Stop reason: HOSPADM

## 2023-03-29 RX ADMIN — BENZONATATE 100 MG: 100 CAPSULE ORAL at 09:03

## 2023-03-29 RX ADMIN — BUSPIRONE HYDROCHLORIDE 5 MG: 5 TABLET ORAL at 09:03

## 2023-03-29 RX ADMIN — DICYCLOMINE HYDROCHLORIDE 10 MG: 10 CAPSULE ORAL at 02:03

## 2023-03-29 RX ADMIN — APIXABAN 2.5 MG: 2.5 TABLET, FILM COATED ORAL at 09:03

## 2023-03-29 RX ADMIN — ALUMINUM HYDROXIDE, MAGNESIUM HYDROXIDE, AND SIMETHICONE 30 ML: 200; 200; 20 SUSPENSION ORAL at 09:03

## 2023-03-29 RX ADMIN — ACETAMINOPHEN 650 MG: 325 TABLET ORAL at 09:03

## 2023-03-29 RX ADMIN — DICYCLOMINE HYDROCHLORIDE 10 MG: 10 CAPSULE ORAL at 09:03

## 2023-03-29 RX ADMIN — FUROSEMIDE 40 MG: 10 INJECTION, SOLUTION INTRAMUSCULAR; INTRAVENOUS at 09:03

## 2023-03-29 RX ADMIN — ATORVASTATIN CALCIUM 40 MG: 40 TABLET, FILM COATED ORAL at 09:03

## 2023-03-29 RX ADMIN — CEFTRIAXONE 1 G: 1 INJECTION, POWDER, FOR SOLUTION INTRAMUSCULAR; INTRAVENOUS at 11:03

## 2023-03-29 RX ADMIN — FAMOTIDINE 20 MG: 20 TABLET, FILM COATED ORAL at 09:03

## 2023-03-29 RX ADMIN — CETIRIZINE HYDROCHLORIDE 10 MG: 10 TABLET, FILM COATED ORAL at 09:03

## 2023-03-29 NOTE — ASSESSMENT & PLAN NOTE
Ischemic congestive cardiomyopathy  - appears volume overloaded on admit  - , CXR clear  - started IV lasix 40 mg BID - continue  - repeat TTE - pending  - keep K>4 and Mg>2  - monitor tele  - strict I/Os, daily weights  - low Na diet w/ fluid restriction

## 2023-03-29 NOTE — ASSESSMENT & PLAN NOTE
Body mass index is 35.7 kg/m². Morbid obesity complicates all aspects of disease management from diagnostic modalities to treatment. Weight loss encouraged and health benefits explained to patient.

## 2023-03-29 NOTE — PLAN OF CARE
[facility] - Observation [facility]  Initial Discharge Assessment       Primary Care Provider: Marianne Padilla MD    Admission Diagnosis: Shortness of breath [R06.02]  SOB (shortness of breath) [R06.02]  Chest pain [R07.9]  Dyspnea, unspecified type [R06.00]    Admission Date: 3/28/2023  Expected Discharge Date: 3/30/2023         Payor: [employer] MANAGED MEDICARE / Plan: [employer] CHOICES 65 / Product Type: Medicare Advantage /     Extended Emergency Contact Information  Primary Emergency Contact: Ken Frye   [place]  Mobile Phone: [phone]  Relation: Son    Discharge Plan A: Home  Discharge Plan B: Home      [id number] - [place], [place] - [address]  [address]  [place] [address]  Phone: [phone] Fax: [phone]    [id number] - [place], [place] - [address]  [address]  [place] [address]  Phone: [phone] Fax: [phone]        Assessment completed.  Pt lives alone, is independent uses a walker when needed and does not use any home oxygen.     Pt's PCP is Dr. Padilla whom she sw3 1 month ago.  Her preferred RX is [facility].    DC Plan is HOME

## 2023-03-29 NOTE — PROGRESS NOTES
"[facility]) nurse navigator notified of HFT[facility] candidate in need of education and introduction to 4-6 week program.      PT aao x 3 while sitting in bed . Introduced self to pt as HFT[facility] nurse navigator.     Patient given "[facility] Home Care Guide" which includes "Daily weight and symptom tracker".  Encouraged pt  to review information.      Reviewed the following key points of HFT[facility] program with pt and family:   1.) Take your medications as directed.    2.) Weight yourself daily   3.) Follow low salt and limited fluid diet.    4.) Stop smoking and start exercising   5.) Go to your appointments and call your team.      Pt reminded to follow Symptom tracker and to call at the onset of symptoms according to tracker.     Reviewed plan for follow up once discharged to include phone calls, in person and virtual visits to assist pt optimizing their heart failure medication regimen and encouraging healthy lifestyle modifications.  Reminded pt that program will assist them over the next 4-6 weeks and then patient will be transferred to long term care provider .  Reminded pt how to contact HFT[facility] navigator via phone and or via [employer].     Pt instructed appointment will be printed on hospital discharge paperwork.     Pt also reminded RN will call 48-72 hours after discharge to check on them.     PT can verbalize read back of information given.  Encouraged pt and family to read over information often and contact team with any questions or concerns.      PT breaks in when I am teaching to tell me that she has been in our Clinic before and has the "Booklet" but would like another one. She states that she weighs herself daily and was watching her weight and just doesn't know what happened. PT states "I think I just got thirsty". PT states she needs to go to our Clinic again.  "

## 2023-03-29 NOTE — ASSESSMENT & PLAN NOTE
- acute on chronic issue likely exacerbated by vol overload and UTI  - continue home bentyl and famotidine  - add prn maalox

## 2023-03-29 NOTE — SUBJECTIVE & OBJECTIVE
Past Medical History:   Diagnosis Date    *Atrial fibrillation     Allergy     Atrial fibrillation     Controlled gout     Diabetes mellitus type II     Hypercholesteremia     Hyperlipidemia     Hypertension     Irritable bowel syndrome (IBS)     Osteoporosis, senile        Past Surgical History:   Procedure Laterality Date    CARDIAC DEFIBRILLATOR PLACEMENT      OOPHORECTOMY      TUBAL LIGATION         Review of patient's allergies indicates:   Allergen Reactions    Beta-adrenergic agents     Ciprofloxacin Other (See Comments)    Clindamycin      rash    Metformin     Penicillins      Eye swelling     Verapamil     Zyloprim [allopurinol]        No current facility-administered medications on file prior to encounter.     Current Outpatient Medications on File Prior to Encounter   Medication Sig    acetaminophen (TYLENOL) 500 MG tablet Take 1 tablet (500 mg total) by mouth every 6 (six) hours as needed for Pain (and fever).    albuterol (PROVENTIL/VENTOLIN HFA) 90 mcg/actuation inhaler Inhale 2 puffs into the lungs.    atorvastatin (LIPITOR) 40 MG tablet Take 40 mg by mouth.    busPIRone (BUSPAR) 5 MG Tab Take 5 mg by mouth.    denosumab (PROLIA) 60 mg/mL Syrg Inject 1 mL (60 mg total) into the skin every 6 (six) months.    diclofenac sodium (VOLTAREN ARTHRITIS PAIN) 1 % Gel Apply 2 g topically once daily.    dicyclomine (BENTYL) 10 MG capsule     docosahexaenoic acid-epa 120-180 mg Cap Take 2 capsules by mouth.    econazole nitrate 1 % cream Apply topically 2 (two) times daily.    ELIQUIS 2.5 mg Tab Take 2.5 mg by mouth 2 (two) times daily.    ergocalciferol (ERGOCALCIFEROL) 50,000 unit Cap SMARTSI Capsule(s) By Mouth    famotidine (PEPCID) 20 MG tablet Take 1 tablet (20 mg total) by mouth 2 (two) times daily.    febuxostat (ULORIC) 40 mg Tab Take 20 mg by mouth.    fish oil-omega-3 fatty acids 300-1,000 mg capsule Take 2 g by mouth once daily.      fluticasone propionate (FLONASE) 50 mcg/actuation nasal spray 1  spray (50 mcg total) by Each Nostril route 2 (two) times daily.    furosemide (LASIX) 40 MG tablet Take 40 mg by mouth 2 (two) times daily.    GAVILYTE-C 240-22.72-6.72 -5.84 gram SolR USE AS DIRECTED    glucagon 1 mg SolR Inject as directed.    JARDIANCE 25 mg tablet Take 25 mg by mouth once daily.    levalbuterol (XOPENEX) 1.25 mg/3 mL nebulizer solution Take 3 mLs (1.25 mg total) by nebulization every 6 (six) hours as needed for Wheezing. Rescue    loratadine (CLARITIN) 10 mg tablet Take 1 tablet (10 mg total) by mouth once daily.    losartan (COZAAR) 100 MG tablet Take 100 mg by mouth once daily.    multivitamin with minerals tablet Take by mouth.    mv-min-folic acid-lutein 500-250 mcg Chew Take 1 tablet by mouth.    nitroGLYCERIN (NITROSTAT) 0.4 MG SL tablet Place 1 tablet (0.4 mg total) under the tongue every 5 (five) minutes as needed for Chest pain.    olopatadine (PATANOL) 0.1 % ophthalmic solution 1 drop.    pantoprazole (PROTONIX) 20 MG tablet Take 20 mg by mouth every morning.    pravastatin (PRAVACHOL) 40 MG tablet Take 40 mg by mouth once daily.      predniSONE (DELTASONE) 20 MG tablet Take 2 tablets (40 mg total) by mouth once daily. for 5 days    ranitidine (ZANTAC) 150 MG capsule Take 150 mg by mouth.    TRADJENTA 5 mg Tab tablet TAKE ONE(1) TABLET BY MOUTH EVERY DAY    TRESIBA FLEXTOUCH U-100 100 unit/mL (3 mL) InPn SMARTSI Unit(s) SUB-Q Every Morning    [DISCONTINUED] amiodarone (PACERONE) 200 MG Tab Take by mouth once daily.    [DISCONTINUED] captopriL (CAPOTEN) 25 MG tablet Take 25 mg by mouth 2 (two) times daily.    [DISCONTINUED] escitalopram oxalate (LEXAPRO) 10 MG tablet Take 10 mg by mouth once daily.    [DISCONTINUED] omeprazole (PRILOSEC) 20 MG capsule TAKE 1 CAPSULE BY MOUTH EVERY DAY IN THE MORNING    [DISCONTINUED] sertraline (ZOLOFT) 50 MG tablet Take 50 mg by mouth once daily.     Family History       Problem Relation (Age of Onset)    Breast cancer Sister    Colon cancer Sister           Tobacco Use    Smoking status: Former     Types: Cigarettes    Smokeless tobacco: Never   Substance and Sexual Activity    Alcohol use: No    Drug use: No    Sexual activity: Never     Review of Systems   Constitutional:  Negative for appetite change, chills and fever.   HENT:  Negative for congestion, trouble swallowing and voice change.    Eyes:  Negative for photophobia and visual disturbance.   Respiratory:  Positive for shortness of breath. Negative for cough, chest tightness and wheezing.    Cardiovascular:  Positive for leg swelling. Negative for chest pain and palpitations.   Gastrointestinal:  Positive for abdominal pain, constipation and diarrhea. Negative for abdominal distention, blood in stool, nausea and vomiting.   Genitourinary:  Negative for difficulty urinating, dysuria, flank pain and hematuria.        Suprapubic discomfort   Musculoskeletal:  Negative for arthralgias, back pain and gait problem.   Skin:  Negative for color change and wound.   Neurological:  Negative for dizziness, seizures, syncope, weakness, light-headedness, numbness and headaches.   Psychiatric/Behavioral:  Negative for agitation, behavioral problems and confusion.    Objective:     Vital Signs (Most Recent):  Temp: 98.1 °F (36.7 °C) (03/28/23 2242)  Pulse: 78 (03/28/23 2255)  Resp: 18 (03/28/23 2242)  BP: (!) 166/77 (03/28/23 2242)  SpO2: 97 % (03/28/23 2242)   Vital Signs (24h Range):  Temp:  [98.1 °F (36.7 °C)-99 °F (37.2 °C)] 98.1 °F (36.7 °C)  Pulse:  [65-80] 78  Resp:  [14-32] 18  SpO2:  [95 %-99 %] 97 %  BP: (121-196)/(61-98) 166/77     Weight: 94.3 kg (208 lb)  Body mass index is 35.7 kg/m².    Physical Exam  Vitals and nursing note reviewed.   Constitutional:       General: She is not in acute distress.     Appearance: She is well-developed. She is obese.   HENT:      Head: Normocephalic and atraumatic.      Mouth/Throat:      Pharynx: No oropharyngeal exudate.   Eyes:      General: No scleral icterus.      Extraocular Movements: Extraocular movements intact.      Conjunctiva/sclera: Conjunctivae normal.   Cardiovascular:      Rate and Rhythm: Normal rate and regular rhythm.      Heart sounds: Normal heart sounds.   Pulmonary:      Effort: Pulmonary effort is normal. No respiratory distress.      Breath sounds: No wheezing.      Comments: Diminished BS to BLL  Abdominal:      General: Bowel sounds are normal. There is no distension.      Palpations: Abdomen is soft.      Tenderness: There is no abdominal tenderness.   Musculoskeletal:         General: No tenderness. Normal range of motion.      Cervical back: Normal range of motion and neck supple.      Right lower leg: Edema present.      Left lower leg: Edema present.   Lymphadenopathy:      Cervical: No cervical adenopathy.   Skin:     General: Skin is warm and dry.      Capillary Refill: Capillary refill takes less than 2 seconds.      Findings: No rash.   Neurological:      Mental Status: She is alert and oriented to person, place, and time.      Cranial Nerves: No cranial nerve deficit.      Sensory: No sensory deficit.      Coordination: Coordination normal.   Psychiatric:         Behavior: Behavior normal.         Thought Content: Thought content normal.         Judgment: Judgment normal.           Significant Labs: All pertinent labs within the past 24 hours have been reviewed.  BMP:   Recent Labs   Lab 03/28/23  1459   GLU 85      K 3.8   CL 99   CO2 29   BUN 24*   CREATININE 1.5*   CALCIUM 10.0     CBC:   Recent Labs   Lab 03/28/23  1459 03/28/23  1508   WBC 6.81  --    HGB 13.7  --    HCT 43.0 42     --        Significant Imaging: I have reviewed all pertinent imaging results/findings within the past 24 hours.  CT Abdomen Pelvis With Contrast  Narrative: EXAMINATION:  CT ABDOMEN PELVIS WITH CONTRAST    CLINICAL HISTORY:  Abdominal pain, acute, nonlocalized;    TECHNIQUE:  Axial images of the abdomen and pelvis were acquired  after the use of  100 cc [id number] IV contrast.  Coronal and sagittal reconstructions were also obtained.    COMPARISON:  XR chest 03/28/2023    FINDINGS:  HEART: Enlarged in size. No pericardial effusion.  Pacer wires noted.    LUNGS: There is bilateral basilar subsegmental atelectasis.    LIVER: The liver is hypoattenuating, suggesting steatosis, correlation with LFTs recommended.  There is a subcentimeter low attenuating lesion within the posterior aspect of the right hepatic lobe, too small for characterization.  There is a triangular focus of arterial enhancement within the anterior aspect of the right hepatic lobe, suggesting perfusion anomaly.    GALLBLADDER: The gallbladder is mildly distended without secondary findings to suggest acute cholecystitis.    BILE DUCTS: No evidence of dilated ducts.    PANCREAS: There is atrophic change of the pancreas without pancreatic ductal dilation.    SPLEEN: Accessory splenule.    ADRENALS: Unremarkable.    KIDNEYS/URETERS: The kidneys enhance symmetrically.  Scattered punctate hypoattenuating lesions within bilateral kidneys, too small to characterize.  No hydronephrosis or nephrolithiasis. No ureteral dilatation.    BLADDER: No evidence of wall thickening.    REPRODUCTIVE ORGANS: Uterus is unremarkable.  The adnexa is unremarkable.    STOMACH/DUODENUM: Unremarkable.    BOWEL/MESENTERY: Small and large bowel are normal in caliber with no evidence of obstruction. No evidence of inflammation or wall thickening.  Appendix is unremarkable.  Scattered diverticula throughout the colon without inflammation.    PERITONEUM: No free fluid.  No pneumoperitoneum.    LYMPH NODES: No pathologic lymphadenopathy.    VASCULATURE: No aneurysm. Moderate aortoiliac calcific atherosclerosis.  Calcifications at the left renal artery origin contributing to mild stenosis.    ABDOMINAL WALL:  Unremarkable.    BONES: There is osteopenia.  Degenerative changes of the visualized spine, including grade 1  anterolisthesis at L4-L5.  No acute fracture. No suspicious osseous lesions.  Impression: 1. Findings suggesting hepatic steatosis, correlation with LFTs recommended.  2. Moderate aortoiliac calcifications including calcifications at the left renal artery origin contributing to mild stenosis.  3. Cardiomegaly.  4. Colonic diverticulosis without diverticulitis.  5. Several additional findings above.    Electronically signed by resident: Christos Medeiros  Date:    03/28/2023  Time:    16:58    Electronically signed by: Kareem Ross MD  Date:    03/28/2023  Time:    17:25  X-Ray Chest AP Portable  Narrative: EXAMINATION:  XR CHEST AP PORTABLE    CLINICAL HISTORY:  Shortness of breath    TECHNIQUE:  Single frontal view of the chest was performed.    COMPARISON:  01/05/2023    FINDINGS:  Left chest wall pacer noted.  The cardiomediastinal silhouette is prominent, similar to the previous exam..  There is no pleural effusion.  The trachea is midline.  The lungs are symmetrically expanded bilaterally with minimally coarse central hilar interstitial attenuation accentuated by habitus..  No large focal consolidation seen.  There is no pneumothorax.  The osseous structures are remarkable for degenerative changes..  Impression: 1. No acute cardiopulmonary process, interstitial findings are accentuated by habitus.    Electronically signed by: Kareem Ross MD  Date:    03/28/2023  Time:    15:27

## 2023-03-29 NOTE — ASSESSMENT & PLAN NOTE
CKD (chronic kidney disease), stage III  - Cr 1.5, baseline ~1.2  - suspect 2/2 cardiorenal syndrome and UTI  - IV diuresis and antibiotics as above  - avoid nephrotoxins, renally dose meds

## 2023-03-29 NOTE — ASSESSMENT & PLAN NOTE
- acute on chronic issue, likely exacerbated by vol overload and UTI   - Endorses persistent generalized abdominal pain  - continue home bentyl and famotidine  - CT A/P with no acute findings, reviewed  - added PRN maalox and gasX

## 2023-03-29 NOTE — ASSESSMENT & PLAN NOTE
CKD (chronic kidney disease), stage III  - Cr 1.5 on admit, baseline ~1.2   - Cr improved to 1.4 on 3/29  - suspect 2/2 cardiorenal syndrome and UTI  - Continue IV diuresis and antibiotics  - avoid nephrotoxins, renally dose meds

## 2023-03-29 NOTE — ASSESSMENT & PLAN NOTE
- likely contributing to acute on chronic abd pain  - afebrile without leukocytosis   - start IV CTX 1g q24hrs  - follow UCx

## 2023-03-29 NOTE — ASSESSMENT & PLAN NOTE
Ischemic congestive cardiomyopathy  - appears volume overload on exam  - , CXR clear  - start IV lasix 40 mg BID  - repeat TTE  - keep K>4 and Mg>2  - monitor tele  - strict I/Os, daily weights  - low Na diet w/ fluid restriction

## 2023-03-29 NOTE — PROGRESS NOTES
Pharmacist Renal Dose Adjustment Note    Nyasia Frye is a 83 y.o. female being treated with famotidine    Patient Data:    Recent Labs   Lab 03/28/23  1459   CREATININE 1.5*     Serum creatinine: 1.5 mg/dL (H) 03/28/23 1459  Estimated creatinine clearance: 31.6 mL/min (A)    Famotidine 20 mg BID is being changed to daily in setting of CrCl <50 mL/min    Pharmacist's Name: Cadence Hernandez  Pharmacist's Extension: [phone]

## 2023-03-29 NOTE — ASSESSMENT & PLAN NOTE
- not on BB for unknown reason -- will consider starting once euvoleumic  - continue eliquis  - PCP f/u

## 2023-03-29 NOTE — H&P
[facility] - Observation [address] [place] [address] Medicine  History & Physical    Patient Name: Nyasia Frye  MRN: [id number]  Patient Class: OP- Observation  Admission Date: 3/28/2023  Attending Physician: Sohan Cristina MD   Primary Care Provider: Marianne Padilla MD         Patient information was obtained from patient, past medical records and ER records.     Subjective:     Principal Problem:Acute on chronic diastolic heart failure    Chief Complaint:   Chief Complaint   Patient presents with    Shortness of Breath    Abdominal Pain     Hx chf, ibs        HPI: Nyasia Frye is a 83 y.o. female with a PMHx of HFpEF, AF, asthma, DM, HLD, HTN, and IBS who presents to [facility] with acute on chronic abdominal pain. Patient is a very poor historian. She reports acute worsening of her chronic generalized abdominal pain for the past few days. Symptoms are consistent with prior flares of her IBS. She notes associated abdominal distension, dyspnea on exertion, orthopnea, and bilateral lower extremity edema for the past few days. She feels that the fluid in her abdomen is exacerbating her IBS symptoms. Has chronic fluctuations of diarrhea and constipation 2/2 IBS without recent changes. Denies fever, chills, chest pain, N/V, numbness/tingling, LE pain, vision changes, HA, syncope, or new/ worsening cough.     ED: hypertensive to /84, vitals otherwise stable. No leukocytosis. Cr 1.5 (BL ~1.2), t.bili 1.3. , trop 0.046>>0.049. CXR without acute findings. CT AP negative for acute process.       Past Medical History:   Diagnosis Date    *Atrial fibrillation     Allergy     Atrial fibrillation     Controlled gout     Diabetes mellitus type II     Hypercholesteremia     Hyperlipidemia     Hypertension     Irritable bowel syndrome (IBS)     Osteoporosis, senile        Past Surgical History:   Procedure Laterality Date    CARDIAC DEFIBRILLATOR PLACEMENT      OOPHORECTOMY      TUBAL LIGATION         Review of patient's  allergies indicates:   Allergen Reactions    Beta-adrenergic agents     Ciprofloxacin Other (See Comments)    Clindamycin      rash    Metformin     Penicillins      Eye swelling     Verapamil     Zyloprim [allopurinol]        No current facility-administered medications on file prior to encounter.     Current Outpatient Medications on File Prior to Encounter   Medication Sig    acetaminophen (TYLENOL) 500 MG tablet Take 1 tablet (500 mg total) by mouth every 6 (six) hours as needed for Pain (and fever).    albuterol (PROVENTIL/VENTOLIN HFA) 90 mcg/actuation inhaler Inhale 2 puffs into the lungs.    atorvastatin (LIPITOR) 40 MG tablet Take 40 mg by mouth.    busPIRone (BUSPAR) 5 MG Tab Take 5 mg by mouth.    denosumab (PROLIA) 60 mg/mL Syrg Inject 1 mL (60 mg total) into the skin every 6 (six) months.    diclofenac sodium (VOLTAREN ARTHRITIS PAIN) 1 % Gel Apply 2 g topically once daily.    dicyclomine (BENTYL) 10 MG capsule     docosahexaenoic acid-epa 120-180 mg Cap Take 2 capsules by mouth.    econazole nitrate 1 % cream Apply topically 2 (two) times daily.    ELIQUIS 2.5 mg Tab Take 2.5 mg by mouth 2 (two) times daily.    ergocalciferol (ERGOCALCIFEROL) 50,000 unit Cap SMARTSI Capsule(s) By Mouth    famotidine (PEPCID) 20 MG tablet Take 1 tablet (20 mg total) by mouth 2 (two) times daily.    febuxostat (ULORIC) 40 mg Tab Take 20 mg by mouth.    fish oil-omega-3 fatty acids 300-1,000 mg capsule Take 2 g by mouth once daily.      fluticasone propionate (FLONASE) 50 mcg/actuation nasal spray 1 spray (50 mcg total) by Each Nostril route 2 (two) times daily.    furosemide (LASIX) 40 MG tablet Take 40 mg by mouth 2 (two) times daily.    GAVILYTE-C 240-22.72-6.72 -5.84 gram SolR USE AS DIRECTED    glucagon 1 mg SolR Inject as directed.    JARDIANCE 25 mg tablet Take 25 mg by mouth once daily.    levalbuterol (XOPENEX) 1.25 mg/3 mL nebulizer solution Take 3 mLs (1.25 mg total) by  nebulization every 6 (six) hours as needed for Wheezing. Rescue    loratadine (CLARITIN) 10 mg tablet Take 1 tablet (10 mg total) by mouth once daily.    losartan (COZAAR) 100 MG tablet Take 100 mg by mouth once daily.    multivitamin with minerals tablet Take by mouth.    mv-min-folic acid-lutein 500-250 mcg Chew Take 1 tablet by mouth.    nitroGLYCERIN (NITROSTAT) 0.4 MG SL tablet Place 1 tablet (0.4 mg total) under the tongue every 5 (five) minutes as needed for Chest pain.    olopatadine (PATANOL) 0.1 % ophthalmic solution 1 drop.    pantoprazole (PROTONIX) 20 MG tablet Take 20 mg by mouth every morning.    pravastatin (PRAVACHOL) 40 MG tablet Take 40 mg by mouth once daily.      predniSONE (DELTASONE) 20 MG tablet Take 2 tablets (40 mg total) by mouth once daily. for 5 days    ranitidine (ZANTAC) 150 MG capsule Take 150 mg by mouth.    TRADJENTA 5 mg Tab tablet TAKE ONE(1) TABLET BY MOUTH EVERY DAY    TRESIBA FLEXTOUCH U-100 100 unit/mL (3 mL) InPn SMARTSI Unit(s) SUB-Q Every Morning    [DISCONTINUED] amiodarone (PACERONE) 200 MG Tab Take by mouth once daily.    [DISCONTINUED] captopriL (CAPOTEN) 25 MG tablet Take 25 mg by mouth 2 (two) times daily.    [DISCONTINUED] escitalopram oxalate (LEXAPRO) 10 MG tablet Take 10 mg by mouth once daily.    [DISCONTINUED] omeprazole (PRILOSEC) 20 MG capsule TAKE 1 CAPSULE BY MOUTH EVERY DAY IN THE MORNING    [DISCONTINUED] sertraline (ZOLOFT) 50 MG tablet Take 50 mg by mouth once daily.     Family History       Problem Relation (Age of Onset)    Breast cancer Sister    Colon cancer Sister          Tobacco Use    Smoking status: Former     Types: Cigarettes    Smokeless tobacco: Never   Substance and Sexual Activity    Alcohol use: No    Drug use: No    Sexual activity: Never     Review of Systems   Constitutional:  Negative for appetite change, chills and fever.   HENT:  Negative for congestion, trouble swallowing and voice change.    Eyes:   Negative for photophobia and visual disturbance.   Respiratory:  Positive for shortness of breath. Negative for cough, chest tightness and wheezing.    Cardiovascular:  Positive for leg swelling. Negative for chest pain and palpitations.   Gastrointestinal:  Positive for abdominal pain, constipation and diarrhea. Negative for abdominal distention, blood in stool, nausea and vomiting.   Genitourinary:  Negative for difficulty urinating, dysuria, flank pain and hematuria.        Suprapubic discomfort   Musculoskeletal:  Negative for arthralgias, back pain and gait problem.   Skin:  Negative for color change and wound.   Neurological:  Negative for dizziness, seizures, syncope, weakness, light-headedness, numbness and headaches.   Psychiatric/Behavioral:  Negative for agitation, behavioral problems and confusion.    Objective:     Vital Signs (Most Recent):  Temp: 98.1 °F (36.7 °C) (03/28/23 2242)  Pulse: 78 (03/28/23 2255)  Resp: 18 (03/28/23 2242)  BP: (!) 166/77 (03/28/23 2242)  SpO2: 97 % (03/28/23 2242)   Vital Signs (24h Range):  Temp:  [98.1 °F (36.7 °C)-99 °F (37.2 °C)] 98.1 °F (36.7 °C)  Pulse:  [65-80] 78  Resp:  [14-32] 18  SpO2:  [95 %-99 %] 97 %  BP: (121-196)/(61-98) 166/77     Weight: 94.3 kg (208 lb)  Body mass index is 35.7 kg/m².    Physical Exam  Vitals and nursing note reviewed.   Constitutional:       General: She is not in acute distress.     Appearance: She is well-developed. She is obese.   HENT:      Head: Normocephalic and atraumatic.      Mouth/Throat:      Pharynx: No oropharyngeal exudate.   Eyes:      General: No scleral icterus.     Extraocular Movements: Extraocular movements intact.      Conjunctiva/sclera: Conjunctivae normal.   Cardiovascular:      Rate and Rhythm: Normal rate and regular rhythm.      Heart sounds: Normal heart sounds.   Pulmonary:      Effort: Pulmonary effort is normal. No respiratory distress.      Breath sounds: No wheezing.      Comments: Diminished BS to  BLL  Abdominal:      General: Bowel sounds are normal. There is no distension.      Palpations: Abdomen is soft.      Tenderness: There is no abdominal tenderness.   Musculoskeletal:         General: No tenderness. Normal range of motion.      Cervical back: Normal range of motion and neck supple.      Right lower leg: Edema present.      Left lower leg: Edema present.   Lymphadenopathy:      Cervical: No cervical adenopathy.   Skin:     General: Skin is warm and dry.      Capillary Refill: Capillary refill takes less than 2 seconds.      Findings: No rash.   Neurological:      Mental Status: She is alert and oriented to person, place, and time.      Cranial Nerves: No cranial nerve deficit.      Sensory: No sensory deficit.      Coordination: Coordination normal.   Psychiatric:         Behavior: Behavior normal.         Thought Content: Thought content normal.         Judgment: Judgment normal.           Significant Labs: All pertinent labs within the past 24 hours have been reviewed.  BMP:   Recent Labs   Lab 03/28/23  1459   GLU 85      K 3.8   CL 99   CO2 29   BUN 24*   CREATININE 1.5*   CALCIUM 10.0     CBC:   Recent Labs   Lab 03/28/23  1459 03/28/23  1508   WBC 6.81  --    HGB 13.7  --    HCT 43.0 42     --        Significant Imaging: I have reviewed all pertinent imaging results/findings within the past 24 hours.  CT Abdomen Pelvis With Contrast  Narrative: EXAMINATION:  CT ABDOMEN PELVIS WITH CONTRAST    CLINICAL HISTORY:  Abdominal pain, acute, nonlocalized;    TECHNIQUE:  Axial images of the abdomen and pelvis were acquired  after the use of 100 cc [id number] IV contrast.  Coronal and sagittal reconstructions were also obtained.    COMPARISON:  XR chest 03/28/2023    FINDINGS:  HEART: Enlarged in size. No pericardial effusion.  Pacer wires noted.    LUNGS: There is bilateral basilar subsegmental atelectasis.    LIVER: The liver is hypoattenuating, suggesting steatosis, correlation with LFTs  recommended.  There is a subcentimeter low attenuating lesion within the posterior aspect of the right hepatic lobe, too small for characterization.  There is a triangular focus of arterial enhancement within the anterior aspect of the right hepatic lobe, suggesting perfusion anomaly.    GALLBLADDER: The gallbladder is mildly distended without secondary findings to suggest acute cholecystitis.    BILE DUCTS: No evidence of dilated ducts.    PANCREAS: There is atrophic change of the pancreas without pancreatic ductal dilation.    SPLEEN: Accessory splenule.    ADRENALS: Unremarkable.    KIDNEYS/URETERS: The kidneys enhance symmetrically.  Scattered punctate hypoattenuating lesions within bilateral kidneys, too small to characterize.  No hydronephrosis or nephrolithiasis. No ureteral dilatation.    BLADDER: No evidence of wall thickening.    REPRODUCTIVE ORGANS: Uterus is unremarkable.  The adnexa is unremarkable.    STOMACH/DUODENUM: Unremarkable.    BOWEL/MESENTERY: Small and large bowel are normal in caliber with no evidence of obstruction. No evidence of inflammation or wall thickening.  Appendix is unremarkable.  Scattered diverticula throughout the colon without inflammation.    PERITONEUM: No free fluid.  No pneumoperitoneum.    LYMPH NODES: No pathologic lymphadenopathy.    VASCULATURE: No aneurysm. Moderate aortoiliac calcific atherosclerosis.  Calcifications at the left renal artery origin contributing to mild stenosis.    ABDOMINAL WALL:  Unremarkable.    BONES: There is osteopenia.  Degenerative changes of the visualized spine, including grade 1 anterolisthesis at L4-L5.  No acute fracture. No suspicious osseous lesions.  Impression: 1. Findings suggesting hepatic steatosis, correlation with LFTs recommended.  2. Moderate aortoiliac calcifications including calcifications at the left renal artery origin contributing to mild stenosis.  3. Cardiomegaly.  4. Colonic diverticulosis without diverticulitis.  5.  Several additional findings above.    Electronically signed by resident: Christos Medeiros  Date:    03/28/2023  Time:    16:58    Electronically signed by: Kareem Ross MD  Date:    03/28/2023  Time:    17:25  X-Ray Chest AP Portable  Narrative: EXAMINATION:  XR CHEST AP PORTABLE    CLINICAL HISTORY:  Shortness of breath    TECHNIQUE:  Single frontal view of the chest was performed.    COMPARISON:  01/05/2023    FINDINGS:  Left chest wall pacer noted.  The cardiomediastinal silhouette is prominent, similar to the previous exam..  There is no pleural effusion.  The trachea is midline.  The lungs are symmetrically expanded bilaterally with minimally coarse central hilar interstitial attenuation accentuated by habitus..  No large focal consolidation seen.  There is no pneumothorax.  The osseous structures are remarkable for degenerative changes..  Impression: 1. No acute cardiopulmonary process, interstitial findings are accentuated by habitus.    Electronically signed by: Kareem Ross MD  Date:    03/28/2023  Time:    15:27        Assessment/Plan:     * Acute on chronic diastolic heart failure  Ischemic congestive cardiomyopathy  - appears volume overload on exam  - , CXR clear  - start IV lasix 40 mg BID  - repeat TTE  - keep K>4 and Mg>2  - monitor tele  - strict I/Os, daily weights  - low Na diet w/ fluid restriction    DHEERAJ (acute kidney injury)  CKD (chronic kidney disease), stage III  - Cr 1.5, baseline ~1.2  - suspect 2/2 cardiorenal syndrome and UTI  - IV diuresis and antibiotics as above  - avoid nephrotoxins, renally dose meds    IBS (irritable bowel syndrome)  - acute on chronic issue likely exacerbated by vol overload and UTI  - continue home bentyl and famotidine  - add prn maalox    Acute cystitis without hematuria  - likely contributing to acute on chronic abd pain  - afebrile without leukocytosis   - start IV CTX 1g q24hrs  - follow UCx    ICD (implantable cardioverter-defibrillator) in place  -  no acute issues     Morbid obesity  Body mass index is 35.7 kg/m². Morbid obesity complicates all aspects of disease management from diagnostic modalities to treatment. Weight loss encouraged and health benefits explained to patient.     Hyperlipidemia  - continue statin     Atrial fibrillation  - not on BB for unknown reason-- consider starting once euvoleumic  - continue eliquis      VTE Risk Mitigation (From admission, onward)         Ordered     apixaban tablet 2.5 mg  2 times daily         03/28/23 2252     Reason for No Pharmacological VTE Prophylaxis  Once        Question:  Reasons:  Answer:  Already adequately anticoagulated on oral Anticoagulants    03/28/23 1909     IP VTE HIGH RISK PATIENT  Once         03/28/23 1905     Place sequential compression device  Until discontinued         03/28/23 1905     Place sequential compression device  Until discontinued         03/28/23 1902                     On 03/28/2023, patient should be placed in hospital observation services under my care in collaboration with Dr. Aniket Prasad.      Judi Tapia PA-C  Department of Hospital Medicine  [facility] - Observation 11H

## 2023-03-29 NOTE — HOSPITAL COURSE
83 y.o. female admitted to  observation with acute on chronic diastolic HF. Patient presented with worsening of chronic generalized abdominal pain. Appeared hypervolemic, started on IV diuresis. DHEERAJ and acute cystitis noted. Treated with IV Ceftriaxone and improvement noted. Endorsed persistent cough, treated with PRNs. Echo with newly reduced EF. Patient medically stable for discharge with PCP, care at home and cardiology f/u.

## 2023-03-29 NOTE — ASSESSMENT & PLAN NOTE
- likely contributing to acute on chronic abd pain  - afebrile without leukocytosis   - Continue IV CTX 1g q24hrs  - follow UCx - pending

## 2023-03-30 VITALS
SYSTOLIC BLOOD PRESSURE: 142 MMHG | RESPIRATION RATE: 18 BRPM | OXYGEN SATURATION: 96 % | WEIGHT: 208 LBS | BODY MASS INDEX: 35.51 KG/M2 | HEART RATE: 71 BPM | HEIGHT: 64 IN | DIASTOLIC BLOOD PRESSURE: 71 MMHG | TEMPERATURE: 98 F

## 2023-03-30 LAB
ANION GAP SERPL CALC-SCNC: 14 MMOL/L (ref 8–16)
BASOPHILS # BLD AUTO: 0.04 K/UL (ref 0–0.2)
BASOPHILS NFR BLD: 0.6 % (ref 0–1.9)
BUN SERPL-MCNC: 27 MG/DL (ref 8–23)
CALCIUM SERPL-MCNC: 9.9 MG/DL (ref 8.7–10.5)
CHLORIDE SERPL-SCNC: 97 MMOL/L (ref 95–110)
CO2 SERPL-SCNC: 26 MMOL/L (ref 23–29)
CREAT SERPL-MCNC: 1.6 MG/DL (ref 0.5–1.4)
DIFFERENTIAL METHOD: ABNORMAL
EOSINOPHIL # BLD AUTO: 0.2 K/UL (ref 0–0.5)
EOSINOPHIL NFR BLD: 2.8 % (ref 0–8)
ERYTHROCYTE [DISTWIDTH] IN BLOOD BY AUTOMATED COUNT: 14.6 % (ref 11.5–14.5)
EST. GFR  (NO RACE VARIABLE): 31.8 ML/MIN/1.73 M^2
GLUCOSE SERPL-MCNC: 98 MG/DL (ref 70–110)
HCT VFR BLD AUTO: 43.8 % (ref 37–48.5)
HGB BLD-MCNC: 14 G/DL (ref 12–16)
IMM GRANULOCYTES # BLD AUTO: 0.01 K/UL (ref 0–0.04)
IMM GRANULOCYTES NFR BLD AUTO: 0.1 % (ref 0–0.5)
LYMPHOCYTES # BLD AUTO: 2.6 K/UL (ref 1–4.8)
LYMPHOCYTES NFR BLD: 37.3 % (ref 18–48)
MCH RBC QN AUTO: 30 PG (ref 27–31)
MCHC RBC AUTO-ENTMCNC: 32 G/DL (ref 32–36)
MCV RBC AUTO: 94 FL (ref 82–98)
MONOCYTES # BLD AUTO: 0.5 K/UL (ref 0.3–1)
MONOCYTES NFR BLD: 6.9 % (ref 4–15)
NEUTROPHILS # BLD AUTO: 3.7 K/UL (ref 1.8–7.7)
NEUTROPHILS NFR BLD: 52.3 % (ref 38–73)
NRBC BLD-RTO: 0 /100 WBC
PHOSPHATE SERPL-MCNC: 4.4 MG/DL (ref 2.7–4.5)
PLATELET # BLD AUTO: 208 K/UL (ref 150–450)
PMV BLD AUTO: 9.9 FL (ref 9.2–12.9)
POCT GLUCOSE: 102 MG/DL (ref 70–110)
POCT GLUCOSE: 103 MG/DL (ref 70–110)
POTASSIUM SERPL-SCNC: 4.6 MMOL/L (ref 3.5–5.1)
RBC # BLD AUTO: 4.66 M/UL (ref 4–5.4)
SODIUM SERPL-SCNC: 137 MMOL/L (ref 136–145)
WBC # BLD AUTO: 7.06 K/UL (ref 3.9–12.7)

## 2023-03-30 PROCEDURE — G0378 HOSPITAL OBSERVATION PER HR: HCPCS

## 2023-03-30 PROCEDURE — 25000003 PHARM REV CODE 250: Performed by: PHYSICIAN ASSISTANT

## 2023-03-30 PROCEDURE — 99239 PR HOSPITAL DISCHARGE DAY,>30 MIN: ICD-10-PCS | Mod: ,,,

## 2023-03-30 PROCEDURE — 84100 ASSAY OF PHOSPHORUS: CPT | Performed by: PHYSICIAN ASSISTANT

## 2023-03-30 PROCEDURE — 85025 COMPLETE CBC W/AUTO DIFF WBC: CPT | Performed by: PHYSICIAN ASSISTANT

## 2023-03-30 PROCEDURE — 80048 BASIC METABOLIC PNL TOTAL CA: CPT | Performed by: PHYSICIAN ASSISTANT

## 2023-03-30 PROCEDURE — 99239 HOSP IP/OBS DSCHRG MGMT >30: CPT | Mod: ,,,

## 2023-03-30 PROCEDURE — 96365 THER/PROPH/DIAG IV INF INIT: CPT | Mod: 59

## 2023-03-30 PROCEDURE — 63600175 PHARM REV CODE 636 W HCPCS: Performed by: PHYSICIAN ASSISTANT

## 2023-03-30 PROCEDURE — 96376 TX/PRO/DX INJ SAME DRUG ADON: CPT

## 2023-03-30 PROCEDURE — 36415 COLL VENOUS BLD VENIPUNCTURE: CPT | Performed by: PHYSICIAN ASSISTANT

## 2023-03-30 RX ORDER — CARVEDILOL 3.12 MG/1
3.12 TABLET ORAL 2 TIMES DAILY WITH MEALS
Qty: 60 TABLET | Refills: 5 | Status: SHIPPED | OUTPATIENT
Start: 2023-03-30 | End: 2024-03-29

## 2023-03-30 RX ADMIN — BUSPIRONE HYDROCHLORIDE 5 MG: 5 TABLET ORAL at 09:03

## 2023-03-30 RX ADMIN — APIXABAN 2.5 MG: 2.5 TABLET, FILM COATED ORAL at 09:03

## 2023-03-30 RX ADMIN — FUROSEMIDE 40 MG: 10 INJECTION, SOLUTION INTRAMUSCULAR; INTRAVENOUS at 09:03

## 2023-03-30 RX ADMIN — DICYCLOMINE HYDROCHLORIDE 10 MG: 10 CAPSULE ORAL at 09:03

## 2023-03-30 RX ADMIN — ACETAMINOPHEN 650 MG: 325 TABLET ORAL at 09:03

## 2023-03-30 RX ADMIN — ATORVASTATIN CALCIUM 40 MG: 40 TABLET, FILM COATED ORAL at 09:03

## 2023-03-30 RX ADMIN — CETIRIZINE HYDROCHLORIDE 10 MG: 10 TABLET, FILM COATED ORAL at 09:03

## 2023-03-30 RX ADMIN — FAMOTIDINE 20 MG: 20 TABLET, FILM COATED ORAL at 09:03

## 2023-03-30 NOTE — PLAN OF CARE
[facility] 11H  Initial Discharge Assessment    Pt has a signed dc order in the chart.

## 2023-03-30 NOTE — NURSING
Patient discharged home at this time with son. Patient brought to [employer] vehicle via patients own rollator. Patient given discharge instructions, referral for heart failure clinic, referral for cardiology apt outpatient, referral for [facility] care at home, follow up instructions, discharge medication reconciliation, and patient education on new prescription medication. Patient and son voiced no further questions. Prescription from bedside [facility] pharmacy picked up by son prior to discharge. Patient has her belongings at this time of discharge including rollator and cell phone. Patient telemetry discontinued prior to discharge.

## 2023-03-30 NOTE — PLAN OF CARE
Problem: Adult Inpatient Plan of Care  Goal: Plan of Care Review  3/30/2023 1301 by Ila Hwang RN  Outcome: Met  3/30/2023 0742 by Ila Hwang RN  Outcome: Ongoing, Progressing  Goal: Patient-Specific Goal (Individualized)  3/30/2023 1301 by Ila Hwang RN  Outcome: Met  3/30/2023 0742 by Ila Hwang RN  Outcome: Ongoing, Progressing  Goal: Absence of Hospital-Acquired Illness or Injury  3/30/2023 1301 by Ila Hwang RN  Outcome: Met  3/30/2023 0742 by Ila Hwang RN  Outcome: Ongoing, Progressing  Goal: Optimal Comfort and Wellbeing  3/30/2023 1301 by Ila Hwang RN  Outcome: Met  3/30/2023 0742 by Ila Hwang RN  Outcome: Ongoing, Progressing  Goal: Readiness for Transition of Care  3/30/2023 1301 by Ila Hwang RN  Outcome: Met  3/30/2023 0742 by Ila Hwang RN  Outcome: Ongoing, Progressing     Problem: Infection  Goal: Absence of Infection Signs and Symptoms  3/30/2023 1301 by Ila Hwang RN  Outcome: Met  3/30/2023 0742 by Ila Hwang RN  Outcome: Ongoing, Progressing     Problem: Diabetes Comorbidity  Goal: Blood Glucose Level Within Targeted Range  3/30/2023 1301 by Ila Hwang RN  Outcome: Met  3/30/2023 0742 by Ila Hwang RN  Outcome: Ongoing, Progressing     Problem: Fluid and Electrolyte Imbalance (Acute Kidney Injury/Impairment)  Goal: Fluid and Electrolyte Balance  3/30/2023 1301 by Ila Hwang RN  Outcome: Met  3/30/2023 0742 by Ila Hwang RN  Outcome: Ongoing, Progressing     Problem: Oral Intake Inadequate (Acute Kidney Injury/Impairment)  Goal: Optimal Nutrition Intake  3/30/2023 1301 by Ila Hwang RN  Outcome: Met  3/30/2023 0742 by Ila Hwang RN  Outcome: Ongoing, Progressing     Problem: Renal Function Impairment (Acute Kidney Injury/Impairment)  Goal: Effective Renal Function  3/30/2023 1301 by Ila Hwang RN  Outcome: Met  3/30/2023 0742 by Ila Hwang RN  Outcome: Ongoing, Progressing

## 2023-03-30 NOTE — PLAN OF CARE
Problem: Adult Inpatient Plan of Care  Goal: Plan of Care Review  3/30/2023 0617 by Patricia Aguillon LPN  Outcome: Ongoing, Progressing  3/30/2023 0617 by Patricia Aguillon LPN  Outcome: Ongoing, Progressing  Goal: Patient-Specific Goal (Individualized)  3/30/2023 0617 by Patricia Aguillon LPN  Outcome: Ongoing, Progressing  3/30/2023 0617 by Patricia Aguillon LPN  Outcome: Ongoing, Progressing  Goal: Absence of Hospital-Acquired Illness or Injury  3/30/2023 0617 by Patricia Aguillon LPN  Outcome: Ongoing, Progressing  3/30/2023 0617 by Patricia Aguillon LPN  Outcome: Ongoing, Progressing  Goal: Optimal Comfort and Wellbeing  3/30/2023 0617 by Patricia Aguillon LPN  Outcome: Ongoing, Progressing  3/30/2023 0617 by Patricia Aguillon LPN  Outcome: Ongoing, Progressing  Goal: Readiness for Transition of Care  3/30/2023 0617 by Patricia Aguillon LPN  Outcome: Ongoing, Progressing  3/30/2023 0617 by Patricia Aguillon LPN  Outcome: Ongoing, Progressing     Problem: Infection  Goal: Absence of Infection Signs and Symptoms  3/30/2023 0617 by Patricia Aguillon LPN  Outcome: Ongoing, Progressing  3/30/2023 0617 by Patricia Aguillon LPN  Outcome: Ongoing, Progressing     Problem: Diabetes Comorbidity  Goal: Blood Glucose Level Within Targeted Range  3/30/2023 0617 by Patricia Aguillon LPN  Outcome: Ongoing, Progressing  3/30/2023 0617 by Patricia Aguillon LPN  Outcome: Ongoing, Progressing     Problem: Fluid and Electrolyte Imbalance (Acute Kidney Injury/Impairment)  Goal: Fluid and Electrolyte Balance  3/30/2023 0617 by Patricia Aguillon LPN  Outcome: Ongoing, Progressing  3/30/2023 0617 by Patricia Aguillon LPN  Outcome: Ongoing, Progressing     Problem: Oral Intake Inadequate (Acute Kidney Injury/Impairment)  Goal: Optimal Nutrition Intake  3/30/2023 0617 by Patricia Aguillon LPN  Outcome: Ongoing, Progressing  3/30/2023 0617 by Patricia Aguillon LPN  Outcome: Ongoing, Progressing     Problem: Renal Function Impairment (Acute Kidney Injury/Impairment)  Goal: Effective Renal  Function  3/30/2023 0617 by Patricia Aguillon LPN  Outcome: Ongoing, Progressing  3/30/2023 0617 by Patricia Aguillon LPN  Outcome: Ongoing, Progressing

## 2023-03-31 NOTE — ASSESSMENT & PLAN NOTE
CKD (chronic kidney disease), stage III  - Cr 1.5 on admit, baseline ~1.2   - Cr improved to 1.4 on 3/29  - suspect 2/2 cardiorenal syndrome and UTI  - Continued IV diuresis and antibiotics  - avoid nephrotoxins, renally dose meds

## 2023-03-31 NOTE — ASSESSMENT & PLAN NOTE
Ischemic congestive cardiomyopathy  - appears volume overloaded on admit  - , CXR clear  - started IV lasix 40 mg BID - continue  - keep K>4 and Mg>2  - monitor telemetry  - strict I/Os, daily weights  - low Na diet w/ fluid restriction  - repeat TTE completed, results below   - F/u with cardiology for newly reduced EF  - GDMT for CHF:   - started coreg   - continue losartan and furosemide   - Consider starting entresto    Results for orders placed during the hospital encounter of 03/28/23    Echo    Interpretation Summary  · Mild left atrial enlargement.  · The left ventricle is normal in size with low normal systolic function.  · The estimated ejection fraction is 50-55%.  · Mild right atrial enlargement.  · Normal right ventricular size with normal right ventricular systolic function.  · Mild aortic regurgitation.  · There is aortic valve sclerosis without significant stenosis.  · Mild mitral regurgitation.  · Mild to moderate tricuspid regurgitation.  · Normal central venous pressure (3 mmHg).  · The estimated PA systolic pressure is 39 mmHg.  · The ascending aorta is mildly dilated measuring 4.0 cm.

## 2023-03-31 NOTE — ASSESSMENT & PLAN NOTE
- acute on chronic issue, likely exacerbated by vol overload and UTI   - Endorsed persistent generalized abdominal pain  - continued home bentyl and famotidine  - CT A/P with no acute findings, reviewed  - added PRN maalox and gasX

## 2023-03-31 NOTE — DISCHARGE SUMMARY
[facility] - Observation [address] [place] [address] Medicine  Discharge Summary      Patient Name: Nyasia Frye  MRN: [id number]  RAAD: [id number]  Patient Class: OP- Observation  Admission Date: 3/28/2023  Hospital Length of Stay: 0 days  Discharge Date and Time: 3/30/2023  3:03 PM  Attending Physician: Earlene att. providers found   Discharging Provider: Tamiko Beyer PA-C  Primary Care Provider: Marianne Padilla MD  [facility] Medicine Team: [facility] HOSP MED E Tamiko Beyer PA-C  Primary Care Team: [facility] HOSP MED E    HPI:   Nyasia Frye is a 83 y.o. female with a PMHx of HFpEF, AF, asthma, DM, HLD, HTN, and IBS who presents to [facility] with acute on chronic abdominal pain. Patient is a very poor historian. She reports acute worsening of her chronic generalized abdominal pain for the past few days. Symptoms are consistent with prior flares of her IBS. She notes associated abdominal distension, dyspnea on exertion, orthopnea, and bilateral lower extremity edema for the past few days. She feels that the fluid in her abdomen is exacerbating her IBS symptoms. Has chronic fluctuations of diarrhea and constipation 2/2 IBS without recent changes. Denies fever, chills, chest pain, N/V, numbness/tingling, LE pain, vision changes, HA, syncope, or new/ worsening cough.     ED: hypertensive to /84, vitals otherwise stable. No leukocytosis. Cr 1.5 (BL ~1.2), t.bili 1.3. , trop 0.046>>0.049. CXR without acute findings. CT AP negative for acute process.       * No surgery found *      Hospital Course:   83 y.o. female admitted to  observation with acute on chronic diastolic HF. Patient presented with worsening of chronic generalized abdominal pain. Appeared hypervolemic, started on IV diuresis. DHEERAJ and acute cystitis noted. Treated with IV Ceftriaxone and improvement noted. Endorsed persistent cough, treated with PRNs. Echo with newly reduced EF. Patient medically stable for discharge with PCP, care at home and cardiology f/u.        Goals of  Care Treatment Preferences:  Code Status: Full Code      Consults:     Cardiac/Vascular  * Acute on chronic diastolic heart failure  Ischemic congestive cardiomyopathy  - appears volume overloaded on admit  - , CXR clear  - started IV lasix 40 mg BID - continue  - keep K>4 and Mg>2  - monitor telemetry  - strict I/Os, daily weights  - low Na diet w/ fluid restriction  - repeat TTE completed, results below   - F/u with cardiology for newly reduced EF  - GDMT for CHF:   - started coreg   - continue losartan and furosemide   - Consider starting entresto    Results for orders placed during the hospital encounter of 03/28/23    Echo    Interpretation Summary  · Mild left atrial enlargement.  · The left ventricle is normal in size with low normal systolic function.  · The estimated ejection fraction is 50-55%.  · Mild right atrial enlargement.  · Normal right ventricular size with normal right ventricular systolic function.  · Mild aortic regurgitation.  · There is aortic valve sclerosis without significant stenosis.  · Mild mitral regurgitation.  · Mild to moderate tricuspid regurgitation.  · Normal central venous pressure (3 mmHg).  · The estimated PA systolic pressure is 39 mmHg.  · The ascending aorta is mildly dilated measuring 4.0 cm.      Renal/  Acute cystitis without hematuria  - likely contributing to acute on chronic abd pain  - afebrile without leukocytosis   - Continued IV CTX 1g q24hrs - while inpatient  - Urine culture with no growth  - No antibiotics continued on discharge    DHEERAJ (acute kidney injury)  CKD (chronic kidney disease), stage III  - Cr 1.5 on admit, baseline ~1.2   - Cr improved to 1.4 on 3/29  - suspect 2/2 cardiorenal syndrome and UTI  - Continued IV diuresis and antibiotics  - avoid nephrotoxins, renally dose meds    GI  IBS (irritable bowel syndrome)  - acute on chronic issue, likely exacerbated by vol overload and UTI   - Endorsed persistent generalized abdominal pain  - continued  home bentyl and famotidine  - CT A/P with no acute findings, reviewed  - added PRN maalox and gasX      Final Active Diagnoses:    Diagnosis Date Noted POA    PRINCIPAL PROBLEM:  Acute on chronic diastolic heart failure [I50.33] 11/12/2020 Yes    DHEERAJ (acute kidney injury) [N17.9] 03/28/2023 Yes    Acute cystitis without hematuria [N30.00] 03/28/2023 Yes    Morbid obesity [E66.01] 02/09/2021 Yes     Chronic    IBS (irritable bowel syndrome) [K58.9] 02/09/2021 Yes     Chronic    CKD (chronic kidney disease), stage III [N18.30] 11/13/2020 Yes     Chronic    ICD (implantable cardioverter-defibrillator) in place [Z95.810] 11/12/2020 Yes     Chronic    Ischemic congestive cardiomyopathy [I25.5, I42.0] 11/12/2020 Yes    Atrial fibrillation [I48.91] 03/01/2013 Yes     Chronic    Hyperlipidemia [E78.5] 03/01/2013 Yes     Chronic      Problems Resolved During this Admission:       Discharged Condition: fair    Disposition: Home or Self Care    Follow Up:   Follow-up Information     Marianne Padilla MD Follow up on 4/4/2023.    Specialty: Internal Medicine  Why: Hospital follow up visit at 3:15pm. Please bring your discharge summary, current medications, insurance card and ID  Contact information:  [address] [place] [address]  [id number]  [place] [id number]  [phone]                       Patient Instructions:      Ambulatory referral/consult to Cardiology   Standing Status: Future   Referral Priority: Routine Referral Type: Consultation   Referral Reason: Specialty Services Required   Requested Specialty: Cardiology   Number of Visits Requested: 1     Ambulatory referral/consult to [facility]   Standing Status: Future   Referral Priority: Routine Referral Type: Consultation   Referral Reason: Specialty Services Required   Number of Visits Requested: 1     Diet diabetic     Activity as tolerated       Significant Diagnostic Studies: Microbiology:   Urine Culture    Lab Results    Component Value Date    LABURIN No significant growth 2023       Pending Diagnostic Studies:     None         Medications:  Reconciled Home Medications:      Medication List      START taking these medications    carvediloL 3.125 MG tablet  Commonly known as: COREG  Take 1 tablet (3.125 mg total) by mouth 2 (two) times daily with meals.        CONTINUE taking these medications    acetaminophen 500 MG tablet  Commonly known as: TYLENOL  Take 1 tablet (500 mg total) by mouth every 6 (six) hours as needed for Pain (and fever).     albuterol 90 mcg/actuation inhaler  Commonly known as: PROVENTIL/VENTOLIN HFA  Inhale 2 puffs into the lungs.     atorvastatin 40 MG tablet  Commonly known as: LIPITOR  Take 40 mg by mouth.     busPIRone 5 MG Tab  Commonly known as: BUSPAR  Take 5 mg by mouth.     denosumab 60 mg/mL Syrg  Commonly known as: PROLIA  Inject 1 mL (60 mg total) into the skin every 6 (six) months.     diclofenac sodium 1 % Gel  Commonly known as: VOLTAREN ARTHRITIS PAIN  Apply 2 g topically once daily.     dicyclomine 10 MG capsule  Commonly known as: BENTYL     docosahexaenoic acid-epa 120-180 mg Cap  Take 2 capsules by mouth.     econazole nitrate 1 % cream  Apply topically 2 (two) times daily.     ELIQUIS 2.5 mg Tab  Generic drug: apixaban  Take 2.5 mg by mouth 2 (two) times daily.     ergocalciferol 50,000 unit Cap  Commonly known as: ERGOCALCIFEROL  SMARTSI Capsule(s) By Mouth     famotidine 20 MG tablet  Commonly known as: PEPCID  Take 1 tablet (20 mg total) by mouth 2 (two) times daily.     febuxostat 40 mg Tab  Commonly known as: ULORIC  Take 20 mg by mouth.     fish oil-omega-3 fatty acids 300-1,000 mg capsule  Take 2 g by mouth once daily.     fluticasone propionate 50 mcg/actuation nasal spray  Commonly known as: FLONASE  1 spray (50 mcg total) by Each Nostril route 2 (two) times daily.     furosemide 40 MG tablet  Commonly known as: LASIX  Take 40 mg by mouth 2 (two) times daily.      GAVILYTE-C 240-22.72-6.72 -5.84 gram Solr  Generic drug: polyethylene glycol  USE AS DIRECTED     glucagon 1 mg Solr  Inject as directed.     JARDIANCE 25 mg tablet  Generic drug: empagliflozin  Take 25 mg by mouth once daily.     levalbuterol 1.25 mg/3 mL nebulizer solution  Commonly known as: XOPENEX  Take 3 mLs (1.25 mg total) by nebulization every 6 (six) hours as needed for Wheezing. Rescue     loratadine 10 mg tablet  Commonly known as: CLARITIN  Take 1 tablet (10 mg total) by mouth once daily.     losartan 100 MG tablet  Commonly known as: COZAAR  Take 100 mg by mouth once daily.     multivitamin with minerals tablet  Take by mouth.     mv-min-folic acid-lutein 500-250 mcg Chew  Take 1 tablet by mouth.     nitroGLYCERIN 0.4 MG SL tablet  Commonly known as: NITROSTAT  Place 1 tablet (0.4 mg total) under the tongue every 5 (five) minutes as needed for Chest pain.     olopatadine 0.1 % ophthalmic solution  Commonly known as: PATANOL  1 drop.     pantoprazole 20 MG tablet  Commonly known as: PROTONIX  Take 20 mg by mouth every morning.     pravastatin 40 MG tablet  Commonly known as: PRAVACHOL  Take 40 mg by mouth once daily.     predniSONE 20 MG tablet  Commonly known as: DELTASONE  Take 2 tablets (40 mg total) by mouth once daily. for 5 days     ranitidine 150 MG capsule  Commonly known as: ZANTAC  Take 150 mg by mouth.     TRADJENTA 5 mg Tab tablet  Generic drug: linaGLIPtin  TAKE ONE(1) TABLET BY MOUTH EVERY DAY     TRESIBA FLEXTOUCH U-100 100 unit/mL (3 mL) insulin pen  Generic drug: insulin degludec  SMARTSI Unit(s) SUB-Q Every Morning            Indwelling Lines/Drains at time of discharge:   Lines/Drains/Airways     None                 Time spent on the discharge of patient: 40 minutes         Tamiko Beyer PA-C  Department of Hospital Medicine  [facility]

## 2023-03-31 NOTE — ASSESSMENT & PLAN NOTE
- likely contributing to acute on chronic abd pain  - afebrile without leukocytosis   - Continued IV CTX 1g q24hrs - while inpatient  - Urine culture with no growth  - No antibiotics continued on discharge

## 2023-04-03 ENCOUNTER — PES CALL (OUTPATIENT)
Dept: ADMINISTRATIVE | Facility: CLINIC | Age: 83
End: 2023-04-03
Payer: MEDICARE

## 2023-04-03 ENCOUNTER — TELEPHONE (OUTPATIENT)
Dept: CARDIOLOGY | Facility: CLINIC | Age: 83
End: 2023-04-03
Payer: MEDICARE

## 2023-04-03 NOTE — TELEPHONE ENCOUNTER
"[facility] RN spoke with pt's son Ken, who reports a better contact number for his mother is [phone]. RN able to reach pt at above number, pt requested a callback as she is getting ready for another doctor's appt today." RN will attempt to contact pt on 4/4/23 for discharge f/u call.   "

## 2023-04-04 ENCOUNTER — TELEPHONE (OUTPATIENT)
Dept: CARDIOLOGY | Facility: CLINIC | Age: 83
End: 2023-04-04
Payer: MEDICARE

## 2023-04-04 ENCOUNTER — PES CALL (OUTPATIENT)
Dept: ADMINISTRATIVE | Facility: CLINIC | Age: 83
End: 2023-04-04
Payer: MEDICARE

## 2023-04-04 NOTE — TELEPHONE ENCOUNTER
"[facility]    Attempted to call pt to complete 24-72hour post discharge "check in" call. Unable to reach pt at listed phone numbers.  Was unable to leave message with family nor on voicemail. Voicemail not set up or full.  Phone call answered and then disconnected.      Will continue to try to reach patient.    "

## 2023-04-05 ENCOUNTER — PES CALL (OUTPATIENT)
Dept: ADMINISTRATIVE | Facility: CLINIC | Age: 83
End: 2023-04-05
Payer: MEDICARE

## 2023-04-06 ENCOUNTER — TELEPHONE (OUTPATIENT)
Dept: CARDIOLOGY | Facility: CLINIC | Age: 83
End: 2023-04-06
Payer: MEDICARE

## 2023-04-06 NOTE — TELEPHONE ENCOUNTER
"[facility]    Attempted to call pt to complete 24-72hour post discharge "check in" call. Unable to reach pt at listed phone numbers.  Was able to leave message on voicemail encouraging pt to return call with [facility] phone number. Pt needs to be scheduled for initial visit with [facility]. RN will attempt to contact pt one more time.     Will continue to try to reach patient.    "

## 2023-04-07 ENCOUNTER — TELEPHONE (OUTPATIENT)
Dept: ADMINISTRATIVE | Facility: CLINIC | Age: 83
End: 2023-04-07
Payer: MEDICARE

## 2023-04-07 NOTE — TELEPHONE ENCOUNTER
Patient will reach out in the future if she needs help. She was inquiring about the resources more less.

## 2023-04-10 ENCOUNTER — TELEPHONE (OUTPATIENT)
Dept: CARDIOLOGY | Facility: CLINIC | Age: 83
End: 2023-04-10
Payer: MEDICARE

## 2023-04-10 NOTE — TELEPHONE ENCOUNTER
Call to assess PT's interest in rescheduling her missed appointment with the [facility]. Was able to Leave a message on her Home # but cell stops ringing but gives no beep or option to leave a VM. Will discuss with Provider whether to pursue further.

## 2023-04-17 ENCOUNTER — TELEPHONE (OUTPATIENT)
Dept: CARDIOLOGY | Facility: CLINIC | Age: 83
End: 2023-04-17
Payer: MEDICARE

## 2023-05-02 ENCOUNTER — TELEPHONE (OUTPATIENT)
Dept: CARDIOLOGY | Facility: CLINIC | Age: 83
End: 2023-05-02
Payer: MEDICARE

## 2023-05-02 NOTE — TELEPHONE ENCOUNTER
Unable to reach Pt due to phone problems, LM on Home # with nor return call, Unable to LM on cell #. Therefore we will Discharge this Pt from our [facility] as Pt no showed appt and we could not reach them to reschedule or speak with them.

## 2023-05-03 ENCOUNTER — HOSPITAL ENCOUNTER (OUTPATIENT)
Facility: HOSPITAL | Age: 83
Discharge: HOME HEALTH | End: 2023-05-05
Attending: STUDENT IN AN ORGANIZED HEALTH CARE EDUCATION/TRAINING PROGRAM | Admitting: HOSPITALIST
Payer: MEDICARE

## 2023-05-03 DIAGNOSIS — R29.818 TRANSIENT NEUROLOGIC DEFICIT: ICD-10-CM

## 2023-05-03 DIAGNOSIS — F41.9 ANXIETY: Primary | ICD-10-CM

## 2023-05-03 DIAGNOSIS — R29.90 EPISODE OF TRANSIENT NEUROLOGIC SYMPTOMS: ICD-10-CM

## 2023-05-03 DIAGNOSIS — R42 DIZZINESS: ICD-10-CM

## 2023-05-03 DIAGNOSIS — I67.1 ANEURYSM OF MIDDLE CEREBRAL ARTERY: ICD-10-CM

## 2023-05-03 DIAGNOSIS — I63.9 STROKE: ICD-10-CM

## 2023-05-03 DIAGNOSIS — R07.9 CHEST PAIN: ICD-10-CM

## 2023-05-03 PROBLEM — M85.80 OSTEOPENIA: Status: ACTIVE | Noted: 2021-10-26

## 2023-05-03 PROBLEM — E11.21 DIABETIC NEPHROPATHY ASSOCIATED WITH TYPE 2 DIABETES MELLITUS: Status: ACTIVE | Noted: 2020-11-12

## 2023-05-03 PROBLEM — E87.6 HYPOKALEMIA: Status: ACTIVE | Noted: 2023-05-03

## 2023-05-03 PROBLEM — N18.32 STAGE 3B CHRONIC KIDNEY DISEASE: Status: ACTIVE | Noted: 2020-11-13

## 2023-05-03 PROBLEM — U07.1 COVID: Status: RESOLVED | Noted: 2022-09-02 | Resolved: 2023-05-03

## 2023-05-03 LAB
ALBUMIN SERPL BCP-MCNC: 3.7 G/DL (ref 3.5–5.2)
ALP SERPL-CCNC: 78 U/L (ref 55–135)
ALT SERPL W/O P-5'-P-CCNC: 24 U/L (ref 10–44)
ANION GAP SERPL CALC-SCNC: 6 MMOL/L (ref 8–16)
AST SERPL-CCNC: 24 U/L (ref 10–40)
BACTERIA #/AREA URNS AUTO: ABNORMAL /HPF
BASOPHILS # BLD AUTO: 0.02 K/UL (ref 0–0.2)
BASOPHILS NFR BLD: 0.4 % (ref 0–1.9)
BILIRUB SERPL-MCNC: 1.1 MG/DL (ref 0.1–1)
BILIRUB UR QL STRIP: NEGATIVE
BUN SERPL-MCNC: 22 MG/DL (ref 8–23)
CALCIUM SERPL-MCNC: 9.6 MG/DL (ref 8.7–10.5)
CHLORIDE SERPL-SCNC: 105 MMOL/L (ref 95–110)
CHOLEST SERPL-MCNC: 134 MG/DL (ref 120–199)
CHOLEST/HDLC SERPL: 2.4 {RATIO} (ref 2–5)
CLARITY UR REFRACT.AUTO: CLEAR
CO2 SERPL-SCNC: 30 MMOL/L (ref 23–29)
COLOR UR AUTO: YELLOW
CREAT SERPL-MCNC: 1.4 MG/DL (ref 0.5–1.4)
CREAT SERPL-MCNC: 1.6 MG/DL (ref 0.5–1.4)
DIFFERENTIAL METHOD: ABNORMAL
EOSINOPHIL # BLD AUTO: 0.1 K/UL (ref 0–0.5)
EOSINOPHIL NFR BLD: 1.4 % (ref 0–8)
ERYTHROCYTE [DISTWIDTH] IN BLOOD BY AUTOMATED COUNT: 14.8 % (ref 11.5–14.5)
EST. GFR  (NO RACE VARIABLE): 37.3 ML/MIN/1.73 M^2
GLUCOSE SERPL-MCNC: 136 MG/DL (ref 70–110)
GLUCOSE UR QL STRIP: ABNORMAL
HCT VFR BLD AUTO: 39.1 % (ref 37–48.5)
HDLC SERPL-MCNC: 55 MG/DL (ref 40–75)
HDLC SERPL: 41 % (ref 20–50)
HGB BLD-MCNC: 12.7 G/DL (ref 12–16)
HGB UR QL STRIP: ABNORMAL
IMM GRANULOCYTES # BLD AUTO: 0.01 K/UL (ref 0–0.04)
IMM GRANULOCYTES NFR BLD AUTO: 0.2 % (ref 0–0.5)
INR PPP: 1.1 (ref 0.8–1.2)
KETONES UR QL STRIP: NEGATIVE
LDLC SERPL CALC-MCNC: 62.6 MG/DL (ref 63–159)
LEUKOCYTE ESTERASE UR QL STRIP: ABNORMAL
LYMPHOCYTES # BLD AUTO: 1.7 K/UL (ref 1–4.8)
LYMPHOCYTES NFR BLD: 30.9 % (ref 18–48)
MCH RBC QN AUTO: 30.3 PG (ref 27–31)
MCHC RBC AUTO-ENTMCNC: 32.5 G/DL (ref 32–36)
MCV RBC AUTO: 93 FL (ref 82–98)
MICROSCOPIC COMMENT: ABNORMAL
MONOCYTES # BLD AUTO: 0.4 K/UL (ref 0.3–1)
MONOCYTES NFR BLD: 6.6 % (ref 4–15)
NEUTROPHILS # BLD AUTO: 3.4 K/UL (ref 1.8–7.7)
NEUTROPHILS NFR BLD: 60.5 % (ref 38–73)
NITRITE UR QL STRIP: NEGATIVE
NON-SQ EPI CELLS #/AREA URNS AUTO: 1 /HPF
NONHDLC SERPL-MCNC: 79 MG/DL
NRBC BLD-RTO: 0 /100 WBC
PH UR STRIP: 6 [PH] (ref 5–8)
PLATELET # BLD AUTO: 194 K/UL (ref 150–450)
PMV BLD AUTO: 9.6 FL (ref 9.2–12.9)
POC PTINR: 1.5 (ref 0.9–1.2)
POC PTWBT: 17.4 SEC (ref 9.7–14.3)
POCT GLUCOSE: 165 MG/DL (ref 70–110)
POTASSIUM SERPL-SCNC: 3.2 MMOL/L (ref 3.5–5.1)
PROT SERPL-MCNC: 6.5 G/DL (ref 6–8.4)
PROT UR QL STRIP: NEGATIVE
PROTHROMBIN TIME: 11.7 SEC (ref 9–12.5)
RBC # BLD AUTO: 4.19 M/UL (ref 4–5.4)
RBC #/AREA URNS AUTO: 2 /HPF (ref 0–4)
SAMPLE: ABNORMAL
SAMPLE: ABNORMAL
SODIUM SERPL-SCNC: 141 MMOL/L (ref 136–145)
SP GR UR STRIP: >1.03 (ref 1–1.03)
SQUAMOUS #/AREA URNS AUTO: 0 /HPF
TRIGL SERPL-MCNC: 82 MG/DL (ref 30–150)
TSH SERPL DL<=0.005 MIU/L-ACNC: 0.44 UIU/ML (ref 0.4–4)
URN SPEC COLLECT METH UR: ABNORMAL
WBC # BLD AUTO: 5.6 K/UL (ref 3.9–12.7)
WBC #/AREA URNS AUTO: 15 /HPF (ref 0–5)

## 2023-05-03 PROCEDURE — 82565 ASSAY OF CREATININE: CPT

## 2023-05-03 PROCEDURE — G0378 HOSPITAL OBSERVATION PER HR: HCPCS

## 2023-05-03 PROCEDURE — 85025 COMPLETE CBC W/AUTO DIFF WBC: CPT | Performed by: STUDENT IN AN ORGANIZED HEALTH CARE EDUCATION/TRAINING PROGRAM

## 2023-05-03 PROCEDURE — 87086 URINE CULTURE/COLONY COUNT: CPT | Performed by: EMERGENCY MEDICINE

## 2023-05-03 PROCEDURE — 80061 LIPID PANEL: CPT | Performed by: STUDENT IN AN ORGANIZED HEALTH CARE EDUCATION/TRAINING PROGRAM

## 2023-05-03 PROCEDURE — 99285 PR EMERGENCY DEPT VISIT,LEVEL V: ICD-10-PCS | Mod: ,,, | Performed by: STUDENT IN AN ORGANIZED HEALTH CARE EDUCATION/TRAINING PROGRAM

## 2023-05-03 PROCEDURE — 82962 GLUCOSE BLOOD TEST: CPT

## 2023-05-03 PROCEDURE — 99900035 HC TECH TIME PER 15 MIN (STAT)

## 2023-05-03 PROCEDURE — 85610 PROTHROMBIN TIME: CPT

## 2023-05-03 PROCEDURE — 84443 ASSAY THYROID STIM HORMONE: CPT | Performed by: STUDENT IN AN ORGANIZED HEALTH CARE EDUCATION/TRAINING PROGRAM

## 2023-05-03 PROCEDURE — 81001 URINALYSIS AUTO W/SCOPE: CPT | Performed by: EMERGENCY MEDICINE

## 2023-05-03 PROCEDURE — 25000003 PHARM REV CODE 250: Performed by: HOSPITALIST

## 2023-05-03 PROCEDURE — 25500020 PHARM REV CODE 255: Performed by: STUDENT IN AN ORGANIZED HEALTH CARE EDUCATION/TRAINING PROGRAM

## 2023-05-03 PROCEDURE — 99223 PR INITIAL HOSPITAL CARE,LEVL III: ICD-10-PCS | Mod: ,,, | Performed by: HOSPITALIST

## 2023-05-03 PROCEDURE — 99285 EMERGENCY DEPT VISIT HI MDM: CPT | Mod: ,,, | Performed by: STUDENT IN AN ORGANIZED HEALTH CARE EDUCATION/TRAINING PROGRAM

## 2023-05-03 PROCEDURE — 85610 PROTHROMBIN TIME: CPT | Performed by: STUDENT IN AN ORGANIZED HEALTH CARE EDUCATION/TRAINING PROGRAM

## 2023-05-03 PROCEDURE — 99204 PR OFFICE/OUTPT VISIT, NEW, LEVL IV, 45-59 MIN: ICD-10-PCS | Mod: ,,, | Performed by: PSYCHIATRY & NEUROLOGY

## 2023-05-03 PROCEDURE — 25000003 PHARM REV CODE 250: Performed by: EMERGENCY MEDICINE

## 2023-05-03 PROCEDURE — 80053 COMPREHEN METABOLIC PANEL: CPT | Performed by: STUDENT IN AN ORGANIZED HEALTH CARE EDUCATION/TRAINING PROGRAM

## 2023-05-03 PROCEDURE — 99223 1ST HOSP IP/OBS HIGH 75: CPT | Mod: ,,, | Performed by: HOSPITALIST

## 2023-05-03 PROCEDURE — 99204 OFFICE O/P NEW MOD 45 MIN: CPT | Mod: ,,, | Performed by: PSYCHIATRY & NEUROLOGY

## 2023-05-03 PROCEDURE — 99285 EMERGENCY DEPT VISIT HI MDM: CPT | Mod: 25

## 2023-05-03 RX ORDER — NALOXONE HCL 0.4 MG/ML
0.02 VIAL (ML) INJECTION
Status: DISCONTINUED | OUTPATIENT
Start: 2023-05-04 | End: 2023-05-05 | Stop reason: HOSPADM

## 2023-05-03 RX ORDER — LOSARTAN POTASSIUM 50 MG/1
100 TABLET ORAL DAILY
Status: DISCONTINUED | OUTPATIENT
Start: 2023-05-04 | End: 2023-05-05 | Stop reason: HOSPADM

## 2023-05-03 RX ORDER — FUROSEMIDE 40 MG/1
40 TABLET ORAL 2 TIMES DAILY
Status: DISCONTINUED | OUTPATIENT
Start: 2023-05-04 | End: 2023-05-05 | Stop reason: HOSPADM

## 2023-05-03 RX ORDER — PANTOPRAZOLE SODIUM 20 MG/1
20 TABLET, DELAYED RELEASE ORAL EVERY MORNING
Status: DISCONTINUED | OUTPATIENT
Start: 2023-05-04 | End: 2023-05-05 | Stop reason: HOSPADM

## 2023-05-03 RX ORDER — NITROGLYCERIN 0.4 MG/1
0.4 TABLET SUBLINGUAL EVERY 5 MIN PRN
Status: DISCONTINUED | OUTPATIENT
Start: 2023-05-04 | End: 2023-05-05 | Stop reason: HOSPADM

## 2023-05-03 RX ORDER — BUSPIRONE HYDROCHLORIDE 5 MG/1
5 TABLET ORAL 2 TIMES DAILY
Status: DISCONTINUED | OUTPATIENT
Start: 2023-05-04 | End: 2023-05-05 | Stop reason: HOSPADM

## 2023-05-03 RX ORDER — CHOLECALCIFEROL (VITAMIN D3) 25 MCG
1000 TABLET ORAL DAILY
Status: DISCONTINUED | OUTPATIENT
Start: 2023-05-04 | End: 2023-05-05 | Stop reason: HOSPADM

## 2023-05-03 RX ORDER — INSULIN ASPART 100 [IU]/ML
0-5 INJECTION, SOLUTION INTRAVENOUS; SUBCUTANEOUS
Status: DISCONTINUED | OUTPATIENT
Start: 2023-05-04 | End: 2023-05-05 | Stop reason: HOSPADM

## 2023-05-03 RX ORDER — CARVEDILOL 3.12 MG/1
3.12 TABLET ORAL 2 TIMES DAILY WITH MEALS
Status: DISCONTINUED | OUTPATIENT
Start: 2023-05-03 | End: 2023-05-05 | Stop reason: HOSPADM

## 2023-05-03 RX ORDER — POLYETHYLENE GLYCOL 3350 17 G/17G
17 POWDER, FOR SOLUTION ORAL 2 TIMES DAILY PRN
Status: DISCONTINUED | OUTPATIENT
Start: 2023-05-04 | End: 2023-05-05 | Stop reason: HOSPADM

## 2023-05-03 RX ORDER — PROCHLORPERAZINE EDISYLATE 5 MG/ML
5 INJECTION INTRAMUSCULAR; INTRAVENOUS EVERY 6 HOURS PRN
Status: DISCONTINUED | OUTPATIENT
Start: 2023-05-04 | End: 2023-05-05 | Stop reason: HOSPADM

## 2023-05-03 RX ORDER — DEXTROSE 40 %
15 GEL (GRAM) ORAL
Status: DISCONTINUED | OUTPATIENT
Start: 2023-05-04 | End: 2023-05-05 | Stop reason: HOSPADM

## 2023-05-03 RX ORDER — VIT C/E/ZN/COPPR/LUTEIN/ZEAXAN 250MG-90MG
1000 CAPSULE ORAL DAILY
COMMUNITY

## 2023-05-03 RX ORDER — SODIUM CHLORIDE 0.9 % (FLUSH) 0.9 %
10 SYRINGE (ML) INJECTION EVERY 6 HOURS PRN
Status: DISCONTINUED | OUTPATIENT
Start: 2023-05-04 | End: 2023-05-05 | Stop reason: HOSPADM

## 2023-05-03 RX ORDER — TALC
6 POWDER (GRAM) TOPICAL NIGHTLY PRN
Status: DISCONTINUED | OUTPATIENT
Start: 2023-05-04 | End: 2023-05-05 | Stop reason: HOSPADM

## 2023-05-03 RX ORDER — ATORVASTATIN CALCIUM 40 MG/1
40 TABLET, FILM COATED ORAL DAILY
Status: DISCONTINUED | OUTPATIENT
Start: 2023-05-04 | End: 2023-05-05 | Stop reason: HOSPADM

## 2023-05-03 RX ORDER — DICYCLOMINE HYDROCHLORIDE 10 MG/1
10 CAPSULE ORAL
Status: DISCONTINUED | OUTPATIENT
Start: 2023-05-04 | End: 2023-05-05 | Stop reason: HOSPADM

## 2023-05-03 RX ORDER — ONDANSETRON 2 MG/ML
4 INJECTION INTRAMUSCULAR; INTRAVENOUS EVERY 8 HOURS PRN
Status: DISCONTINUED | OUTPATIENT
Start: 2023-05-04 | End: 2023-05-05 | Stop reason: HOSPADM

## 2023-05-03 RX ORDER — POTASSIUM CHLORIDE 20 MEQ/1
20 TABLET, EXTENDED RELEASE ORAL ONCE
Status: COMPLETED | OUTPATIENT
Start: 2023-05-03 | End: 2023-05-03

## 2023-05-03 RX ORDER — AMOXICILLIN 250 MG
1 CAPSULE ORAL DAILY
Status: DISCONTINUED | OUTPATIENT
Start: 2023-05-04 | End: 2023-05-05 | Stop reason: HOSPADM

## 2023-05-03 RX ORDER — OMEGA-3/DHA/EPA/FISH OIL 300-1000MG
2 CAPSULE,DELAYED RELEASE (ENTERIC COATED) ORAL DAILY
Status: DISCONTINUED | OUTPATIENT
Start: 2023-05-04 | End: 2023-05-05 | Stop reason: HOSPADM

## 2023-05-03 RX ORDER — FLUTICASONE PROPIONATE 50 MCG
1 SPRAY, SUSPENSION (ML) NASAL 2 TIMES DAILY
Status: DISCONTINUED | OUTPATIENT
Start: 2023-05-04 | End: 2023-05-05 | Stop reason: HOSPADM

## 2023-05-03 RX ORDER — GLUCAGON 1 MG
1 KIT INJECTION
Status: DISCONTINUED | OUTPATIENT
Start: 2023-05-04 | End: 2023-05-05 | Stop reason: HOSPADM

## 2023-05-03 RX ORDER — DEXTROSE 40 %
30 GEL (GRAM) ORAL
Status: DISCONTINUED | OUTPATIENT
Start: 2023-05-04 | End: 2023-05-05 | Stop reason: HOSPADM

## 2023-05-03 RX ORDER — ALBUTEROL SULFATE 90 UG/1
2 AEROSOL, METERED RESPIRATORY (INHALATION) EVERY 6 HOURS PRN
Status: DISCONTINUED | OUTPATIENT
Start: 2023-05-04 | End: 2023-05-05 | Stop reason: HOSPADM

## 2023-05-03 RX ORDER — ACETAMINOPHEN 325 MG/1
650 TABLET ORAL EVERY 4 HOURS PRN
Status: DISCONTINUED | OUTPATIENT
Start: 2023-05-04 | End: 2023-05-05 | Stop reason: HOSPADM

## 2023-05-03 RX ORDER — CYCLOSPORINE 0.5 MG/ML
1 EMULSION OPHTHALMIC EVERY 12 HOURS
COMMUNITY
Start: 2022-11-23

## 2023-05-03 RX ADMIN — CARVEDILOL 3.12 MG: 3.12 TABLET, FILM COATED ORAL at 10:05

## 2023-05-03 RX ADMIN — APIXABAN 2.5 MG: 2.5 TABLET, FILM COATED ORAL at 11:05

## 2023-05-03 RX ADMIN — IOHEXOL 100 ML: 350 INJECTION, SOLUTION INTRAVENOUS at 01:05

## 2023-05-03 RX ADMIN — POTASSIUM CHLORIDE 20 MEQ: 1500 TABLET, EXTENDED RELEASE ORAL at 06:05

## 2023-05-03 NOTE — ED PROVIDER NOTES
"Encounter Date: 5/3/2023       History     Chief Complaint   Patient presents with    Dizziness     Pt reports "drooling off and on" for a couple of weeks; states her "tongue feels heavy." Also endorsing dizziness, headache.     83 PMHx of HFpEF, AF, asthma, DM, HLD, HTN, and IBS presents with dysarthria that occurred approximately 30 minutes ago.  She says that she was riding something on a piece of paper when she 1st noticed heaviness to her tongue tried to talk and noticed that her sound was off.  She called her son and he could not understand her on the phone due to mumbled speech.  She says that she feels globally weak to her entire body.  No unilateral symptoms.  No numbness or paresthesias elsewhere.  No headache, nausea or vomiting.  She says that she feels anxious now in his worried if she is having a stroke.  Family member at bedside says the patient is having a stroke in his very concerned.  Patient has never had a stroke in the past. There is a mention by nursing notes for tongue swelling and drooling - pt denies new drugs, medications, exposures, etc. No rash.     Review of patient's allergies indicates:   Allergen Reactions    Beta-adrenergic agents     Ciprofloxacin Other (See Comments)    Clindamycin      rash    Metformin     Penicillins      Eye swelling     Verapamil     Zyloprim [allopurinol]      Past Medical History:   Diagnosis Date    *Atrial fibrillation     Allergy     Atrial fibrillation     Controlled gout     Diabetes mellitus type II     Hypercholesteremia     Hyperlipidemia     Hypertension     Irritable bowel syndrome (IBS)     Osteoporosis, senile      Past Surgical History:   Procedure Laterality Date    CARDIAC DEFIBRILLATOR PLACEMENT      OOPHORECTOMY      TUBAL LIGATION       Family History   Problem Relation Age of Onset    Breast cancer Sister     Colon cancer Sister     Ovarian cancer Neg Hx      Social History     Tobacco Use    Smoking status: Former     Types: Cigarettes    " Smokeless tobacco: Never   Substance Use Topics    Alcohol use: No    Drug use: No     Review of Systems   All other systems reviewed and are negative.    Physical Exam     Initial Vitals [05/03/23 1250]   BP Pulse Resp Temp SpO2   (!) 172/81 74 18 98.3 °F (36.8 °C) 99 %      MAP       --         Physical Exam    Nursing note and vitals reviewed.  Constitutional:   Anxious, tearful, dysarthric   HENT:   Head: Normocephalic and atraumatic.   Eyes: EOM are normal. Pupils are equal, round, and reactive to light.   Neck: Neck supple. No JVD present.   Normal range of motion.  Cardiovascular:  Normal rate and regular rhythm.           Pulmonary/Chest: Breath sounds normal. No stridor. No respiratory distress.   Abdominal: Abdomen is soft. There is no abdominal tenderness.   Musculoskeletal:         General: No edema. Normal range of motion.      Cervical back: Normal range of motion and neck supple.     Neurological: She is alert and oriented to person, place, and time.   Dysarthric, but no facial droop. Globally motor and sensory intact. Did not ambulate.    Skin: Skin is warm and dry. Capillary refill takes less than 2 seconds.   Psychiatric: She has a normal mood and affect. Thought content normal.       ED Course   Procedures  Labs Reviewed   CBC W/ AUTO DIFFERENTIAL - Abnormal; Notable for the following components:       Result Value    RDW 14.8 (*)     All other components within normal limits   COMPREHENSIVE METABOLIC PANEL - Abnormal; Notable for the following components:    Potassium 3.2 (*)     CO2 30 (*)     Glucose 136 (*)     Total Bilirubin 1.1 (*)     Anion Gap 6 (*)     eGFR 37.3 (*)     All other components within normal limits   LIPID PANEL - Abnormal; Notable for the following components:    LDL Cholesterol 62.6 (*)     All other components within normal limits   POCT GLUCOSE - Abnormal; Notable for the following components:    POCT Glucose 165 (*)     All other components within normal limits   ISTAT  CREATININE - Abnormal; Notable for the following components:    POC Creatinine 1.6 (*)     All other components within normal limits   ISTAT PROCEDURE - Abnormal; Notable for the following components:    POC PTWBT 17.4 (*)     POC PTINR 1.5 (*)     All other components within normal limits   PROTIME-INR   TSH   POCT GLUCOSE, HAND-HELD DEVICE          Imaging Results              CTA STROKE MULTI-PHASE (Final result)  Result time 05/03/23 13:54:58      Final result by Castro Killian MD (05/03/23 13:54:58)                   Impression:      CT head shows no acute intracranial hemorrhage or CT evidence of acute major vascular territory infarct.    CTA shows no high-grade stenosis or large vessel occlusion.    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      Electronically signed by: Castro Killian MD  Date:    05/03/2023  Time:    13:54               Narrative:    EXAMINATION:  CTA STROKE MULTI-PHASE    CLINICAL HISTORY:  Neuro deficit, acute, stroke suspected;    TECHNIQUE:  Non contrast low dose axial images were obtained thought the head. CT angiogram was performed from the level of the fior to the top of the head following the IV administration of 100mL of Omnipaque 350.   Sagittal and coronal reconstructions and maximum intensity projection reconstructions were performed. Arterial stenosis percentages are based on NASCET measurement criteria.  Two additional phases of immediate post-contrast CTA images were performed through the head alone.    COMPARISON:  None    FINDINGS:  CT head: No midline shift, hydrocephalus or mass effect.  No acute intracranial hemorrhage or CT evidence of acute major vascular territory infarct.  No abnormal extra-axial fluid collections.    CTA head and neck:    Moderate atherosclerosis of the aortic arch.  Marked tortuosity of the aortic branch vessels which are patent.  There is significant artifact from left chest wall AICD.  Small venous collaterals in the left chest  wall and paraspinal regions.    Bilateral common carotid arteries are patent.  There is mild atherosclerosis at the carotid bifurcations without significant stenosis.  Medial deviation of the left carotid artery.  Cervical internal carotid arteries are tortuous bilaterally but patent.  Petrous, cavernous and supraclinoid segments of the internal carotid arteries are patent.  Small (3 mm) wide neck outpouching at the left MCA bifurcation likely an incidental aneurysm.  The major anterior and middle cerebral artery branches are patent.    Vertebral arteries are patent bilaterally.  Basilar and major posterior cerebral artery branches are patent.    The major dural venous sinuses are patent.    Nonvascular structures: Orbits show no significant abnormalities.  Parotid, submandibular and thyroid gland shows no significant abnormalities.  Heart is enlarged.  Partially visualized AICD.  There are coronary artery calcifications.  Visualized portions of the lung apices show no significant abnormalities.    Osseous structures show no acute abnormalities.                                       Medications   iohexoL (OMNIPAQUE 350) injection 100 mL (100 mLs Intravenous Given 5/3/23 1330)     Medical Decision Making:   Initial Assessment:   Hemodynamically stable. Afebrile. Phonating and protecting the airway spontaneously. No clinical evidence for cardiovascular instability or impending airway compromise. Examination as above. Dysarthric on examination. Patient is very anxious which complicates examination. She is worried she is having a stroke as is family at bedside. Due to her presentation, time of onset, will activate stroke code.            ED Course as of 05/03/23 1508   Wed May 03, 2023   1311 Twelve lead EKG per my independent interpretation reveals ventricular paced rhythm at 71.  No regional ST segment elevation. [BG]   1402 CTA reviewed.  [BG]   1458 Spoke with vascular neurology. Requested formal MRI. Will sign out  to oncoming provider.  [BG]      ED Course User Index  [BG] Trino King MD                 Clinical Impression:   Final diagnoses:  [R42] Dizziness  [I63.9] Stroke               Trino King MD  05/03/23 1578

## 2023-05-03 NOTE — PROVIDER PROGRESS NOTES - EMERGENCY DEPT.
Encounter Date: 5/3/2023    ED Physician Progress Notes        Physician Note:   ED Physician Hand-off Note:    ED Course: I assumed care of patient from off-going ED physician team. Briefly, Patient is a 83 PMHx of HFpEF, AF, asthma, DM, HLD, HTN, and IBS here for aphasia.    At the time of signout plan was pending MRI, vascular neurology recommendations.    Medications given in the ED:    Medications  iohexoL (OMNIPAQUE 350) injection 100 mL (100 mLs Intravenous Given 5/3/23 1330)     6:30 PM  Patient was brought to MRI, unfortunately we were unaware that the patient had a cardiac device.  This will need to be programmed off in order to have MRI.  This will not be able to be performed at this time.  Vascular Neurology has signed off on the patient, recommended further workup for weakness, dizziness.     UA added, will place in observation under hospital medicine for MRI, neuro checks, continued evaluation.    7:00 PM  Place in arms under Dr. Briseno      Disposition: obs    Patient comfortable with plan. Patient counseled regarding exam, results, diagnosis, treatment, and plan.    Impression:    Transient neurologic event, resolved  Weakness  Dizziness

## 2023-05-03 NOTE — SUBJECTIVE & OBJECTIVE
Past Medical History:   Diagnosis Date    *Atrial fibrillation     Allergy     Atrial fibrillation     Controlled gout     Diabetes mellitus type II     Hypercholesteremia     Hyperlipidemia     Hypertension     Irritable bowel syndrome (IBS)     Osteoporosis, senile      Past Surgical History:   Procedure Laterality Date    CARDIAC DEFIBRILLATOR PLACEMENT      OOPHORECTOMY      TUBAL LIGATION       Family History   Problem Relation Age of Onset    Breast cancer Sister     Colon cancer Sister     Ovarian cancer Neg Hx      Social History     Tobacco Use    Smoking status: Former     Types: Cigarettes    Smokeless tobacco: Never   Substance Use Topics    Alcohol use: No    Drug use: No     Review of patient's allergies indicates:   Allergen Reactions    Beta-adrenergic agents     Ciprofloxacin Other (See Comments)    Clindamycin      rash    Metformin     Penicillins      Eye swelling     Verapamil     Zyloprim [allopurinol]        Medications: I have reviewed the current medication administration record.    (Not in a hospital admission)      Review of Systems   Constitutional:  Negative for chills and fever.   HENT:  Negative for congestion and rhinorrhea.    Eyes:  Negative for pain and visual disturbance.   Respiratory:  Negative for chest tightness and shortness of breath.    Cardiovascular:  Negative for chest pain and palpitations.   Gastrointestinal:  Negative for abdominal distention, abdominal pain, constipation, diarrhea, nausea and vomiting.   Genitourinary:  Negative for difficulty urinating and dysuria.   Musculoskeletal:  Negative for back pain and neck pain.   Skin:  Negative for rash and wound.   Neurological:  Positive for speech difficulty, weakness and headaches. Negative for dizziness.   Psychiatric/Behavioral:  Negative for confusion and sleep disturbance.    Objective:     Vital Signs (Most Recent):  Temp: 98.3 °F (36.8 °C) (05/03/23 1258)  Pulse: 70 (05/03/23 1520)  Resp: 17 (05/03/23  1520)  BP: (!) 145/70 (05/03/23 1520)  SpO2: 98 % (05/03/23 1520)    Vital Signs Range (Last 24H):  Temp:  [98.3 °F (36.8 °C)]   Pulse:  [69-74]   Resp:  [15-18]   BP: (145-172)/(70-99)   SpO2:  [96 %-99 %]     Physical Exam  Vitals and nursing note reviewed.   Constitutional:       General: She is not in acute distress.     Appearance: Normal appearance. She is obese. She is not ill-appearing or diaphoretic.   HENT:      Head: Normocephalic and atraumatic.      Right Ear: External ear normal.      Left Ear: External ear normal.      Nose: Nose normal. No congestion or rhinorrhea.      Mouth/Throat:      Mouth: Mucous membranes are moist.      Pharynx: Oropharynx is clear.   Eyes:      General: No scleral icterus.     Extraocular Movements: Extraocular movements intact.      Pupils: Pupils are equal, round, and reactive to light.   Pulmonary:      Effort: Pulmonary effort is normal.   Abdominal:      Palpations: Abdomen is soft.   Musculoskeletal:         General: Normal range of motion.      Cervical back: Normal range of motion and neck supple.      Right lower leg: No edema.      Left lower leg: No edema.   Skin:     General: Skin is warm and dry.   Neurological:      Mental Status: She is alert and oriented to person, place, and time.      Cranial Nerves: Cranial nerves 2-12 are intact.      Sensory: Sensation is intact.      Motor: Motor function is intact.      Coordination: Coordination is intact.      Deep Tendon Reflexes: Reflexes normal.   Psychiatric:         Attention and Perception: Attention normal.         Mood and Affect: Mood is anxious.         Speech: Speech normal.         Behavior: Behavior normal. Behavior is cooperative.         Thought Content: Thought content normal.         Cognition and Memory: Cognition and memory normal.         Judgment: Judgment normal.      Comments: Initially, some stuttering but fluency resolved throughout exam       Neurological Exam:   LOC: alert  Attention Span:  Good   Language: No aphasia  Articulation: No dysarthria  Orientation: Person, Place, Time   Visual Fields: Full  EOM (CN III, IV, VI): Full/intact  Pupils (CN II, III): PERRL  Facial Sensation (CN V): Normal  Facial Movement (CN VII): Symmetric facial expression    Reflexes: 2+ throughout  Motor: Arm left  Normal 5/5  Leg left  Normal 5/5  Arm right  Normal 5/5  Leg right Normal 5/5  Cerebellum: No evidence of appendicular or axial ataxia  Sensation: Intact to light touch, temperature and vibration  Tone: Normal tone throughout      Laboratory:  CMP:   Recent Labs   Lab 05/03/23  1314   CALCIUM 9.6   ALBUMIN 3.7   PROT 6.5      K 3.2*   CO2 30*      BUN 22   CREATININE 1.4   ALKPHOS 78   ALT 24   AST 24   BILITOT 1.1*     CBC:   Recent Labs   Lab 05/03/23  1314   WBC 5.60   RBC 4.19   HGB 12.7   HCT 39.1      MCV 93   MCH 30.3   MCHC 32.5       Diagnostic Results:    Brain/Vessel Imaging:  CTA 5/3/23:   CT head shows no acute intracranial hemorrhage or CT evidence of acute major vascular territory infarct.     CTA shows no high-grade stenosis or large vessel occlusion.     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    Cardiac Evaluation:   TTE 3/29/23  Mild left atrial enlargement.  The left ventricle is normal in size with low normal systolic function.  The estimated ejection fraction is 50-55%.  Mild right atrial enlargement.  Normal right ventricular size with normal right ventricular systolic function.  Mild aortic regurgitation.  There is aortic valve sclerosis without significant stenosis.  Mild mitral regurgitation.  Mild to moderate tricuspid regurgitation.  Normal central venous pressure (3 mmHg).  The estimated PA systolic pressure is 39 mmHg.  The ascending aorta is mildly dilated measuring 4.0 cm.

## 2023-05-03 NOTE — HPI
Nyasia Frye is a 83 y.o. female with HFpEF, AF on eliquis, asthma, DM, HLD, HTN, and IBS presents with dysarthria that occurred approximately 11:30am.  She says that she was reading papers and could not see clearly with heaviness to her tongue. When she tried to talk her sound was off.  She called her son and he could not understand her on the phone due to mumbled speech.  She reports generalized weakness, no focal neurologic symptoms.  No numbness or paresthesias.  No headache, nausea or vomiting.  She says that she feels anxious now in his worried if she is having a stroke.  There is a mention by nursing notes for tongue swelling and drooling - pt denies new drugs, medications, exposures, fevers, chills, night sweats, and rash.

## 2023-05-03 NOTE — CONSULTS
[facility] - Emergency Dept  Vascular Neurology  [facility]  Consult Note    Inpatient consult to Vascular (Stroke) Neurology  Consult performed by: Charles Loomis MD  Consult ordered by: Trino King MD        Assessment/Plan:     Patient is a 83 y.o. year old female with:    * Episode of transient neurologic symptoms  Nyasia Frye is a 83 y.o. female with sudden disturbance in speech and vision around 11:30am. NIHSS 0 on exam. CTA head/neck without LVO. CTH without intracranial hemorrhage. /81 on admission. Patient's symptoms were resolved during evaluation.     --Recommend Medical workup for alternative etiology e.g., vaso-vagal syncope or electrolyte abnormalities  --MRI brain to evaluate for possible infarction     Vascular Neurology will sign off, please reach out for any further assistance.      Hypokalemia  3.2 on admission  Can cause weakness  Management per ED    Morbid obesity  Risk factor for stroke    Atrial fibrillation  Risk factor for stroke  On Eliquis 2.5mg BID    Hypertension  Risk factor for stroke    Diabetes mellitus, type 2  Risk factor for stroke        STROKE DOCUMENTATION     Acute Stroke Times   Last Known Normal Date: 05/03/23  Last Known Normal Time: 1130  Symptom Onset Date: 05/03/23  Symptom Onset Time: 1130  Stroke Team Called Date: 05/03/23  Stroke Team Called Time: 1300  Stroke Team Arrival Date: 05/03/23  Stroke Team Arrival Time: 1308  CT Interpretation Time: 1335  Thrombolytic Therapy Recommended: No  CTA Interpretation Time: 1335  Thrombectomy Recommended: No    NIH Scale:  Interval: baseline  1a. Level of Consciousness: 0-->Alert, keenly responsive  1b. LOC Questions: 0-->Answers both questions correctly  1c. LOC Commands: 0-->Performs both tasks correctly  2. Best Gaze: 0-->Normal  3. Visual: 0-->No visual loss  4. Facial Palsy: 0-->Normal symmetrical movements  5a. Motor Arm, Left: 0-->No drift, limb holds 90 (or 45) degrees for full 10 secs  5b.  Motor Arm, Right: 0-->No drift, limb holds 90 (or 45) degrees for full 10 secs  6a. Motor Leg, Left: 0-->No drift, leg holds 30 degree position for full 5 secs  6b. Motor Leg, Right: 0-->No drift, leg holds 30 degree position for full 5 secs  7. Limb Ataxia: 0-->Absent  8. Sensory: 0-->Normal, no sensory loss  9. Best Language: 0-->No aphasia, normal  10. Dysarthria: 0-->Normal  11. Extinction and Inattention (formerly Neglect): 0-->No abnormality  Total (NIH Stroke Scale): 0    Modified [place] Score: 1  [place] Coma Scale:    ABCD2 Score:    [id number] Score:   HAS -BLED Score:   ICH Score:   Hunt & Domínguez Classification:       Thrombolysis Candidate? No, Patient back to neurological baseline     Delays to Thrombolysis?  Not Applicable    Interventional Revascularization Candidate?   Is the patient eligible for mechanical endovascular reperfusion (NATHAN)?  No; no significant neurologic deficit (NIHSS <6)     Delays to Thrombectomy? Not Applicable    Hemorrhagic change of an Ischemic Stroke: Does this patient have an ischemic stroke with hemorrhagic changes? No     Subjective:     History of Present Illness:  Nyasia Frye is a 83 y.o. female with HFpEF, AF on eliquis, asthma, DM, HLD, HTN, and IBS presents with dysarthria that occurred approximately 11:30am.  She says that she was reading papers and could not see clearly with heaviness to her tongue. When she tried to talk her sound was off.  She called her son and he could not understand her on the phone due to mumbled speech.  She reports generalized weakness, no focal neurologic symptoms.  No numbness or paresthesias.  No headache, nausea or vomiting.  She says that she feels anxious now in his worried if she is having a stroke.  There is a mention by nursing notes for tongue swelling and drooling - pt denies new drugs, medications, exposures, fevers, chills, night sweats, and rash.           Past Medical History:   Diagnosis Date    *Atrial fibrillation      Allergy     Atrial fibrillation     Controlled gout     Diabetes mellitus type II     Hypercholesteremia     Hyperlipidemia     Hypertension     Irritable bowel syndrome (IBS)     Osteoporosis, senile      Past Surgical History:   Procedure Laterality Date    CARDIAC DEFIBRILLATOR PLACEMENT      OOPHORECTOMY      TUBAL LIGATION       Family History   Problem Relation Age of Onset    Breast cancer Sister     Colon cancer Sister     Ovarian cancer Neg Hx      Social History     Tobacco Use    Smoking status: Former     Types: Cigarettes    Smokeless tobacco: Never   Substance Use Topics    Alcohol use: No    Drug use: No     Review of patient's allergies indicates:   Allergen Reactions    Beta-adrenergic agents     Ciprofloxacin Other (See Comments)    Clindamycin      rash    Metformin     Penicillins      Eye swelling     Verapamil     Zyloprim [allopurinol]        Medications: I have reviewed the current medication administration record.    (Not in a hospital admission)      Review of Systems   Constitutional:  Negative for chills and fever.   HENT:  Negative for congestion and rhinorrhea.    Eyes:  Negative for pain and visual disturbance.   Respiratory:  Negative for chest tightness and shortness of breath.    Cardiovascular:  Negative for chest pain and palpitations.   Gastrointestinal:  Negative for abdominal distention, abdominal pain, constipation, diarrhea, nausea and vomiting.   Genitourinary:  Negative for difficulty urinating and dysuria.   Musculoskeletal:  Negative for back pain and neck pain.   Skin:  Negative for rash and wound.   Neurological:  Positive for speech difficulty, weakness and headaches. Negative for dizziness.   Psychiatric/Behavioral:  Negative for confusion and sleep disturbance.    Objective:     Vital Signs (Most Recent):  Temp: 98.3 °F (36.8 °C) (05/03/23 1258)  Pulse: 70 (05/03/23 1520)  Resp: 17 (05/03/23 1520)  BP: (!) 145/70 (05/03/23 1520)  SpO2: 98 %  (05/03/23 1520)    Vital Signs Range (Last 24H):  Temp:  [98.3 °F (36.8 °C)]   Pulse:  [69-74]   Resp:  [15-18]   BP: (145-172)/(70-99)   SpO2:  [96 %-99 %]     Physical Exam  Vitals and nursing note reviewed.   Constitutional:       General: She is not in acute distress.     Appearance: Normal appearance. She is obese. She is not ill-appearing or diaphoretic.   HENT:      Head: Normocephalic and atraumatic.      Right Ear: External ear normal.      Left Ear: External ear normal.      Nose: Nose normal. No congestion or rhinorrhea.      Mouth/Throat:      Mouth: Mucous membranes are moist.      Pharynx: Oropharynx is clear.   Eyes:      General: No scleral icterus.     Extraocular Movements: Extraocular movements intact.      Pupils: Pupils are equal, round, and reactive to light.   Pulmonary:      Effort: Pulmonary effort is normal.   Abdominal:      Palpations: Abdomen is soft.   Musculoskeletal:         General: Normal range of motion.      Cervical back: Normal range of motion and neck supple.      Right lower leg: No edema.      Left lower leg: No edema.   Skin:     General: Skin is warm and dry.   Neurological:      Mental Status: She is alert and oriented to person, place, and time.      Cranial Nerves: Cranial nerves 2-12 are intact.      Sensory: Sensation is intact.      Motor: Motor function is intact.      Coordination: Coordination is intact.      Deep Tendon Reflexes: Reflexes normal.   Psychiatric:         Attention and Perception: Attention normal.         Mood and Affect: Mood is anxious.         Speech: Speech normal.         Behavior: Behavior normal. Behavior is cooperative.         Thought Content: Thought content normal.         Cognition and Memory: Cognition and memory normal.         Judgment: Judgment normal.      Comments: Initially, some stuttering but fluency resolved throughout exam       Neurological Exam:   LOC: alert  Attention Span: Good   Language: No aphasia  Articulation: No  dysarthria  Orientation: Person, Place, Time   Visual Fields: Full  EOM (CN III, IV, VI): Full/intact  Pupils (CN II, III): PERRL  Facial Sensation (CN V): Normal  Facial Movement (CN VII): Symmetric facial expression    Reflexes: 2+ throughout  Motor: Arm left  Normal 5/5  Leg left  Normal 5/5  Arm right  Normal 5/5  Leg right Normal 5/5  Cerebellum: No evidence of appendicular or axial ataxia  Sensation: Intact to light touch, temperature and vibration  Tone: Normal tone throughout      Laboratory:  CMP:   Recent Labs   Lab 05/03/23  1314   CALCIUM 9.6   ALBUMIN 3.7   PROT 6.5      K 3.2*   CO2 30*      BUN 22   CREATININE 1.4   ALKPHOS 78   ALT 24   AST 24   BILITOT 1.1*     CBC:   Recent Labs   Lab 05/03/23  1314   WBC 5.60   RBC 4.19   HGB 12.7   HCT 39.1      MCV 93   MCH 30.3   MCHC 32.5       Diagnostic Results:    Brain/Vessel Imaging:  CTA 5/3/23:   CT head shows no acute intracranial hemorrhage or CT evidence of acute major vascular territory infarct.     CTA shows no high-grade stenosis or large vessel occlusion.     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    Cardiac Evaluation:   TTE 3/29/23   Mild left atrial enlargement.   The left ventricle is normal in size with low normal systolic function.   The estimated ejection fraction is 50-55%.   Mild right atrial enlargement.   Normal right ventricular size with normal right ventricular systolic function.   Mild aortic regurgitation.   There is aortic valve sclerosis without significant stenosis.   Mild mitral regurgitation.   Mild to moderate tricuspid regurgitation.   Normal central venous pressure (3 mmHg).   The estimated PA systolic pressure is 39 mmHg.   The ascending aorta is mildly dilated measuring 4.0 cm.        Charles Loomis MD  [facility]  Department of Vascular Neurology   [facility] - Emergency Dept

## 2023-05-03 NOTE — ASSESSMENT & PLAN NOTE
Nyasia Frye is a 83 y.o. female with sudden disturbance in speech and vision around 11:30am. NIHSS 0 on exam. CTA head/neck without LVO. CTH without intracranial hemorrhage. /81 on admission. Patient's symptoms were resolved during evaluation.     --Recommend Medical workup for alternative etiology e.g., vaso-vagal syncope or electrolyte abnormalities  --MRI brain to evaluate for possible infarction     Vascular Neurology will sign off, please reach out for any further assistance.

## 2023-05-04 ENCOUNTER — DOCUMENTATION ONLY (OUTPATIENT)
Dept: CARDIOLOGY | Facility: HOSPITAL | Age: 83
End: 2023-05-04
Payer: MEDICARE

## 2023-05-04 ENCOUNTER — DOCUMENTATION ONLY (OUTPATIENT)
Dept: ELECTROPHYSIOLOGY | Facility: CLINIC | Age: 83
End: 2023-05-04
Payer: MEDICARE

## 2023-05-04 PROBLEM — I67.1 ANEURYSM OF MIDDLE CEREBRAL ARTERY: Status: ACTIVE | Noted: 2023-05-04

## 2023-05-04 LAB
ANION GAP SERPL CALC-SCNC: 10 MMOL/L (ref 8–16)
BACTERIA UR CULT: NORMAL
BUN SERPL-MCNC: 22 MG/DL (ref 8–23)
CALCIUM SERPL-MCNC: 10 MG/DL (ref 8.7–10.5)
CHLORIDE SERPL-SCNC: 103 MMOL/L (ref 95–110)
CO2 SERPL-SCNC: 27 MMOL/L (ref 23–29)
CREAT SERPL-MCNC: 1.2 MG/DL (ref 0.5–1.4)
EST. GFR  (NO RACE VARIABLE): 44.9 ML/MIN/1.73 M^2
GLUCOSE SERPL-MCNC: 99 MG/DL (ref 70–110)
MAGNESIUM SERPL-MCNC: 2.2 MG/DL (ref 1.6–2.6)
PHOSPHATE SERPL-MCNC: 3 MG/DL (ref 2.7–4.5)
POCT GLUCOSE: 111 MG/DL (ref 70–110)
POCT GLUCOSE: 129 MG/DL (ref 70–110)
POCT GLUCOSE: 137 MG/DL (ref 70–110)
POCT GLUCOSE: 160 MG/DL (ref 70–110)
POTASSIUM SERPL-SCNC: 3.6 MMOL/L (ref 3.5–5.1)
SODIUM SERPL-SCNC: 140 MMOL/L (ref 136–145)

## 2023-05-04 PROCEDURE — 99233 SBSQ HOSP IP/OBS HIGH 50: CPT | Mod: ,,,

## 2023-05-04 PROCEDURE — 36415 COLL VENOUS BLD VENIPUNCTURE: CPT | Performed by: HOSPITALIST

## 2023-05-04 PROCEDURE — G0378 HOSPITAL OBSERVATION PER HR: HCPCS

## 2023-05-04 PROCEDURE — 83735 ASSAY OF MAGNESIUM: CPT | Performed by: HOSPITALIST

## 2023-05-04 PROCEDURE — 80048 BASIC METABOLIC PNL TOTAL CA: CPT | Performed by: HOSPITALIST

## 2023-05-04 PROCEDURE — 99233 PR SUBSEQUENT HOSPITAL CARE,LEVL III: ICD-10-PCS | Mod: ,,,

## 2023-05-04 PROCEDURE — 84100 ASSAY OF PHOSPHORUS: CPT | Performed by: HOSPITALIST

## 2023-05-04 PROCEDURE — 25000242 PHARM REV CODE 250 ALT 637 W/ HCPCS: Performed by: HOSPITALIST

## 2023-05-04 PROCEDURE — 25000003 PHARM REV CODE 250: Performed by: HOSPITALIST

## 2023-05-04 RX ADMIN — DICYCLOMINE HYDROCHLORIDE 10 MG: 10 CAPSULE ORAL at 11:05

## 2023-05-04 RX ADMIN — DICYCLOMINE HYDROCHLORIDE 10 MG: 10 CAPSULE ORAL at 04:05

## 2023-05-04 RX ADMIN — THERA TABS 1 TABLET: TAB at 08:05

## 2023-05-04 RX ADMIN — FLUTICASONE PROPIONATE 50 MCG: 50 SPRAY, METERED NASAL at 08:05

## 2023-05-04 RX ADMIN — DICYCLOMINE HYDROCHLORIDE 10 MG: 10 CAPSULE ORAL at 08:05

## 2023-05-04 RX ADMIN — FUROSEMIDE 40 MG: 40 TABLET ORAL at 08:05

## 2023-05-04 RX ADMIN — ACETAMINOPHEN 650 MG: 325 TABLET ORAL at 08:05

## 2023-05-04 RX ADMIN — BUSPIRONE HYDROCHLORIDE 5 MG: 5 TABLET ORAL at 08:05

## 2023-05-04 RX ADMIN — CARVEDILOL 3.12 MG: 3.12 TABLET, FILM COATED ORAL at 08:05

## 2023-05-04 RX ADMIN — HYPROMELLOSE 2910 2 DROP: 5 SOLUTION/ DROPS OPHTHALMIC at 06:05

## 2023-05-04 RX ADMIN — HYPROMELLOSE 2910 2 DROP: 5 SOLUTION/ DROPS OPHTHALMIC at 04:05

## 2023-05-04 RX ADMIN — HYPROMELLOSE 2910 2 DROP: 5 SOLUTION/ DROPS OPHTHALMIC at 08:05

## 2023-05-04 RX ADMIN — Medication 2 CAPSULE: at 08:05

## 2023-05-04 RX ADMIN — CARVEDILOL 3.12 MG: 3.12 TABLET, FILM COATED ORAL at 04:05

## 2023-05-04 RX ADMIN — ATORVASTATIN CALCIUM 40 MG: 40 TABLET, FILM COATED ORAL at 08:05

## 2023-05-04 RX ADMIN — APIXABAN 2.5 MG: 2.5 TABLET, FILM COATED ORAL at 08:05

## 2023-05-04 RX ADMIN — DICYCLOMINE HYDROCHLORIDE 10 MG: 10 CAPSULE ORAL at 06:05

## 2023-05-04 RX ADMIN — LOSARTAN POTASSIUM 100 MG: 50 TABLET, FILM COATED ORAL at 08:05

## 2023-05-04 RX ADMIN — Medication 1000 UNITS: at 08:05

## 2023-05-04 RX ADMIN — PANTOPRAZOLE SODIUM 20 MG: 20 TABLET, DELAYED RELEASE ORAL at 06:05

## 2023-05-04 NOTE — PROGRESS NOTES
Received a call from Teresa SINGLETON NP with Hospital Medicine in relation to this Inpatient needing to have a MRI and has a [employer] ICD.  Please see information below as it relates to patient's Device being MRI compatible/conditional.  To meet protocol the Pacemaker/ICD must have been implanted no less than 6 weeks prior to scheduled date of Scan.  ICD/PPM and leads must be from same .  MRI informed ordering MD must input a Cardiac Device Check in clinic and hospital order, patient must have an xray within 6 months or less prior to MRI reprogramming.  Chest xray to be reviewed by Radiologist.    Info as per [employer] pt records:  Compia CRT-D Surescan Model # [id number] implanted on 11/7/2017  RV Lead: [id number] - imp date 11/24/2015  LV Lead: [id number] - imp date 11/7/2017  As per [employer]'s MR-Conditional Product Listing tool, this ICD system IS MR-Conditional.    MRI to be done this am @ ~ 9:30.  Confirmation received from Mindi Post with [employer]. Corinne in MRI notifed.

## 2023-05-04 NOTE — CONSULTS
Hospital Medicine Pharmacy Consult Note    PharmD Consult Received For: review medications for cause of dysarthria and tongue swelling    Common causes of medication induced dysarthria include carbamazepine, irinotecan, lithium, and phenytoin. Patient is not on any of these medications.  Losartan can cause tongue swelling in the form of angioedema but that does not seem to align with the patient's current presentation.  Seems low likelihood this is a medication induced event.    Current Outpatient Medications on File Prior to Encounter   Medication Sig    acetaminophen (TYLENOL) 500 MG tablet Take 1 tablet (500 mg total) by mouth every 6 (six) hours as needed for Pain (and fever).    albuterol (PROVENTIL/VENTOLIN HFA) 90 mcg/actuation inhaler Inhale 2 puffs into the lungs.    atorvastatin (LIPITOR) 40 MG tablet Take 40 mg by mouth.    busPIRone (BUSPAR) 5 MG Tab Take 5 mg by mouth 2 (two) times daily.    carvediloL (COREG) 3.125 MG tablet Take 1 tablet (3.125 mg total) by mouth 2 (two) times daily with meals.    cholecalciferol, vitamin D3, (VITAMIN D3) 25 mcg (1,000 unit) capsule Take 1,000 Units by mouth once daily.    cycloSPORINE (RESTASIS) 0.05 % ophthalmic emulsion Apply 1 drop to eye every 12 (twelve) hours.    dicyclomine (BENTYL) 10 MG capsule Take 10 mg by mouth 4 (four) times daily before meals and nightly.    ELIQUIS 2.5 mg Tab Take 2.5 mg by mouth 2 (two) times daily.    ergocalciferol (ERGOCALCIFEROL) 50,000 unit Cap Take 50,000 Units by mouth every 30 days.    fish oil-omega-3 fatty acids 300-1,000 mg capsule Take 2 g by mouth once daily.      fluticasone propionate (FLONASE) 50 mcg/actuation nasal spray 1 spray (50 mcg total) by Each Nostril route 2 (two) times daily.    furosemide (LASIX) 40 MG tablet Take 40 mg by mouth 2 (two) times daily.    JARDIANCE 25 mg tablet Take 25 mg by mouth once daily.    levalbuterol (XOPENEX) 1.25 mg/3 mL nebulizer solution Take 3 mLs (1.25 mg total) by nebulization  every 6 (six) hours as needed for Wheezing. Rescue    losartan (COZAAR) 100 MG tablet Take 100 mg by mouth once daily.    multivitamin with minerals tablet Take by mouth.    mv-min-folic acid-lutein 500-250 mcg Chew Take 1 tablet by mouth.    nitroGLYCERIN (NITROSTAT) 0.4 MG SL tablet Place 1 tablet (0.4 mg total) under the tongue every 5 (five) minutes as needed for Chest pain.    pantoprazole (PROTONIX) 20 MG tablet Take 20 mg by mouth every morning.    APPLE CIDER VINEGAR ORAL Take 450 mg by mouth once daily.    denosumab (PROLIA) 60 mg/mL Syrg Inject 1 mL (60 mg total) into the skin every 6 (six) months.    econazole nitrate 1 % cream Apply topically 2 (two) times daily.    predniSONE (DELTASONE) 20 MG tablet Take 2 tablets (40 mg total) by mouth once daily. for 5 days    TRADJENTA 5 mg Tab tablet TAKE ONE(1) TABLET BY MOUTH EVERY DAY       Thank you for the consult,  Annmarie Cardozo, PharmD, BCPS  [id number]    **Note: Consults are reviewed Monday-Friday 7:00am-3:30pm. The above recommendations are only suggested. The recommendations should be considered in conjunction with all patient factors.**

## 2023-05-04 NOTE — SUBJECTIVE & OBJECTIVE
Interval History:  NAEON. AFVSS. MRI brain negative for acute infarct.  Patient evaluated at bedside by me this morning. Endorsing emotional distress due to the anniversary of several family members passing coming up. Reports remembering how her mother became aphasic after her stroke which causes her to feel very anxious and panic during her episode yesterday. Psychology consulted. States symptoms have since resolved today. Neuro exam within normal limits, no focal deficits.   Plan to monitor urine culture for any growth. No other complaints, all questions answered.     Review of Systems   Constitutional:  Negative for chills and fever.   Respiratory:  Negative for chest tightness and shortness of breath.    Cardiovascular:  Negative for chest pain and leg swelling.   Gastrointestinal:  Negative for abdominal pain and nausea.   Neurological:  Positive for speech difficulty (resolved). Negative for dizziness and weakness.   Psychiatric/Behavioral:  The patient is nervous/anxious.    Objective:     Vital Signs (Most Recent):  Temp: 98 °F (36.7 °C) (05/04/23 1616)  Pulse: 70 (05/04/23 1634)  Resp: 18 (05/04/23 1616)  BP: (!) 151/87 (05/04/23 1616)  SpO2: 98 % (05/04/23 1616)   Vital Signs (24h Range):  Temp:  [97.5 °F (36.4 °C)-98.5 °F (36.9 °C)] 98 °F (36.7 °C)  Pulse:  [69-76] 70  Resp:  [15-18] 18  SpO2:  [95 %-100 %] 98 %  BP: (139-187)/() 151/87     Weight: 94.3 kg (208 lb)  Body mass index is 35.7 kg/m².  No intake or output data in the 24 hours ending 05/04/23 1830      Physical Exam  Vitals and nursing note reviewed.   Constitutional:       General: She is not in acute distress.     Appearance: She is well-developed. She is obese.   HENT:      Head: Normocephalic and atraumatic.      Mouth/Throat:      Pharynx: No oropharyngeal exudate.      Comments: No tongue swelling   Eyes:      Conjunctiva/sclera: Conjunctivae normal.      Pupils: Pupils are equal, round, and reactive to light.   Cardiovascular:       Rate and Rhythm: Normal rate and regular rhythm.      Heart sounds: Normal heart sounds.   Pulmonary:      Effort: Pulmonary effort is normal. No respiratory distress.      Breath sounds: Normal breath sounds. No wheezing.   Abdominal:      General: Bowel sounds are normal. There is no distension.      Palpations: Abdomen is soft.      Tenderness: There is no abdominal tenderness.   Musculoskeletal:         General: No tenderness. Normal range of motion.      Cervical back: Normal range of motion and neck supple.   Lymphadenopathy:      Cervical: No cervical adenopathy.   Skin:     General: Skin is warm and dry.      Capillary Refill: Capillary refill takes less than 2 seconds.      Findings: No rash.   Neurological:      Mental Status: She is alert and oriented to person, place, and time.      Cranial Nerves: No cranial nerve deficit, dysarthria or facial asymmetry.      Sensory: Sensation is intact. No sensory deficit.      Motor: Motor function is intact. No weakness.      Coordination: Coordination normal.   Psychiatric:         Behavior: Behavior normal.         Thought Content: Thought content normal.         Judgment: Judgment normal.           Significant Labs: All pertinent labs within the past 24 hours have been reviewed.    Significant Imaging: I have reviewed all pertinent imaging results/findings within the past 24 hours.

## 2023-05-04 NOTE — HOSPITAL COURSE
Nyasia Frye was admitted to hospital medicine for management of transient neurological symptoms. Stroke code activated, CTA and MRI brain negative for acute infarction. CTA did find small (3 mm) wide neck outpouching at the left MCA bifurcation likely an incidental aneurysm, referral to neurology for outpatient monitoring made prior to discharge. Patient with significant emotional stress as this time of year is the anniversary of several family members passing. Neurological symptoms completely resolved and did not return during hospital course. Psychology consulted, referral to outpatient grief counseling placed. UA borderline infectious, urine culture without significant growth. Referral to neurosurgery made prior to discharge for outpatient monitoring of aneurysm.     On day of discharge patient's vital signs were stable and patient appeared clinically ready for discharge. Hospital course, discharge plan and return precautions discussed - patient expressed understanding. All questions answered at that time.

## 2023-05-04 NOTE — NURSING
MRI complete at this time, patient tolerated MRI well, heart rate 69, O2 sat. 97% on RA, patient assisted back to stretcher without difficulty, telemetry monitor reapplied, cardiac pacemaker/defibrillator placed in normal operating mode per [employer] Rep., patient awaiting transport back to her room upstairs.

## 2023-05-04 NOTE — PROGRESS NOTES
An MRI has been ordered on this patient with a [employer] implantable cardiac device.    The patient's medical record has been reviewed for the following:    The SureScan system is implanted in the left or right pectoral region    The SureScan device is operating within the projected service life    The MRI conditional system has been implanted for a minimum of 6 weeks and is considered post the lead maturation period    The patient has no implanted lead extenders, lead adaptors, or abandoned leads    There are no known broken leads or leads with intermittent electrical contact as confirmed by the lead impedance history    Pacing capture thresholds are < 2.0 volts (V) at a pulse width of 0.40 milliseconds (ms)    The measured pacing lead impedance value of >/= 200 Ohms and </= 3,000 Ohms; ICD shock lead impedances between 20 and 200 Ohms    There is no noted diaphragmatic stimulation at a pacing output of 5.0V and at a pulse width of 1.0 ms in patients whose device will be programmed to an asynchronous pacing mode    Parameters should be reprogrammed with MRI SureScan mode programmed to ON as per the manufacturers scanning guidelines.    EKG review shows BiV pacing    Pacing parameters should be programmed as such:     Recommend VOO: + 10 beats above BR

## 2023-05-04 NOTE — PROGRESS NOTES
[facility] [address] [place] [address] Medicine  Progress Note    Patient Name: Nyasia Frye  MRN: [id number]  Patient Class: OP- Observation   Admission Date: 5/3/2023  Length of Stay: 0 days  Attending Physician: Homero Valera MD  Primary Care Provider: Marianne Padilla MD        Subjective:     Principal Problem:Episode of transient neurologic symptoms        HPI:  83-year-old woman with a history of nonischemic cardiomyopathy, status post ICD, type 2 diabetes, atrial fibrillation, hypertension presents to the emergency department with complaints of slurred speech and dysarthria.  Earlier today she had the onset of dysarthria slurred speech and weakness without the presence of any unilateral neurologic deficits no paresthesias no numbness.  Family brought patient to the hospital for concern she might be having a stroke she has no prior history of a stroke.  She took all home medicines as directed earlier today.    Initial workup was concerning for stroke alert had CTA stroke protocol and was evaluated by vascular Neurology, CT without signs of acute hemorrhage infarct or insult occlusion.  It was also noted that her symptoms were resolving in the emergency department complete MRI.  Due to her ICD it requires device rep to place into a compatible format for MRI brain to be completed.    Lab workup shows mild hypokalemia patient is on b.i.d. furosemide reports she was taken off of potassium supplementation recently.       Overview/Hospital Course:  Nyasia Frye was admitted to hospital medicine for management of transient neurological symptoms. Stroke code activated, CTA and MRI brain negative for acute infarction. CTA did find small (3 mm) wide neck outpouching at the left MCA bifurcation likely an incidental aneurysm, referral to neurology for outpatient monitoring made prior to discharge. Patient with significant emotional stress as this time of year is the anniversary of several family members passing. Psychology  consulted, referral to outpatient grief counseling placed. UA borderline infectious, urine culture pending.       Interval History:  NAEON. AFVSS. MRI brain negative for acute infarct.  Patient evaluated at bedside by me this morning. Endorsing emotional distress due to the anniversary of several family members passing coming up. Reports remembering how her mother became aphasic after her stroke which causes her to feel very anxious and panic during her episode yesterday. Psychology consulted. States symptoms have since resolved today. Neuro exam within normal limits, no focal deficits.   Plan to monitor urine culture for any growth. No other complaints, all questions answered.     Review of Systems   Constitutional:  Negative for chills and fever.   Respiratory:  Negative for chest tightness and shortness of breath.    Cardiovascular:  Negative for chest pain and leg swelling.   Gastrointestinal:  Negative for abdominal pain and nausea.   Neurological:  Positive for speech difficulty (resolved). Negative for dizziness and weakness.   Psychiatric/Behavioral:  The patient is nervous/anxious.    Objective:     Vital Signs (Most Recent):  Temp: 98 °F (36.7 °C) (05/04/23 1616)  Pulse: 70 (05/04/23 1634)  Resp: 18 (05/04/23 1616)  BP: (!) 151/87 (05/04/23 1616)  SpO2: 98 % (05/04/23 1616)   Vital Signs (24h Range):  Temp:  [97.5 °F (36.4 °C)-98.5 °F (36.9 °C)] 98 °F (36.7 °C)  Pulse:  [69-76] 70  Resp:  [15-18] 18  SpO2:  [95 %-100 %] 98 %  BP: (139-187)/() 151/87     Weight: 94.3 kg (208 lb)  Body mass index is 35.7 kg/m².  No intake or output data in the 24 hours ending 05/04/23 1830      Physical Exam  Vitals and nursing note reviewed.   Constitutional:       General: She is not in acute distress.     Appearance: She is well-developed. She is obese.   HENT:      Head: Normocephalic and atraumatic.      Mouth/Throat:      Pharynx: No oropharyngeal exudate.      Comments: No tongue swelling   Eyes:       Conjunctiva/sclera: Conjunctivae normal.      Pupils: Pupils are equal, round, and reactive to light.   Cardiovascular:      Rate and Rhythm: Normal rate and regular rhythm.      Heart sounds: Normal heart sounds.   Pulmonary:      Effort: Pulmonary effort is normal. No respiratory distress.      Breath sounds: Normal breath sounds. No wheezing.   Abdominal:      General: Bowel sounds are normal. There is no distension.      Palpations: Abdomen is soft.      Tenderness: There is no abdominal tenderness.   Musculoskeletal:         General: No tenderness. Normal range of motion.      Cervical back: Normal range of motion and neck supple.   Lymphadenopathy:      Cervical: No cervical adenopathy.   Skin:     General: Skin is warm and dry.      Capillary Refill: Capillary refill takes less than 2 seconds.      Findings: No rash.   Neurological:      Mental Status: She is alert and oriented to person, place, and time.      Cranial Nerves: No cranial nerve deficit, dysarthria or facial asymmetry.      Sensory: Sensation is intact. No sensory deficit.      Motor: Motor function is intact. No weakness.      Coordination: Coordination normal.   Psychiatric:         Behavior: Behavior normal.         Thought Content: Thought content normal.         Judgment: Judgment normal.           Significant Labs: All pertinent labs within the past 24 hours have been reviewed.    Significant Imaging: I have reviewed all pertinent imaging results/findings within the past 24 hours.      Assessment/Plan:      * Episode of transient neurologic symptoms  Transient dysarthria, no slurred speech now  -mild hypokalemia on labs, now resolved   - no other acute findings on imaging to explain symptoms  - PharmD consult to assist review if any medications could be culprit   Seems low likelihood this is a medication induced event.  -     Aneurysm of middle cerebral artery  CTA head showing small (3 mm) wide neck outpouching at the left MCA bifurcation  likely an incidental aneurysm.  The major anterior and middle cerebral artery branches are patent.   - referral to neurology outpatient for follow up placed     Hypokalemia  Resolved     IBS (irritable bowel syndrome)  - chronic, noted       Morbid obesity  Body mass index is 35.7 kg/m². Morbid obesity complicates all aspects of disease management from diagnostic modalities to treatment. Weight loss encouraged and health benefits explained to patient.    Stage 3b chronic kidney disease  - creatinine appears to be at baseline   - avoid nephrotoxic agents as able   - monitor kidney function on cmp     Hyperlipidemia  - chronic  - continue home statin     Chronic atrial fibrillation  Patient with Persistent (7 days or more) atrial fibrillation which is controlled currently with Beta Blocker. Patient is currently in sinus rhythm.[id number] Score: 5. HASBLED Score: . Anticoagulation indicated. Anticoagulation done with eliquis.    Primary hypertension  Resume home medications - carvedilol and losartan     Type 2 diabetes mellitus with hyperglycemia, without long-term current use of insulin  Patient's FSGs are controlled on current medication regimen.  Last A1c reviewed-   Lab Results   Component Value Date    HGBA1C 6.2 (H) 03/29/2023     Most recent fingerstick glucose reviewed-   Recent Labs   Lab 05/04/23  0820 05/04/23  1127 05/04/23  1625   POCTGLUCOSE 111* 160* 137*     Current correctional scale  Low  Maintain anti-hyperglycemic dose as follows-   Antihyperglycemics (From admission, onward)    Start     Stop Route Frequency Ordered    05/04/23 0032  insulin aspart U-100 pen 0-5 Units         -- SubQ Before meals & nightly PRN 05/03/23 2332        Hold Oral hypoglycemics while patient is in the hospital.      VTE Risk Mitigation (From admission, onward)         Ordered     apixaban tablet 2.5 mg  2 times daily         05/03/23 2318     Reason for No Pharmacological VTE Prophylaxis  Once        Question:  Reasons:   Answer:  Already adequately anticoagulated on oral Anticoagulants    05/03/23 2318     IP VTE HIGH RISK PATIENT  Once         05/03/23 2318     Place sequential compression device  Until discontinued         05/03/23 2318                Discharge Planning   LATRELL: 5/5/2023     Code Status: Full Code   Is the patient medically ready for discharge?: No    Reason for patient still in hospital (select all that apply): Patient trending condition, Laboratory test, Treatment and Consult recommendations                     Brittney Segura PA-C  Department of Hospital Medicine   [facility]

## 2023-05-04 NOTE — ASSESSMENT & PLAN NOTE
Patient with Persistent (7 days or more) atrial fibrillation which is controlled currently with Beta Blocker. Patient is currently in sinus rhythm.[id number] Score: 5. HASBLED Score: . Anticoagulation indicated. Anticoagulation done with eliquis.

## 2023-05-04 NOTE — HPI
83-year-old woman with a history of nonischemic cardiomyopathy, status post ICD, type 2 diabetes, atrial fibrillation, hypertension presents to the emergency department with complaints of slurred speech and dysarthria.  Earlier today she had the onset of dysarthria slurred speech and weakness without the presence of any unilateral neurologic deficits no paresthesias no numbness.  Family brought patient to the hospital for concern she might be having a stroke she has no prior history of a stroke.  She took all home medicines as directed earlier today.    Initial workup was concerning for stroke alert had CTA stroke protocol and was evaluated by vascular Neurology, CT without signs of acute hemorrhage infarct or insult occlusion.  It was also noted that her symptoms were resolving in the emergency department complete MRI.  Due to her ICD it requires device rep to place into a compatible format for MRI brain to be completed.    Lab workup shows mild hypokalemia patient is on b.i.d. furosemide reports she was taken off of potassium supplementation recently.

## 2023-05-04 NOTE — ASSESSMENT & PLAN NOTE
Transient dysarthria, no slurred speech now  -patient still comments on tongue heaviness  -weakness improved  -mild hypokalemia on labs - no other acute findings on imaging to explain symptom  -she is on multiple medications, possible side effect - PharmD consult to assist review if any medications could be culprit, symptoms started after AM meds taken.   -MRI brain recommended by vascular neurology - ED has obtaind ICD device information (see media tab) and sent to CT scanner.  A Cardiac Device Check order placed, would contact EP/Device clinic in AM to assist in having her ICD placed into a compatible mode for MRI to definitively rule out stroke.

## 2023-05-04 NOTE — ASSESSMENT & PLAN NOTE
Patient's FSGs are controlled on current medication regimen.  Last A1c reviewed-   Lab Results   Component Value Date    HGBA1C 6.2 (H) 03/29/2023     Most recent fingerstick glucose reviewed-   Recent Labs   Lab 05/04/23  0820 05/04/23  1127 05/04/23  1625   POCTGLUCOSE 111* 160* 137*     Current correctional scale  Low  Maintain anti-hyperglycemic dose as follows-   Antihyperglycemics (From admission, onward)    Start     Stop Route Frequency Ordered    05/04/23 0032  insulin aspart U-100 pen 0-5 Units         -- SubQ Before meals & nightly PRN 05/03/23 2332        Hold Oral hypoglycemics while patient is in the hospital.

## 2023-05-04 NOTE — PROGRESS NOTES
An MRI has been ordered on this patient with a [employer] implantable cardiac device.    The patient's medical record has been reviewed for the following:    The SureScan system is implanted in the left or right pectoral region    The SureScan device is operating within the projected service life    The MRI conditional system has been implanted for a minimum of 6 weeks and is considered post the lead maturation period    The patient has no implanted lead extenders, lead adaptors, or abandoned leads    There are no known broken leads or leads with intermittent electrical contact as confirmed by the lead impedance history    Pacing capture thresholds are < 2.0 volts (V) at a pulse width of 0.40 milliseconds (ms)    For pacemakers, the measured pacing lead impedances are >/= 200 Ohms and </= 1500 Ohms    For ICD's, the measured pacing lead impedance value of >/= 200 Ohms and </= 3,000 Ohms; ICD shock lead impedances between 20 and 200 Ohms    There is no noted diaphragmatic stimulation at a pacing output of 5.0V and at a pulse width of 1.0 ms in patients whose device will be programmed to an asynchronous pacing mode    Parameters should be reprogrammed with MRI SureScan mode programmed to ON as per the manufacturers scanning guidelines.    Pacing parameters should be programmed as such:     VOO at 70 bpm

## 2023-05-04 NOTE — PLAN OF CARE
Problem: Adult Inpatient Plan of Care  Goal: Plan of Care Review  5/4/2023 0614 by Kirsten Aleman RN  Outcome: Ongoing, Progressing  5/4/2023 0613 by Kirsten Aleman RN  Outcome: Ongoing, Progressing  Goal: Patient-Specific Goal (Individualized)  5/4/2023 0614 by Kirsten Aleman RN  Outcome: Ongoing, Progressing  5/4/2023 0613 by Kirsten Aleman RN  Outcome: Ongoing, Progressing  Goal: Absence of Hospital-Acquired Illness or Injury  5/4/2023 0614 by Kirsten Aleman RN  Outcome: Ongoing, Progressing  5/4/2023 0613 by Kirsten Aleman RN  Outcome: Ongoing, Progressing  Goal: Optimal Comfort and Wellbeing  5/4/2023 0614 by Kirsten Aleman RN  Outcome: Ongoing, Progressing  5/4/2023 0613 by Kirsten Aleman RN  Outcome: Ongoing, Progressing  Goal: Readiness for Transition of Care  5/4/2023 0614 by Kirsten Aleman RN  Outcome: Ongoing, Progressing  5/4/2023 0613 by Kirsten Aleman RN  Outcome: Ongoing, Progressing     Problem: Diabetes Comorbidity  Goal: Blood Glucose Level Within Targeted Range  5/4/2023 0614 by Kirsten Aleman RN  Outcome: Ongoing, Progressing  5/4/2023 0613 by Kirsten Aleman RN  Outcome: Ongoing, Progressing     Problem: Fluid and Electrolyte Imbalance (Acute Kidney Injury/Impairment)  Goal: Fluid and Electrolyte Balance  5/4/2023 0614 by Kirsten Aleman RN  Outcome: Ongoing, Progressing  5/4/2023 0613 by Kirsten Aleman RN  Outcome: Ongoing, Progressing     Problem: Oral Intake Inadequate (Acute Kidney Injury/Impairment)  Goal: Optimal Nutrition Intake  5/4/2023 0614 by Kirsten Aleman RN  Outcome: Ongoing, Progressing  5/4/2023 0613 by Kirsten Aleman RN  Outcome: Ongoing, Progressing     Problem: Renal Function Impairment (Acute Kidney Injury/Impairment)  Goal: Effective Renal Function  5/4/2023 0614 by Kirsten Aleman RN  Outcome: Ongoing, Progressing  5/4/2023 0613 by Kirsten M. Love, RN  Outcome: Ongoing, Progressing     Problem: Infection  Goal: Absence of Infection Signs and Symptoms  5/4/2023 0614 by Kirsten Aleman RN  Outcome:  Ongoing, Progressing  5/4/2023 0613 by Kirsten Aleman, RN  Outcome: Ongoing, Progressing

## 2023-05-04 NOTE — SUBJECTIVE & OBJECTIVE
Past Medical History:   Diagnosis Date    *Atrial fibrillation     Allergy     Atrial fibrillation     Controlled gout     Diabetes mellitus type II     Hypercholesteremia     Hyperlipidemia     Hypertension     Irritable bowel syndrome (IBS)     Osteoporosis, senile        Past Surgical History:   Procedure Laterality Date    CARDIAC DEFIBRILLATOR PLACEMENT      OOPHORECTOMY      TUBAL LIGATION         Review of patient's allergies indicates:   Allergen Reactions    Beta-adrenergic agents     Ciprofloxacin Other (See Comments)    Clindamycin      rash    Fluticasone     Metformin     Penicillins      Eye swelling     Verapamil     Zyloprim [allopurinol]        No current facility-administered medications on file prior to encounter.     Current Outpatient Medications on File Prior to Encounter   Medication Sig    acetaminophen (TYLENOL) 500 MG tablet Take 1 tablet (500 mg total) by mouth every 6 (six) hours as needed for Pain (and fever).    albuterol (PROVENTIL/VENTOLIN HFA) 90 mcg/actuation inhaler Inhale 2 puffs into the lungs.    atorvastatin (LIPITOR) 40 MG tablet Take 40 mg by mouth.    busPIRone (BUSPAR) 5 MG Tab Take 5 mg by mouth 2 (two) times daily.    carvediloL (COREG) 3.125 MG tablet Take 1 tablet (3.125 mg total) by mouth 2 (two) times daily with meals.    cholecalciferol, vitamin D3, (VITAMIN D3) 25 mcg (1,000 unit) capsule Take 1,000 Units by mouth once daily.    cycloSPORINE (RESTASIS) 0.05 % ophthalmic emulsion Apply 1 drop to eye every 12 (twelve) hours.    dicyclomine (BENTYL) 10 MG capsule Take 10 mg by mouth 4 (four) times daily before meals and nightly.    ELIQUIS 2.5 mg Tab Take 2.5 mg by mouth 2 (two) times daily.    ergocalciferol (ERGOCALCIFEROL) 50,000 unit Cap Take 50,000 Units by mouth every 30 days.    fish oil-omega-3 fatty acids 300-1,000 mg capsule Take 2 g by mouth once daily.      fluticasone propionate (FLONASE) 50 mcg/actuation nasal spray 1 spray (50 mcg total) by Each Nostril  route 2 (two) times daily.    furosemide (LASIX) 40 MG tablet Take 40 mg by mouth 2 (two) times daily.    JARDIANCE 25 mg tablet Take 25 mg by mouth once daily.    levalbuterol (XOPENEX) 1.25 mg/3 mL nebulizer solution Take 3 mLs (1.25 mg total) by nebulization every 6 (six) hours as needed for Wheezing. Rescue    losartan (COZAAR) 100 MG tablet Take 100 mg by mouth once daily.    multivitamin with minerals tablet Take by mouth.    mv-min-folic acid-lutein 500-250 mcg Chew Take 1 tablet by mouth.    nitroGLYCERIN (NITROSTAT) 0.4 MG SL tablet Place 1 tablet (0.4 mg total) under the tongue every 5 (five) minutes as needed for Chest pain.    pantoprazole (PROTONIX) 20 MG tablet Take 20 mg by mouth every morning.    APPLE CIDER VINEGAR ORAL Take 450 mg by mouth once daily.    denosumab (PROLIA) 60 mg/mL Syrg Inject 1 mL (60 mg total) into the skin every 6 (six) months.    econazole nitrate 1 % cream Apply topically 2 (two) times daily.    predniSONE (DELTASONE) 20 MG tablet Take 2 tablets (40 mg total) by mouth once daily. for 5 days    TRADJENTA 5 mg Tab tablet TAKE ONE(1) TABLET BY MOUTH EVERY DAY    [DISCONTINUED] amiodarone (PACERONE) 200 MG Tab Take by mouth once daily.    [DISCONTINUED] captopriL (CAPOTEN) 25 MG tablet Take 25 mg by mouth 2 (two) times daily.    [DISCONTINUED] diclofenac sodium (VOLTAREN ARTHRITIS PAIN) 1 % Gel Apply 2 g topically once daily.    [DISCONTINUED] docosahexaenoic acid-epa 120-180 mg Cap Take 2 capsules by mouth.    [DISCONTINUED] escitalopram oxalate (LEXAPRO) 10 MG tablet Take 10 mg by mouth once daily.    [DISCONTINUED] famotidine (PEPCID) 20 MG tablet Take 1 tablet (20 mg total) by mouth 2 (two) times daily.    [DISCONTINUED] febuxostat (ULORIC) 40 mg Tab Take 20 mg by mouth.    [DISCONTINUED] GAVILYTE-C 240-22.72-6.72 -5.84 gram SolR USE AS DIRECTED    [DISCONTINUED] glucagon 1 mg SolR Inject as directed.    [DISCONTINUED] loratadine (CLARITIN) 10 mg tablet Take 1 tablet (10 mg  total) by mouth once daily.    [DISCONTINUED] olopatadine (PATANOL) 0.1 % ophthalmic solution 1 drop.    [DISCONTINUED] omeprazole (PRILOSEC) 20 MG capsule TAKE 1 CAPSULE BY MOUTH EVERY DAY IN THE MORNING    [DISCONTINUED] pravastatin (PRAVACHOL) 40 MG tablet Take 40 mg by mouth once daily.      [DISCONTINUED] ranitidine (ZANTAC) 150 MG capsule Take 150 mg by mouth.    [DISCONTINUED] sertraline (ZOLOFT) 50 MG tablet Take 50 mg by mouth once daily.    [DISCONTINUED] TRESIBA FLEXTOUCH U-100 100 unit/mL (3 mL) InPn SMARTSI Unit(s) SUB-Q Every Morning     Family History       Problem Relation (Age of Onset)    Breast cancer Sister    Colon cancer Sister          Tobacco Use    Smoking status: Former     Types: Cigarettes    Smokeless tobacco: Never   Substance and Sexual Activity    Alcohol use: No    Drug use: No    Sexual activity: Never     Review of Systems   Constitutional:  Negative for activity change, appetite change and fever.   HENT:  Negative for sore throat and trouble swallowing.    Respiratory: Negative.     Cardiovascular: Negative.    Gastrointestinal: Negative.    Genitourinary: Negative.    Skin: Negative.    Neurological:  Positive for weakness.   Psychiatric/Behavioral: Negative.     Objective:     Vital Signs (Most Recent):  Temp: 97.5 °F (36.4 °C) (23)  Pulse: 75 (23)  Resp: 18 (23)  BP: (!) 139/101 (23)  SpO2: 95 % (23)   Vital Signs (24h Range):  Temp:  [97.5 °F (36.4 °C)-98.3 °F (36.8 °C)] 97.5 °F (36.4 °C)  Pulse:  [68-75] 75  Resp:  [14-18] 18  SpO2:  [95 %-100 %] 95 %  BP: (130-172)/() 139/101     Weight: 94.3 kg (208 lb)  Body mass index is 35.7 kg/m².    Physical Exam  Vitals and nursing note reviewed.   Constitutional:       General: She is not in acute distress.     Appearance: She is obese.   HENT:      Head: Normocephalic and atraumatic.      Mouth/Throat:      Mouth: Mucous membranes are moist.      Pharynx: Oropharynx is  clear. No oropharyngeal exudate or posterior oropharyngeal erythema.      Comments: No tongue swelling  Eyes:      Extraocular Movements: Extraocular movements intact.      Pupils: Pupils are equal, round, and reactive to light.   Neck:      Comments: No bruit or stridor  Cardiovascular:      Rate and Rhythm: Normal rate and regular rhythm.      Pulses: Normal pulses.      Heart sounds: No murmur heard.  Pulmonary:      Effort: Pulmonary effort is normal. No respiratory distress.      Breath sounds: No wheezing or rales.   Abdominal:      General: There is no distension.      Palpations: Abdomen is soft.      Tenderness: There is no abdominal tenderness.   Musculoskeletal:      Cervical back: Neck supple. No rigidity.      Right lower leg: No edema.      Left lower leg: No edema.   Skin:     General: Skin is warm and dry.   Neurological:      Mental Status: She is alert.      Comments: No asterixis         CRANIAL NERVES     CN III, IV, VI   Pupils are equal, round, and reactive to light.     Significant Labs: All pertinent labs within the past 24 hours have been reviewed.  CBC:   Recent Labs   Lab 05/03/23  1314   WBC 5.60   HGB 12.7   HCT 39.1        CMP:   Recent Labs   Lab 05/03/23  1314      K 3.2*      CO2 30*   *   BUN 22   CREATININE 1.4   CALCIUM 9.6   PROT 6.5   ALBUMIN 3.7   BILITOT 1.1*   ALKPHOS 78   AST 24   ALT 24   ANIONGAP 6*     Cardiac Markers: No results for input(s): CKMB, MYOGLOBIN, BNP, TROPISTAT in the last 48 hours.  Coagulation:   Recent Labs   Lab 05/03/23  1327   INR 1.5*     Lactic Acid: No results for input(s): LACTATE in the last 48 hours.  Magnesium: No results for input(s): MG in the last 48 hours.  Troponin: No results for input(s): TROPONINI, TROPONINIHS in the last 48 hours.  Urine Studies:   Recent Labs   Lab 05/03/23  1945   COLORU Yellow   APPEARANCEUA Clear   PHUR 6.0   SPECGRAV >1.030*   PROTEINUA Negative   GLUCUA 2+*   KETONESU Negative    BILIRUBINUA Negative   OCCULTUA Trace*   NITRITE Negative   LEUKOCYTESUR Trace*   RBCUA 2   WBCUA 15*   BACTERIA Occasional   SQUAMEPITHEL 0       Significant Imaging: I have reviewed all pertinent imaging results/findings within the past 24 hours.    EXAMINATION:  CTA STROKE MULTI-PHASE     CLINICAL HISTORY:  Neuro deficit, acute, stroke suspected;     TECHNIQUE:  Non contrast low dose axial images were obtained thought the head. CT angiogram was performed from the level of the fior to the top of the head following the IV administration of 100mL of Omnipaque 350.   Sagittal and coronal reconstructions and maximum intensity projection reconstructions were performed. Arterial stenosis percentages are based on NASCET measurement criteria.  Two additional phases of immediate post-contrast CTA images were performed through the head alone.     COMPARISON:  None     FINDINGS:  CT head: No midline shift, hydrocephalus or mass effect.  No acute intracranial hemorrhage or CT evidence of acute major vascular territory infarct.  No abnormal extra-axial fluid collections.     CTA head and neck:     Moderate atherosclerosis of the aortic arch.  Marked tortuosity of the aortic branch vessels which are patent.  There is significant artifact from left chest wall AICD.  Small venous collaterals in the left chest wall and paraspinal regions.     Bilateral common carotid arteries are patent.  There is mild atherosclerosis at the carotid bifurcations without significant stenosis.  Medial deviation of the left carotid artery.  Cervical internal carotid arteries are tortuous bilaterally but patent.  Petrous, cavernous and supraclinoid segments of the internal carotid arteries are patent.  Small (3 mm) wide neck outpouching at the left MCA bifurcation likely an incidental aneurysm.  The major anterior and middle cerebral artery branches are patent.     Vertebral arteries are patent bilaterally.  Basilar and major posterior cerebral  artery branches are patent.     The major dural venous sinuses are patent.     Nonvascular structures: Orbits show no significant abnormalities.  Parotid, submandibular and thyroid gland shows no significant abnormalities.  Heart is enlarged.  Partially visualized AICD.  There are coronary artery calcifications.  Visualized portions of the lung apices show no significant abnormalities.     Osseous structures show no acute abnormalities.     Impression:     CT head shows no acute intracranial hemorrhage or CT evidence of acute major vascular territory infarct.     CTA shows no high-grade stenosis or large vessel occlusion.     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        Electronically signed by: Castro Killian MD  Date:                                            05/03/2023  Time:                                           13:54

## 2023-05-04 NOTE — H&P
"[facility] [address] [place] [address] Medicine  History & Physical    Patient Name: Nyasia Frye  MRN: [id number]  Patient Class: OP- Observation  Admission Date: 5/3/2023  Attending Physician: Leydi Briseno MD [facility]  Admitting Physician: Marshall Mills MD  Primary Care Provider: Marianne Padilla MD      Patient information was obtained from patient, past medical records and ER records.     Subjective:     Principal Problem:Episode of transient neurologic symptoms    Chief Complaint:   Chief Complaint   Patient presents with    Dizziness     Pt reports "drooling off and on" for a couple of weeks; states her "tongue feels heavy." Also endorsing dizziness, headache.        HPI: 83-year-old woman with a history of nonischemic cardiomyopathy, status post ICD, type 2 diabetes, atrial fibrillation, hypertension presents to the emergency department with complaints of slurred speech and dysarthria.  Earlier today she had the onset of dysarthria slurred speech and weakness without the presence of any unilateral neurologic deficits no paresthesias no numbness.  Family brought patient to the hospital for concern she might be having a stroke she has no prior history of a stroke.  She took all home medicines as directed earlier today.    Initial workup was concerning for stroke alert had CTA stroke protocol and was evaluated by vascular Neurology, CT without signs of acute hemorrhage infarct or insult occlusion.  It was also noted that her symptoms were resolving in the emergency department complete MRI.  Due to her ICD it requires device rep to place into a compatible format for MRI brain to be completed.    Lab workup shows mild hypokalemia patient is on b.i.d. furosemide reports she was taken off of potassium supplementation recently.       Past Medical History:   Diagnosis Date    *Atrial fibrillation     Allergy     Atrial fibrillation     Controlled gout     Diabetes mellitus type II     Hypercholesteremia "     Hyperlipidemia     Hypertension     Irritable bowel syndrome (IBS)     Osteoporosis, senile        Past Surgical History:   Procedure Laterality Date    CARDIAC DEFIBRILLATOR PLACEMENT      OOPHORECTOMY      TUBAL LIGATION         Review of patient's allergies indicates:   Allergen Reactions    Beta-adrenergic agents     Ciprofloxacin Other (See Comments)    Clindamycin      rash    Fluticasone     Metformin     Penicillins      Eye swelling     Verapamil     Zyloprim [allopurinol]        No current facility-administered medications on file prior to encounter.     Current Outpatient Medications on File Prior to Encounter   Medication Sig    acetaminophen (TYLENOL) 500 MG tablet Take 1 tablet (500 mg total) by mouth every 6 (six) hours as needed for Pain (and fever).    albuterol (PROVENTIL/VENTOLIN HFA) 90 mcg/actuation inhaler Inhale 2 puffs into the lungs.    atorvastatin (LIPITOR) 40 MG tablet Take 40 mg by mouth.    busPIRone (BUSPAR) 5 MG Tab Take 5 mg by mouth 2 (two) times daily.    carvediloL (COREG) 3.125 MG tablet Take 1 tablet (3.125 mg total) by mouth 2 (two) times daily with meals.    cholecalciferol, vitamin D3, (VITAMIN D3) 25 mcg (1,000 unit) capsule Take 1,000 Units by mouth once daily.    cycloSPORINE (RESTASIS) 0.05 % ophthalmic emulsion Apply 1 drop to eye every 12 (twelve) hours.    dicyclomine (BENTYL) 10 MG capsule Take 10 mg by mouth 4 (four) times daily before meals and nightly.    ELIQUIS 2.5 mg Tab Take 2.5 mg by mouth 2 (two) times daily.    ergocalciferol (ERGOCALCIFEROL) 50,000 unit Cap Take 50,000 Units by mouth every 30 days.    fish oil-omega-3 fatty acids 300-1,000 mg capsule Take 2 g by mouth once daily.      fluticasone propionate (FLONASE) 50 mcg/actuation nasal spray 1 spray (50 mcg total) by Each Nostril route 2 (two) times daily.    furosemide (LASIX) 40 MG tablet Take 40 mg by mouth 2 (two) times daily.    JARDIANCE 25 mg tablet Take 25 mg by  mouth once daily.    levalbuterol (XOPENEX) 1.25 mg/3 mL nebulizer solution Take 3 mLs (1.25 mg total) by nebulization every 6 (six) hours as needed for Wheezing. Rescue    losartan (COZAAR) 100 MG tablet Take 100 mg by mouth once daily.    multivitamin with minerals tablet Take by mouth.    mv-min-folic acid-lutein 500-250 mcg Chew Take 1 tablet by mouth.    nitroGLYCERIN (NITROSTAT) 0.4 MG SL tablet Place 1 tablet (0.4 mg total) under the tongue every 5 (five) minutes as needed for Chest pain.    pantoprazole (PROTONIX) 20 MG tablet Take 20 mg by mouth every morning.    APPLE CIDER VINEGAR ORAL Take 450 mg by mouth once daily.    denosumab (PROLIA) 60 mg/mL Syrg Inject 1 mL (60 mg total) into the skin every 6 (six) months.    econazole nitrate 1 % cream Apply topically 2 (two) times daily.    predniSONE (DELTASONE) 20 MG tablet Take 2 tablets (40 mg total) by mouth once daily. for 5 days    TRADJENTA 5 mg Tab tablet TAKE ONE(1) TABLET BY MOUTH EVERY DAY    [DISCONTINUED] amiodarone (PACERONE) 200 MG Tab Take by mouth once daily.    [DISCONTINUED] captopriL (CAPOTEN) 25 MG tablet Take 25 mg by mouth 2 (two) times daily.    [DISCONTINUED] diclofenac sodium (VOLTAREN ARTHRITIS PAIN) 1 % Gel Apply 2 g topically once daily.    [DISCONTINUED] docosahexaenoic acid-epa 120-180 mg Cap Take 2 capsules by mouth.    [DISCONTINUED] escitalopram oxalate (LEXAPRO) 10 MG tablet Take 10 mg by mouth once daily.    [DISCONTINUED] famotidine (PEPCID) 20 MG tablet Take 1 tablet (20 mg total) by mouth 2 (two) times daily.    [DISCONTINUED] febuxostat (ULORIC) 40 mg Tab Take 20 mg by mouth.    [DISCONTINUED] GAVILYTE-C 240-22.72-6.72 -5.84 gram SolR USE AS DIRECTED    [DISCONTINUED] glucagon 1 mg SolR Inject as directed.    [DISCONTINUED] loratadine (CLARITIN) 10 mg tablet Take 1 tablet (10 mg total) by mouth once daily.    [DISCONTINUED] olopatadine (PATANOL) 0.1 % ophthalmic solution 1 drop.    [DISCONTINUED]  omeprazole (PRILOSEC) 20 MG capsule TAKE 1 CAPSULE BY MOUTH EVERY DAY IN THE MORNING    [DISCONTINUED] pravastatin (PRAVACHOL) 40 MG tablet Take 40 mg by mouth once daily.      [DISCONTINUED] ranitidine (ZANTAC) 150 MG capsule Take 150 mg by mouth.    [DISCONTINUED] sertraline (ZOLOFT) 50 MG tablet Take 50 mg by mouth once daily.    [DISCONTINUED] TRESIBA FLEXTOUCH U-100 100 unit/mL (3 mL) InPn SMARTSI Unit(s) SUB-Q Every Morning     Family History       Problem Relation (Age of Onset)    Breast cancer Sister    Colon cancer Sister          Tobacco Use    Smoking status: Former     Types: Cigarettes    Smokeless tobacco: Never   Substance and Sexual Activity    Alcohol use: No    Drug use: No    Sexual activity: Never     Review of Systems   Constitutional:  Negative for activity change, appetite change and fever.   HENT:  Negative for sore throat and trouble swallowing.    Respiratory: Negative.     Cardiovascular: Negative.    Gastrointestinal: Negative.    Genitourinary: Negative.    Skin: Negative.    Neurological:  Positive for weakness.   Psychiatric/Behavioral: Negative.     Objective:     Vital Signs (Most Recent):  Temp: 97.5 °F (36.4 °C) (23)  Pulse: 75 (23)  Resp: 18 (23)  BP: (!) 139/101 (23)  SpO2: 95 % (23)   Vital Signs (24h Range):  Temp:  [97.5 °F (36.4 °C)-98.3 °F (36.8 °C)] 97.5 °F (36.4 °C)  Pulse:  [68-75] 75  Resp:  [14-18] 18  SpO2:  [95 %-100 %] 95 %  BP: (130-172)/() 139/101     Weight: 94.3 kg (208 lb)  Body mass index is 35.7 kg/m².    Physical Exam  Vitals and nursing note reviewed.   Constitutional:       General: She is not in acute distress.     Appearance: She is obese.   HENT:      Head: Normocephalic and atraumatic.      Mouth/Throat:      Mouth: Mucous membranes are moist.      Pharynx: Oropharynx is clear. No oropharyngeal exudate or posterior oropharyngeal erythema.      Comments: No tongue swelling  Eyes:       Extraocular Movements: Extraocular movements intact.      Pupils: Pupils are equal, round, and reactive to light.   Neck:      Comments: No bruit or stridor  Cardiovascular:      Rate and Rhythm: Normal rate and regular rhythm.      Pulses: Normal pulses.      Heart sounds: No murmur heard.  Pulmonary:      Effort: Pulmonary effort is normal. No respiratory distress.      Breath sounds: No wheezing or rales.   Abdominal:      General: There is no distension.      Palpations: Abdomen is soft.      Tenderness: There is no abdominal tenderness.   Musculoskeletal:      Cervical back: Neck supple. No rigidity.      Right lower leg: No edema.      Left lower leg: No edema.   Skin:     General: Skin is warm and dry.   Neurological:      Mental Status: She is alert.      Comments: No asterixis         CRANIAL NERVES     CN III, IV, VI   Pupils are equal, round, and reactive to light.     Significant Labs: All pertinent labs within the past 24 hours have been reviewed.  CBC:   Recent Labs   Lab 05/03/23  1314   WBC 5.60   HGB 12.7   HCT 39.1        CMP:   Recent Labs   Lab 05/03/23  1314      K 3.2*      CO2 30*   *   BUN 22   CREATININE 1.4   CALCIUM 9.6   PROT 6.5   ALBUMIN 3.7   BILITOT 1.1*   ALKPHOS 78   AST 24   ALT 24   ANIONGAP 6*     Cardiac Markers: No results for input(s): CKMB, MYOGLOBIN, BNP, TROPISTAT in the last 48 hours.  Coagulation:   Recent Labs   Lab 05/03/23  1327   INR 1.5*     Lactic Acid: No results for input(s): LACTATE in the last 48 hours.  Magnesium: No results for input(s): MG in the last 48 hours.  Troponin: No results for input(s): TROPONINI, TROPONINIHS in the last 48 hours.  Urine Studies:   Recent Labs   Lab 05/03/23  1945   COLORU Yellow   APPEARANCEUA Clear   PHUR 6.0   SPECGRAV >1.030*   PROTEINUA Negative   GLUCUA 2+*   KETONESU Negative   BILIRUBINUA Negative   OCCULTUA Trace*   NITRITE Negative   LEUKOCYTESUR Trace*   RBCUA 2   WBCUA 15*   BACTERIA  Occasional   SQUAMEPITHEL 0       Significant Imaging: I have reviewed all pertinent imaging results/findings within the past 24 hours.    EXAMINATION:  CTA STROKE MULTI-PHASE     CLINICAL HISTORY:  Neuro deficit, acute, stroke suspected;     TECHNIQUE:  Non contrast low dose axial images were obtained thought the head. CT angiogram was performed from the level of the fior to the top of the head following the IV administration of 100mL of Omnipaque 350.   Sagittal and coronal reconstructions and maximum intensity projection reconstructions were performed. Arterial stenosis percentages are based on NASCET measurement criteria.  Two additional phases of immediate post-contrast CTA images were performed through the head alone.     COMPARISON:  None     FINDINGS:  CT head: No midline shift, hydrocephalus or mass effect.  No acute intracranial hemorrhage or CT evidence of acute major vascular territory infarct.  No abnormal extra-axial fluid collections.     CTA head and neck:     Moderate atherosclerosis of the aortic arch.  Marked tortuosity of the aortic branch vessels which are patent.  There is significant artifact from left chest wall AICD.  Small venous collaterals in the left chest wall and paraspinal regions.     Bilateral common carotid arteries are patent.  There is mild atherosclerosis at the carotid bifurcations without significant stenosis.  Medial deviation of the left carotid artery.  Cervical internal carotid arteries are tortuous bilaterally but patent.  Petrous, cavernous and supraclinoid segments of the internal carotid arteries are patent.  Small (3 mm) wide neck outpouching at the left MCA bifurcation likely an incidental aneurysm.  The major anterior and middle cerebral artery branches are patent.     Vertebral arteries are patent bilaterally.  Basilar and major posterior cerebral artery branches are patent.     The major dural venous sinuses are patent.     Nonvascular structures: Orbits show no  significant abnormalities.  Parotid, submandibular and thyroid gland shows no significant abnormalities.  Heart is enlarged.  Partially visualized AICD.  There are coronary artery calcifications.  Visualized portions of the lung apices show no significant abnormalities.     Osseous structures show no acute abnormalities.     Impression:     CT head shows no acute intracranial hemorrhage or CT evidence of acute major vascular territory infarct.     CTA shows no high-grade stenosis or large vessel occlusion.     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        Electronically signed by: Castro Killian MD  Date:                                            05/03/2023  Time:                                           13:54    Assessment/Plan:     * Episode of transient neurologic symptoms  Transient dysarthria, no slurred speech now  -patient still comments on tongue heaviness  -weakness improved  -mild hypokalemia on labs - no other acute findings on imaging to explain symptom  -she is on multiple medications, possible side effect - PharmD consult to assist review if any medications could be culprit, symptoms started after AM meds taken.   -MRI brain recommended by vascular neurology - ED has obtaind ICD device information (see media tab) and sent to CT scanner.  A Cardiac Device Check order placed, would contact EP/Device clinic in AM to assist in having her ICD placed into a compatible mode for MRI to definitively rule out stroke.       Hypokalemia  Received oral potassium in ED  -repeat in Am and replete if needed  -may need low dose potassium supplement given her BID furosemide use      Primary hypertension  Resume home medications - carvedilol and losartan       Type 2 diabetes mellitus with hyperglycemia, without long-term current use of insulin  Patient's FSGs are controlled on current medication regimen.  Last A1c reviewed-   Lab Results   Component Value Date    HGBA1C 6.2 (H) 03/29/2023     Most  recent fingerstick glucose reviewed-   Recent Labs   Lab 05/03/23  1305   POCTGLUCOSE 165*     Current correctional scale  Low  Maintain anti-hyperglycemic dose as follows-   Antihyperglycemics (From admission, onward)    Start     Stop Route Frequency Ordered    05/04/23 0032  insulin aspart U-100 pen 0-5 Units         -- SubQ Before meals & nightly PRN 05/03/23 2332        Hold Oral hypoglycemics while patient is in the hospital.    Chronic atrial fibrillation  Patient with Persistent (7 days or more) atrial fibrillation which is controlled currently with Beta Blocker. Patient is currently in sinus rhythm.[id number] Score: 5. HASBLED Score: . Anticoagulation indicated. Anticoagulation done with eliquis.          VTE Risk Mitigation (From admission, onward)         Ordered     apixaban tablet 2.5 mg  2 times daily         05/03/23 2318     Reason for No Pharmacological VTE Prophylaxis  Once        Question:  Reasons:  Answer:  Already adequately anticoagulated on oral Anticoagulants    05/03/23 2318     IP VTE HIGH RISK PATIENT  Once         05/03/23 2318     Place sequential compression device  Until discontinued         05/03/23 2318                   On 05/03/2023, patient should be placed in hospital observation services under my care.        Marshall Mills MD  Department of Hospital Medicine  [facility]

## 2023-05-04 NOTE — ASSESSMENT & PLAN NOTE
Patient with Persistent (7 days or more) atrial fibrillation which is controlled currently with Beta Blocker. Patient is currently in sinus rhythm.[id number] Score: 5. HASBLED Score: . Anticoagulation indicated. Anticoagulation done with eliquis.

## 2023-05-04 NOTE — ASSESSMENT & PLAN NOTE
Patient's FSGs are controlled on current medication regimen.  Last A1c reviewed-   Lab Results   Component Value Date    HGBA1C 6.2 (H) 03/29/2023     Most recent fingerstick glucose reviewed-   Recent Labs   Lab 05/03/23  1305   POCTGLUCOSE 165*     Current correctional scale  Low  Maintain anti-hyperglycemic dose as follows-   Antihyperglycemics (From admission, onward)    Start     Stop Route Frequency Ordered    05/04/23 0032  insulin aspart U-100 pen 0-5 Units         -- SubQ Before meals & nightly PRN 05/03/23 2332        Hold Oral hypoglycemics while patient is in the hospital.

## 2023-05-04 NOTE — NURSING
patient arrived for scheduled MRI, patient assisted to MRI table without difficulty, cardiac pacemaker/defibrillator placed in MRI safe mode per [employer] Rep., MRI safe cardiac monitor and pulse ox. applied, heart rate 69, O2 sat.100% on RA, NAD noted at this time, will continue to monitor patient throughout MRI.

## 2023-05-04 NOTE — ASSESSMENT & PLAN NOTE
Received oral potassium in ED  -repeat in Am and replete if needed  -may need low dose potassium supplement given her BID furosemide use

## 2023-05-04 NOTE — ASSESSMENT & PLAN NOTE
CTA head showing small (3 mm) wide neck outpouching at the left MCA bifurcation likely an incidental aneurysm.  The major anterior and middle cerebral artery branches are patent.   - referral to neurology outpatient for follow up placed

## 2023-05-04 NOTE — ASSESSMENT & PLAN NOTE
Transient dysarthria, no slurred speech now  -mild hypokalemia on labs, now resolved   - no other acute findings on imaging to explain symptoms  - PharmD consult to assist review if any medications could be culprit   Seems low likelihood this is a medication induced event.  -

## 2023-05-04 NOTE — ASSESSMENT & PLAN NOTE
- creatinine appears to be at baseline   - avoid nephrotoxic agents as able   - monitor kidney function on cmp

## 2023-05-05 VITALS
WEIGHT: 208 LBS | OXYGEN SATURATION: 100 % | TEMPERATURE: 99 F | RESPIRATION RATE: 18 BRPM | SYSTOLIC BLOOD PRESSURE: 144 MMHG | HEIGHT: 64 IN | BODY MASS INDEX: 35.51 KG/M2 | DIASTOLIC BLOOD PRESSURE: 82 MMHG | HEART RATE: 71 BPM

## 2023-05-05 LAB
ANION GAP SERPL CALC-SCNC: 8 MMOL/L (ref 8–16)
BUN SERPL-MCNC: 24 MG/DL (ref 8–23)
CALCIUM SERPL-MCNC: 9.6 MG/DL (ref 8.7–10.5)
CHLORIDE SERPL-SCNC: 103 MMOL/L (ref 95–110)
CO2 SERPL-SCNC: 28 MMOL/L (ref 23–29)
CREAT SERPL-MCNC: 1.2 MG/DL (ref 0.5–1.4)
EST. GFR  (NO RACE VARIABLE): 44.9 ML/MIN/1.73 M^2
GLUCOSE SERPL-MCNC: 119 MG/DL (ref 70–110)
MAGNESIUM SERPL-MCNC: 2 MG/DL (ref 1.6–2.6)
PHOSPHATE SERPL-MCNC: 3.4 MG/DL (ref 2.7–4.5)
POCT GLUCOSE: 114 MG/DL (ref 70–110)
POCT GLUCOSE: 133 MG/DL (ref 70–110)
POTASSIUM SERPL-SCNC: 3.4 MMOL/L (ref 3.5–5.1)
SODIUM SERPL-SCNC: 139 MMOL/L (ref 136–145)

## 2023-05-05 PROCEDURE — 90791 PSYCH DIAGNOSTIC EVALUATION: CPT | Mod: ,,, | Performed by: STUDENT IN AN ORGANIZED HEALTH CARE EDUCATION/TRAINING PROGRAM

## 2023-05-05 PROCEDURE — G0378 HOSPITAL OBSERVATION PER HR: HCPCS

## 2023-05-05 PROCEDURE — 80048 BASIC METABOLIC PNL TOTAL CA: CPT | Performed by: HOSPITALIST

## 2023-05-05 PROCEDURE — 25000003 PHARM REV CODE 250

## 2023-05-05 PROCEDURE — 83735 ASSAY OF MAGNESIUM: CPT | Performed by: HOSPITALIST

## 2023-05-05 PROCEDURE — 90791 PR PSYCHIATRIC DIAGNOSTIC EVALUATION: ICD-10-PCS | Mod: ,,, | Performed by: STUDENT IN AN ORGANIZED HEALTH CARE EDUCATION/TRAINING PROGRAM

## 2023-05-05 PROCEDURE — 99239 PR HOSPITAL DISCHARGE DAY,>30 MIN: ICD-10-PCS | Mod: ,,,

## 2023-05-05 PROCEDURE — 84100 ASSAY OF PHOSPHORUS: CPT | Performed by: HOSPITALIST

## 2023-05-05 PROCEDURE — 99239 HOSP IP/OBS DSCHRG MGMT >30: CPT | Mod: ,,,

## 2023-05-05 PROCEDURE — 25000003 PHARM REV CODE 250: Performed by: HOSPITALIST

## 2023-05-05 PROCEDURE — 36415 COLL VENOUS BLD VENIPUNCTURE: CPT | Performed by: HOSPITALIST

## 2023-05-05 RX ORDER — POTASSIUM CHLORIDE 20 MEQ/1
40 TABLET, EXTENDED RELEASE ORAL ONCE
Status: COMPLETED | OUTPATIENT
Start: 2023-05-05 | End: 2023-05-05

## 2023-05-05 RX ADMIN — THERA TABS 1 TABLET: TAB at 08:05

## 2023-05-05 RX ADMIN — FLUTICASONE PROPIONATE 50 MCG: 50 SPRAY, METERED NASAL at 08:05

## 2023-05-05 RX ADMIN — PANTOPRAZOLE SODIUM 20 MG: 20 TABLET, DELAYED RELEASE ORAL at 06:05

## 2023-05-05 RX ADMIN — Medication 2 CAPSULE: at 08:05

## 2023-05-05 RX ADMIN — CARVEDILOL 3.12 MG: 3.12 TABLET, FILM COATED ORAL at 08:05

## 2023-05-05 RX ADMIN — ATORVASTATIN CALCIUM 40 MG: 40 TABLET, FILM COATED ORAL at 08:05

## 2023-05-05 RX ADMIN — DICYCLOMINE HYDROCHLORIDE 10 MG: 10 CAPSULE ORAL at 06:05

## 2023-05-05 RX ADMIN — APIXABAN 2.5 MG: 2.5 TABLET, FILM COATED ORAL at 08:05

## 2023-05-05 RX ADMIN — DICYCLOMINE HYDROCHLORIDE 10 MG: 10 CAPSULE ORAL at 12:05

## 2023-05-05 RX ADMIN — Medication 1000 UNITS: at 08:05

## 2023-05-05 RX ADMIN — BUSPIRONE HYDROCHLORIDE 5 MG: 5 TABLET ORAL at 08:05

## 2023-05-05 RX ADMIN — HYPROMELLOSE 2910 2 DROP: 5 SOLUTION/ DROPS OPHTHALMIC at 10:05

## 2023-05-05 RX ADMIN — FUROSEMIDE 40 MG: 40 TABLET ORAL at 08:05

## 2023-05-05 RX ADMIN — LOSARTAN POTASSIUM 100 MG: 50 TABLET, FILM COATED ORAL at 08:05

## 2023-05-05 RX ADMIN — POTASSIUM CHLORIDE 40 MEQ: 1500 TABLET, EXTENDED RELEASE ORAL at 08:05

## 2023-05-05 RX ADMIN — HYPROMELLOSE 2910 2 DROP: 5 SOLUTION/ DROPS OPHTHALMIC at 06:05

## 2023-05-05 NOTE — PLAN OF CARE
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]  Yes    Hypokalemia [E87.6]  Yes    Morbid obesity [E66.01]  Yes     Chronic    IBS (irritable bowel syndrome) [K58.9]  Yes     Chronic    Stage 3b chronic kidney disease [N18.32]  Yes     Update for Diagnosis Load        ICD (implantable cardioverter-defibrillator) in place [Z95.810]  Yes     Chronic    Ischemic congestive cardiomyopathy [I25.5, I42.0]  Yes    Type 2 diabetes mellitus with hyperglycemia, without long-term current use of insulin [E11.65]  Yes    Primary hypertension [I10]  Yes    Chronic atrial fibrillation [I48.20]  Yes    Hyperlipidemia [E78.5]  Yes     Chronic      Resolved Hospital Problems   No resolved problems to display.       Follow Up Appointments:  No future appointments.    Allergies:  Review of patient's allergies indicates:   Allergen Reactions    Beta-adrenergic agents     Ciprofloxacin Other (See Comments)    Clindamycin      rash    Fluticasone     Metformin     Penicillins      Eye swelling     Verapamil     Zyloprim [allopurinol]        Medications: Review discharge medications with patient and family and provide education.    Current Facility-Administered Medications   Medication Dose Route Frequency Provider Last Rate Last Admin    acetaminophen tablet 650 mg  650 mg Oral Q4H PRN Marshall Mills MD   650 mg at 05/04/23 0823    albuterol inhaler 2 puff  2 puff Inhalation Q6H PRN Marshall Mills MD        apixaban tablet 2.5 mg  2.5 mg Oral BID Marshall Mills MD   2.5 mg at 05/04/23  2045    artificial tears 0.5 % ophthalmic solution 2 drop  2 drop Both Eyes Q4H While awake Marshall Mills MD   2 drop at 05/05/23 0606    atorvastatin tablet 40 mg  40 mg Oral Daily Marshall Mills MD   40 mg at 05/04/23 0824    busPIRone tablet 5 mg  5 mg Oral BID Marshall Mills MD   5 mg at 05/04/23 2045    carvediloL tablet 3.125 mg  3.125 mg Oral BID WM Marshall Mills MD   3.125 mg at 05/04/23 1616    dextrose 10% bolus 125 mL 125 mL  12.5 g Intravenous PRN Marshall Mills MD        dextrose 10% bolus 250 mL 250 mL  25 g Intravenous PRN Marshall Mills MD        dextrose 40 % gel 15,000 mg  15 g Oral PRN Marshall Mills MD        dextrose 40 % gel 30,000 mg  30 g Oral PRN Marshall Mills MD        dicyclomine capsule 10 mg  10 mg Oral QID (AC & HS) Marshall Mills MD   10 mg at 05/05/23 0605    fluticasone propionate 50 mcg/actuation nasal spray 50 mcg  1 spray Each Nostril BID Marshall Mills MD   50 mcg at 05/04/23 2045    furosemide tablet 40 mg  40 mg Oral BID Marshall Mills MD   40 mg at 05/04/23 2045    glucagon (human recombinant) injection 1 mg  1 mg Intramuscular PRN Marshall Mills MD        insulin aspart U-100 pen 0-5 Units  0-5 Units Subcutaneous QID (AC + HS) PRN Marshall Mills MD        losartan tablet 100 mg  100 mg Oral Daily Marshall Mills MD   100 mg at 05/04/23 0824    melatonin tablet 6 mg  6 mg Oral Nightly PRN Marshall Mills MD        multivitamin tablet  1 tablet Oral Daily Marshall Mills MD   1 tablet at 05/04/23 0823    naloxone 0.4 mg/mL injection 0.02 mg  0.02 mg Intravenous PRN Marshall Mills MD        nitroGLYCERIN SL tablet 0.4 mg  0.4 mg Sublingual Q5 Min PRN Marshall Mills MD        omega 3-dha-epa-fish oil capsule 2 capsule  2 capsule Oral Daily Marshall Mills MD   2 capsule at 05/04/23 0828    ondansetron injection 4 mg  4 mg Intravenous Q8H PRN Marshall Mills MD        pantoprazole EC tablet 20 mg  20 mg Oral GENARO Olivares  MD Lina   20 mg at 05/05/23 0606    polyethylene glycol packet 17 g  17 g Oral BID PRN Marshall Mills MD        potassium chloride SA CR tablet 40 mEq  40 mEq Oral Once Brittney Segura PA-C        prochlorperazine injection Soln 5 mg  5 mg Intravenous Q6H PRN Marshall Mills MD        senna-docusate 8.6-50 mg per tablet 1 tablet  1 tablet Oral Daily Marshall Mills MD        sodium chloride 0.9% flush 10 mL  10 mL Intravenous Q6H PRN Marshall Mills MD        vitamin D 1000 units tablet 1,000 Units  1,000 Units Oral Daily Marshall Mills MD   1,000 Units at 05/04/23 0824     Current Discharge Medication List        CONTINUE these medications which have NOT CHANGED    Details   acetaminophen (TYLENOL) 500 MG tablet Take 1 tablet (500 mg total) by mouth every 6 (six) hours as needed for Pain (and fever).  Qty: 30 tablet, Refills: 0      albuterol (PROVENTIL/VENTOLIN HFA) 90 mcg/actuation inhaler Inhale 2 puffs into the lungs.      atorvastatin (LIPITOR) 40 MG tablet Take 40 mg by mouth.      busPIRone (BUSPAR) 5 MG Tab Take 5 mg by mouth 2 (two) times daily.      carvediloL (COREG) 3.125 MG tablet Take 1 tablet (3.125 mg total) by mouth 2 (two) times daily with meals.  Qty: 60 tablet, Refills: 5    Comments: .      cholecalciferol, vitamin D3, (VITAMIN D3) 25 mcg (1,000 unit) capsule Take 1,000 Units by mouth once daily.      cycloSPORINE (RESTASIS) 0.05 % ophthalmic emulsion Apply 1 drop to eye every 12 (twelve) hours.      dicyclomine (BENTYL) 10 MG capsule Take 10 mg by mouth 4 (four) times daily before meals and nightly.      ELIQUIS 2.5 mg Tab Take 2.5 mg by mouth 2 (two) times daily.      ergocalciferol (ERGOCALCIFEROL) 50,000 unit Cap Take 50,000 Units by mouth every 30 days.      fish oil-omega-3 fatty acids 300-1,000 mg capsule Take 2 g by mouth once daily.        furosemide (LASIX) 40 MG tablet Take 40 mg by mouth 2 (two) times daily.  Refills: 3      JARDIANCE 25 mg tablet Take 25 mg by  mouth once daily.      levalbuterol (XOPENEX) 1.25 mg/3 mL nebulizer solution Take 3 mLs (1.25 mg total) by nebulization every 6 (six) hours as needed for Wheezing. Rescue  Qty: 30 each, Refills: 0      losartan (COZAAR) 100 MG tablet Take 100 mg by mouth once daily.      multivitamin with minerals tablet Take by mouth.      mv-min-folic acid-lutein 500-250 mcg Chew Take 1 tablet by mouth.      nitroGLYCERIN (NITROSTAT) 0.4 MG SL tablet Place 1 tablet (0.4 mg total) under the tongue every 5 (five) minutes as needed for Chest pain.  Qty: 30 tablet, Refills: 1      pantoprazole (PROTONIX) 20 MG tablet Take 20 mg by mouth every morning.      APPLE CIDER VINEGAR ORAL Take 450 mg by mouth once daily.      econazole nitrate 1 % cream Apply topically 2 (two) times daily.  Qty: 85 g, Refills: 1      TRADJENTA 5 mg Tab tablet TAKE ONE(1) TABLET BY MOUTH EVERY DAY  Refills: 4           STOP taking these medications       fluticasone propionate (FLONASE) 50 mcg/actuation nasal spray Comments:   Reason for Stopping:         denosumab (PROLIA) 60 mg/mL Syrg Comments:   Reason for Stopping:         predniSONE (DELTASONE) 20 MG tablet Comments:   Reason for Stopping:                 I have seen and examined this patient within the last 30 days. My clinical findings that support the need for the home health skilled services and home bound status are the following:no   Requiring assistive device to leave home due to unsteady gait caused by  Weakness/Debility.     Diet:   diabetic diet 2000 calorie and 2 gram sodium diet      Referrals/ Consults  Physical Therapy to evaluate and treat. Evaluate for home safety and equipment needs; Establish/upgrade home exercise program. Perform / instruct on therapeutic exercises, gait training, transfer training, and Range of Motion.  Occupational Therapy to evaluate and treat. Evaluate home environment for safety and equipment needs. Perform/Instruct on transfers, ADL training, ROM, and  therapeutic exercises.   to evaluate for community resources/long-range planning.  Aide to provide assistance with personal care, ADLs, and vital signs.    Activities:   activity as tolerated    Nursing:   Agency to admit patient within 24 hours of hospital discharge unless specified on physician order or at patient request    SN to complete comprehensive assessment including routine vital signs. Instruct on disease process and s/s of complications to report to MD. Review/verify medication list sent home with the patient at time of discharge  and instruct patient/caregiver as needed. Frequency may be adjusted depending on start of care date.     Skilled nurse to perform up to 3 visits PRN for symptoms related to diagnosis    Notify MD if SBP > 160 or < 90; DBP > 90 or < 50; HR > 120 or < 50; Temp > 101; O2 < 88%; Other:      Ok to schedule additional visits based on staff availability and patient request on consecutive days within the home health episode.    When multiple disciplines ordered:    Start of Care occurs on Sunday - Wednesday schedule remaining discipline evaluations as ordered on separate consecutive days following the start of care.    Thursday SOC -schedule subsequent evaluations Friday and Monday the following week.     Friday - Saturday SOC - schedule subsequent discipline evaluations on consecutive days starting Monday of the following week.    For all post-discharge communication and subsequent orders please contact patient's primary care physician. If unable to reach primary care physician or do not receive response within 30 minutes, please contact PCP for clinical staff order clarification    Miscellaneous   Diabetic Care:   SN to perform and educate Diabetic management with blood glucose monitoring:, Fingerstick blood sugar AC and HS, and Report CBG < 60 or > 350 to physician.    Home Health Aide:  Nursing Three times weekly, Physical Therapy Three times weekly, Occupational  Therapy Three times weekly, Medical Social Work Three times weekly, and Home Health Aide Three times weekly    Wound Care Orders  no    I certify that this patient is confined to her home and needs intermittent skilled nursing care, physical therapy, and occupational therapy.      Brittney Segura PA-C  [facility] Medicine   [facility]
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
## 2023-05-05 NOTE — DISCHARGE SUMMARY
[facility] - Observation [address] [place] [address] Medicine  Discharge Summary      Patient Name: Nyasia Frye  MRN: [id number]  RAAD: [id number]  Patient Class: OP- Observation  Admission Date: 5/3/2023  Hospital Length of Stay: 0 days  Discharge Date and Time: 5/5/2023  1:41 PM  Attending Physician: No att. providers found   Discharging Provider: Brittney Segura PA-C  Primary Care Provider: Marianne Padilla MD  [facility] Medicine Team: [facility] MED  Brittney Segura PA-C  Primary Care Team: [facility] MED F    HPI:   83-year-old woman with a history of nonischemic cardiomyopathy, status post ICD, type 2 diabetes, atrial fibrillation, hypertension presents to the emergency department with complaints of slurred speech and dysarthria.  Earlier today she had the onset of dysarthria slurred speech and weakness without the presence of any unilateral neurologic deficits no paresthesias no numbness.  Family brought patient to the hospital for concern she might be having a stroke she has no prior history of a stroke.  She took all home medicines as directed earlier today.    Initial workup was concerning for stroke alert had CTA stroke protocol and was evaluated by vascular Neurology, CT without signs of acute hemorrhage infarct or insult occlusion.  It was also noted that her symptoms were resolving in the emergency department complete MRI.  Due to her ICD it requires device rep to place into a compatible format for MRI brain to be completed.    Lab workup shows mild hypokalemia patient is on b.i.d. furosemide reports she was taken off of potassium supplementation recently.       * No surgery found *      Hospital Course:   Nyasia Frye was admitted to hospital medicine for management of transient neurological symptoms. Stroke code activated, CTA and MRI brain negative for acute infarction. CTA did find small (3 mm) wide neck outpouching at the left MCA bifurcation likely an incidental aneurysm, referral to neurology for outpatient  monitoring made prior to discharge. Patient with significant emotional stress as this time of year is the anniversary of several family members passing. Neurological symptoms completely resolved and did not return during hospital course. Psychology consulted, referral to outpatient grief counseling placed. UA borderline infectious, urine culture without significant growth. Referral to neurosurgery made prior to discharge for outpatient monitoring of aneurysm.     On day of discharge patient's vital signs were stable and patient appeared clinically ready for discharge. Hospital course, discharge plan and return precautions discussed - patient expressed understanding. All questions answered at that time.          Goals of Care Treatment Preferences:  Code Status: Full Code      Consults:   Consults (From admission, onward)        Status Ordering Provider     Hospital Medicine PharmD Consult  Once        Provider:  (Not yet assigned)    Completed SANTIAGO FIELDS     Inpatient consult to Vascular (Stroke) Neurology  Once        Provider:  (Not yet assigned)    Completed NAYANA NEIL          No new Assessment & Plan notes have been filed under this hospital service since the last note was generated.  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] 05/04/2023 Yes    Hypokalemia [E87.6] 05/03/2023 Yes    Morbid obesity [E66.01] 02/09/2021 Yes     Chronic    IBS (irritable bowel syndrome) [K58.9] 02/09/2021 Yes     Chronic    Stage 3b chronic kidney disease [N18.32] 11/13/2020 Yes    ICD (implantable cardioverter-defibrillator) in place [Z95.810] 11/12/2020 Yes     Chronic    Ischemic congestive cardiomyopathy [I25.5, 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] 11/12/2020 Yes    Type 2 diabetes mellitus with hyperglycemia, without long-term current use of insulin [E11.65]  03/01/2013 Yes    Primary hypertension [I10] 03/01/2013 Yes    Chronic atrial fibrillation [I48.20] 03/01/2013 Yes    Hyperlipidemia [E78.5] 03/01/2013 Yes     Chronic      Problems Resolved During this Admission:       Discharged Condition: good    Disposition: Home-Health Care [facility]    Follow Up:    Patient Instructions:      Ambulatory referral/consult to Psychology   Standing Status: Future   Referral Priority: Urgent Referral Type: Psychiatric   Referral Reason: Specialty Services Required   Requested Specialty: Psychology   Number of Visits Requested: 1     Ambulatory referral/consult to [facility]   Standing Status: Future   Referral Priority: Routine Referral Type: Consultation   Referral Reason: Specialty Services Required   Number of Visits Requested: 1     Ambulatory referral/consult to Neurosurgery   Standing Status: Future   Referral Priority: Urgent Referral Type: Consultation   Referral Reason: Specialty Services Required   Requested Specialty: Neurosurgery   Number of Visits Requested: 1     Ambulatory referral/consult to [facility]   Standing Status: Future   Referral Priority: Routine Referral Type: Consultation   Referral Reason: Specialty Services Required   Number of Visits Requested: 1     Diet diabetic     Diet Cardiac     Notify your health care provider if you experience any of the following:  temperature >100.4     Notify your health care provider if you experience any of the following:  persistent nausea and vomiting or diarrhea     Notify your health care provider if you experience any of the following:  severe uncontrolled pain     Notify your health care provider if you experience any of the following:  redness, tenderness, or signs of infection (pain, swelling, redness, odor or green/yellow discharge around incision site)     Notify your health care provider if you experience any of the following:  difficulty breathing  or increased cough     Notify your health care provider if you experience any of the following:  severe persistent headache     Notify your health care provider if you experience any of the following:  worsening rash     Notify your health care provider if you experience any of the following:  persistent dizziness, light-headedness, or visual disturbances     Notify your health care provider if you experience any of the following:  increased confusion or weakness     Notify your health care provider if you experience any of the following:     Activity as tolerated     Ambulatory Request for Ready Responder Services   Standing Status: Future Standing Exp. Date: 05/05/24     Order Specific Question Answer Comments   Program referred to: COVID-19 Vaccine        Significant Diagnostic Studies:   Lab Results   Component Value Date    WBC 5.60 05/03/2023    HGB 12.7 05/03/2023    HCT 39.1 05/03/2023    MCV 93 05/03/2023     05/03/2023       CMP  Sodium   Date Value Ref Range Status   05/05/2023 139 136 - 145 mmol/L Final     Potassium   Date Value Ref Range Status   05/05/2023 3.4 (L) 3.5 - 5.1 mmol/L Final     Chloride   Date Value Ref Range Status   05/05/2023 103 95 - 110 mmol/L Final     CO2   Date Value Ref Range Status   05/05/2023 28 23 - 29 mmol/L Final     Glucose   Date Value Ref Range Status   05/05/2023 119 (H) 70 - 110 mg/dL Final     BUN   Date Value Ref Range Status   05/05/2023 24 (H) 8 - 23 mg/dL Final     Creatinine   Date Value Ref Range Status   05/05/2023 1.2 0.5 - 1.4 mg/dL Final     Calcium   Date Value Ref Range Status   05/05/2023 9.6 8.7 - 10.5 mg/dL Final     Total Protein   Date Value Ref Range Status   05/03/2023 6.5 6.0 - 8.4 g/dL Final     Albumin   Date Value Ref Range Status   05/03/2023 3.7 3.5 - 5.2 g/dL Final     Total Bilirubin   Date Value Ref Range Status   05/03/2023 1.1 (H) 0.1 - 1.0 mg/dL Final     Comment:     For infants and newborns, interpretation of results should be  based  on gestational age, weight and in agreement with clinical  observations.    Premature Infant recommended reference ranges:  Up to 24 hours.............<8.0 mg/dL  Up to 48 hours............<12.0 mg/dL  3-5 days..................<15.0 mg/dL  6-29 days.................<15.0 mg/dL       Alkaline Phosphatase   Date Value Ref Range Status   05/03/2023 78 55 - 135 U/L Final     AST   Date Value Ref Range Status   05/03/2023 24 10 - 40 U/L Final     ALT   Date Value Ref Range Status   05/03/2023 24 10 - 44 U/L Final     Anion Gap   Date Value Ref Range Status   05/05/2023 8 8 - 16 mmol/L Final     eGFR   Date Value Ref Range Status   05/05/2023 44.9 (A) >60 mL/min/1.73 m^2 Final     Imaging Results          CTA STROKE MULTI-PHASE (Final result)  Result time 05/03/23 13:54:58    Final result by Castro Killian MD (05/03/23 13:54:58)                 Impression:      CT head shows no acute intracranial hemorrhage or CT evidence of acute major vascular territory infarct.    CTA shows no high-grade stenosis or large vessel occlusion.    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      Electronically signed by: Castro Killian MD  Date:    05/03/2023  Time:    13:54             Narrative:    EXAMINATION:  CTA STROKE MULTI-PHASE    CLINICAL HISTORY:  Neuro deficit, acute, stroke suspected;    TECHNIQUE:  Non contrast low dose axial images were obtained thought the head. CT angiogram was performed from the level of the fior to the top of the head following the IV administration of 100mL of Omnipaque 350.   Sagittal and coronal reconstructions and maximum intensity projection reconstructions were performed. Arterial stenosis percentages are based on NASCET measurement criteria.  Two additional phases of immediate post-contrast CTA images were performed through the head alone.    COMPARISON:  None    FINDINGS:  CT head: No midline shift, hydrocephalus or mass effect.  No acute intracranial hemorrhage or CT  evidence of acute major vascular territory infarct.  No abnormal extra-axial fluid collections.    CTA head and neck:    Moderate atherosclerosis of the aortic arch.  Marked tortuosity of the aortic branch vessels which are patent.  There is significant artifact from left chest wall AICD.  Small venous collaterals in the left chest wall and paraspinal regions.    Bilateral common carotid arteries are patent.  There is mild atherosclerosis at the carotid bifurcations without significant stenosis.  Medial deviation of the left carotid artery.  Cervical internal carotid arteries are tortuous bilaterally but patent.  Petrous, cavernous and supraclinoid segments of the internal carotid arteries are patent.  Small (3 mm) wide neck outpouching at the left MCA bifurcation likely an incidental aneurysm.  The major anterior and middle cerebral artery branches are patent.    Vertebral arteries are patent bilaterally.  Basilar and major posterior cerebral artery branches are patent.    The major dural venous sinuses are patent.    Nonvascular structures: Orbits show no significant abnormalities.  Parotid, submandibular and thyroid gland shows no significant abnormalities.  Heart is enlarged.  Partially visualized AICD.  There are coronary artery calcifications.  Visualized portions of the lung apices show no significant abnormalities.    Osseous structures show no acute abnormalities.                              Results for orders placed or performed during the hospital encounter of 05/03/23 (from the past 2160 hour(s))   MRI Brain Without Contrast    Impression    No acute intracranial abnormalities, specifically no acute infarction.      Electronically signed by: Castro Killian MD  Date:    05/04/2023  Time:    10:43       Pending Diagnostic Studies:     None         Medications:  Reconciled Home Medications:      Medication List      CONTINUE taking these medications    acetaminophen 500 MG tablet  Commonly known as:  TYLENOL  Take 1 tablet (500 mg total) by mouth every 6 (six) hours as needed for Pain (and fever).     albuterol 90 mcg/actuation inhaler  Commonly known as: PROVENTIL/VENTOLIN HFA  Inhale 2 puffs into the lungs.     APPLE CIDER VINEGAR ORAL  Take 450 mg by mouth once daily.     atorvastatin 40 MG tablet  Commonly known as: LIPITOR  Take 40 mg by mouth.     busPIRone 5 MG Tab  Commonly known as: BUSPAR  Take 5 mg by mouth 2 (two) times daily.     carvediloL 3.125 MG tablet  Commonly known as: COREG  Take 1 tablet (3.125 mg total) by mouth 2 (two) times daily with meals.     cholecalciferol (vitamin D3) 25 mcg (1,000 unit) capsule  Commonly known as: VITAMIN D3  Take 1,000 Units by mouth once daily.     cycloSPORINE 0.05 % ophthalmic emulsion  Commonly known as: RESTASIS  Apply 1 drop to eye every 12 (twelve) hours.     dicyclomine 10 MG capsule  Commonly known as: BENTYL  Take 10 mg by mouth 4 (four) times daily before meals and nightly.     econazole nitrate 1 % cream  Apply topically 2 (two) times daily.     ELIQUIS 2.5 mg Tab  Generic drug: apixaban  Take 2.5 mg by mouth 2 (two) times daily.     ergocalciferol 50,000 unit Cap  Commonly known as: ERGOCALCIFEROL  Take 50,000 Units by mouth every 30 days.     fish oil-omega-3 fatty acids 300-1,000 mg capsule  Take 2 g by mouth once daily.     furosemide 40 MG tablet  Commonly known as: LASIX  Take 40 mg by mouth 2 (two) times daily.     JARDIANCE 25 mg tablet  Generic drug: empagliflozin  Take 25 mg by mouth once daily.     levalbuterol 1.25 mg/3 mL nebulizer solution  Commonly known as: XOPENEX  Take 3 mLs (1.25 mg total) by nebulization every 6 (six) hours as needed for Wheezing. Rescue     losartan 100 MG tablet  Commonly known as: COZAAR  Take 100 mg by mouth once daily.     multivitamin with minerals tablet  Take by mouth.     mv-min-folic acid-lutein 500-250 mcg Chew  Take 1 tablet by mouth.     nitroGLYCERIN 0.4 MG SL tablet  Commonly known as:  NITROSTAT  Place 1 tablet (0.4 mg total) under the tongue every 5 (five) minutes as needed for Chest pain.     pantoprazole 20 MG tablet  Commonly known as: PROTONIX  Take 20 mg by mouth every morning.     TRADJENTA 5 mg Tab tablet  Generic drug: linaGLIPtin  TAKE ONE(1) TABLET BY MOUTH EVERY DAY        STOP taking these medications    denosumab 60 mg/mL Syrg  Commonly known as: PROLIA     fluticasone propionate 50 mcg/actuation nasal spray  Commonly known as: FLONASE     predniSONE 20 MG tablet  Commonly known as: DELTASONE            Indwelling Lines/Drains at time of discharge:   Lines/Drains/Airways     None                 Time spent on the discharge of patient: 36 minutes         Brittney Segura PA-C  Department of Hospital Medicine  [facility] - Observation 11H

## 2023-05-05 NOTE — PLAN OF CARE
[facility] is unable to accept pt.  Referral s   05/05/23 2774   Post-Acute Status   Post-Acute Authorization Home Health   Home Health Status Referrals Sent   Discharge Plan   Discharge Plan A Home Health     Sent to [facility].    [phone]) [facility] has declined patient stating they have a staff shortage.    [facility] has accepted the pt, but will not be able to see her until Tuesday.

## 2023-05-05 NOTE — PLAN OF CARE
Assessment completed.  Pt lives alone, is independent, uses a walkier for mobility, is not on dialysis and does not use home oxygen.    Pt's PCP is dr. Marianne Padilla whom she sawe last month.  T's preferred Rx is [facility].  Lc is on eloquis.  Pt's son Ken ([phone]) will transport her home at diascharge.    DC Plan is Home.  Home Health if needed.

## 2023-05-05 NOTE — NURSING
Discharged home. IV removed with catheter intact, discharge instructions reviewed with and provided to patient, verbalized understanding. Telemetry box TTX 1343 removed and returned. Left unit via wheelchair with patient transport, no signs of distress noted.

## 2023-05-11 ENCOUNTER — PES CALL (OUTPATIENT)
Dept: ADMINISTRATIVE | Facility: CLINIC | Age: 83
End: 2023-05-11
Payer: MEDICARE

## 2023-07-03 PROBLEM — N17.9 AKI (ACUTE KIDNEY INJURY): Status: RESOLVED | Noted: 2023-03-28 | Resolved: 2023-07-03
